# Patient Record
Sex: FEMALE | Race: WHITE | NOT HISPANIC OR LATINO
[De-identification: names, ages, dates, MRNs, and addresses within clinical notes are randomized per-mention and may not be internally consistent; named-entity substitution may affect disease eponyms.]

---

## 2019-06-13 PROBLEM — Z00.00 ENCOUNTER FOR PREVENTIVE HEALTH EXAMINATION: Status: ACTIVE | Noted: 2019-06-13

## 2019-06-14 ENCOUNTER — RX CHANGE (OUTPATIENT)
Age: 59
End: 2019-06-14

## 2019-07-25 ENCOUNTER — OUTPATIENT (OUTPATIENT)
Dept: OUTPATIENT SERVICES | Facility: HOSPITAL | Age: 59
LOS: 1 days | Discharge: HOME | End: 2019-07-25

## 2019-07-25 ENCOUNTER — LABORATORY RESULT (OUTPATIENT)
Age: 59
End: 2019-07-25

## 2019-07-25 DIAGNOSIS — R10.13 EPIGASTRIC PAIN: ICD-10-CM

## 2019-07-25 DIAGNOSIS — K22.70 BARRETT'S ESOPHAGUS WITHOUT DYSPLASIA: ICD-10-CM

## 2019-07-29 ENCOUNTER — TRANSCRIPTION ENCOUNTER (OUTPATIENT)
Age: 59
End: 2019-07-29

## 2019-07-29 ENCOUNTER — RESULT REVIEW (OUTPATIENT)
Age: 59
End: 2019-07-29

## 2019-07-29 ENCOUNTER — OUTPATIENT (OUTPATIENT)
Dept: OUTPATIENT SERVICES | Facility: HOSPITAL | Age: 59
LOS: 1 days | Discharge: HOME | End: 2019-07-29
Payer: COMMERCIAL

## 2019-07-29 ENCOUNTER — OTHER (OUTPATIENT)
Age: 59
End: 2019-07-29

## 2019-07-29 VITALS — SYSTOLIC BLOOD PRESSURE: 162 MMHG | RESPIRATION RATE: 18 BRPM | HEART RATE: 82 BPM | DIASTOLIC BLOOD PRESSURE: 87 MMHG

## 2019-07-29 VITALS
DIASTOLIC BLOOD PRESSURE: 83 MMHG | SYSTOLIC BLOOD PRESSURE: 149 MMHG | HEART RATE: 103 BPM | WEIGHT: 125 LBS | HEIGHT: 64 IN | OXYGEN SATURATION: 91 % | TEMPERATURE: 99 F | RESPIRATION RATE: 17 BRPM

## 2019-07-29 DIAGNOSIS — Z87.19 PERSONAL HISTORY OF OTHER DISEASES OF THE DIGESTIVE SYSTEM: ICD-10-CM

## 2019-07-29 DIAGNOSIS — Z98.890 OTHER SPECIFIED POSTPROCEDURAL STATES: Chronic | ICD-10-CM

## 2019-07-29 DIAGNOSIS — Z87.2 PERSONAL HISTORY OF DISEASES OF THE SKIN AND SUBCUTANEOUS TISSUE: ICD-10-CM

## 2019-07-29 DIAGNOSIS — F32.9 MAJOR DEPRESSIVE DISORDER, SINGLE EPISODE, UNSPECIFIED: Chronic | ICD-10-CM

## 2019-07-29 PROCEDURE — G0121 COLON CA SCRN NOT HI RSK IND: CPT

## 2019-07-29 PROCEDURE — 88312 SPECIAL STAINS GROUP 1: CPT | Mod: 26

## 2019-07-29 PROCEDURE — 88305 TISSUE EXAM BY PATHOLOGIST: CPT | Mod: 26

## 2019-07-29 PROCEDURE — 43239 EGD BIOPSY SINGLE/MULTIPLE: CPT | Mod: XS

## 2019-07-29 RX ORDER — SODIUM CHLORIDE 9 MG/ML
1000 INJECTION, SOLUTION INTRAVENOUS
Refills: 0 | Status: DISCONTINUED | OUTPATIENT
Start: 2019-07-29 | End: 2019-08-15

## 2019-07-29 RX ORDER — ONDANSETRON 8 MG/1
4 TABLET, FILM COATED ORAL ONCE
Refills: 0 | Status: DISCONTINUED | OUTPATIENT
Start: 2019-07-29 | End: 2019-08-15

## 2019-07-29 NOTE — H&P PST ADULT - NSICDXPASTMEDICALHX_GEN_ALL_CORE_FT
PAST MEDICAL HISTORY:  Depression, unspecified depression type     Eczema, unspecified type     Fibromyalgia

## 2019-07-29 NOTE — H&P PST ADULT - REASON FOR ADMISSION
57 yo f for egd and colonoscopy for anemia 57 yo f with history of Perez's esophagus  for egd and colonoscopy

## 2019-07-29 NOTE — H&P PST ADULT - HISTORY OF PRESENT ILLNESS
59 yo f for egd and colonoscopy for anemia 59 yo f with history of Perez's esophagitis  for egd and colonoscop

## 2019-07-29 NOTE — H&P PST ADULT - ASSESSMENT
59 yo f for egd and colonoscopy for anemia 57 yo f with a history of Perez's esophagitis for egd and colonoscopy

## 2019-07-29 NOTE — ASU DISCHARGE PLAN (ADULT/PEDIATRIC) - CALL YOUR DOCTOR IF YOU HAVE ANY OF THE FOLLOWING:
Unable to urinate/Bleeding that does not stop/Fever greater than (need to indicate Fahrenheit or Celsius)

## 2019-07-29 NOTE — ASU DISCHARGE PLAN (ADULT/PEDIATRIC) - CARE PROVIDER_API CALL
Octavio Lewis)  Gastroenterology; Internal Medicine  [address] [place] [address]  Phone: [phone]  Fax: [phone]  Follow Up Time: 1 month

## 2019-07-30 LAB — SURGICAL PATHOLOGY STUDY: SIGNIFICANT CHANGE UP

## 2019-08-01 DIAGNOSIS — F32.9 MAJOR DEPRESSIVE DISORDER, SINGLE EPISODE, UNSPECIFIED: ICD-10-CM

## 2019-08-01 DIAGNOSIS — K64.4 RESIDUAL HEMORRHOIDAL SKIN TAGS: ICD-10-CM

## 2019-08-01 DIAGNOSIS — Z12.11 ENCOUNTER FOR SCREENING FOR MALIGNANT NEOPLASM OF COLON: ICD-10-CM

## 2019-08-01 DIAGNOSIS — K29.50 UNSPECIFIED CHRONIC GASTRITIS WITHOUT BLEEDING: ICD-10-CM

## 2019-08-01 DIAGNOSIS — K64.1 SECOND DEGREE HEMORRHOIDS: ICD-10-CM

## 2019-08-01 DIAGNOSIS — M79.7 FIBROMYALGIA: ICD-10-CM

## 2019-08-09 PROBLEM — F32.9 MAJOR DEPRESSIVE DISORDER, SINGLE EPISODE, UNSPECIFIED: Chronic | Status: ACTIVE | Noted: 2019-07-29

## 2019-08-09 PROBLEM — M79.7 FIBROMYALGIA: Chronic | Status: ACTIVE | Noted: 2019-07-29

## 2019-08-09 PROBLEM — L30.9 DERMATITIS, UNSPECIFIED: Chronic | Status: ACTIVE | Noted: 2019-07-29

## 2019-10-29 ENCOUNTER — APPOINTMENT (OUTPATIENT)
Dept: GASTROENTEROLOGY | Facility: CLINIC | Age: 59
End: 2019-10-29

## 2020-06-09 ENCOUNTER — APPOINTMENT (OUTPATIENT)
Dept: GASTROENTEROLOGY | Facility: CLINIC | Age: 60
End: 2020-06-09
Payer: COMMERCIAL

## 2020-06-09 DIAGNOSIS — Z86.39 PERSONAL HISTORY OF OTHER ENDOCRINE, NUTRITIONAL AND METABOLIC DISEASE: ICD-10-CM

## 2020-06-09 DIAGNOSIS — Z87.39 PERSONAL HISTORY OF OTHER DISEASES OF THE MUSCULOSKELETAL SYSTEM AND CONNECTIVE TISSUE: ICD-10-CM

## 2020-06-09 DIAGNOSIS — Z12.11 ENCOUNTER FOR SCREENING FOR MALIGNANT NEOPLASM OF COLON: ICD-10-CM

## 2020-06-09 DIAGNOSIS — Z12.12 ENCOUNTER FOR SCREENING FOR MALIGNANT NEOPLASM OF COLON: ICD-10-CM

## 2020-06-09 PROCEDURE — 99213 OFFICE O/P EST LOW 20 MIN: CPT | Mod: 95

## 2020-06-09 RX ORDER — TIZANIDINE HYDROCHLORIDE 4 MG/1
TABLET ORAL
Refills: 0 | Status: ACTIVE | COMMUNITY

## 2020-06-09 RX ORDER — LEVOTHYROXINE SODIUM 50 UG/1
50 TABLET ORAL
Refills: 0 | Status: ACTIVE | COMMUNITY

## 2020-06-09 RX ORDER — ESCITALOPRAM OXALATE 10 MG/1
10 TABLET, FILM COATED ORAL
Refills: 0 | Status: ACTIVE | COMMUNITY

## 2020-06-09 RX ORDER — AMITRIPTYLINE HYDROCHLORIDE 25 MG/1
25 TABLET ORAL
Refills: 0 | Status: ACTIVE | COMMUNITY

## 2020-06-09 NOTE — HISTORY OF PRESENT ILLNESS
[Home] : at home, [unfilled] , at the time of the visit. [Medical Office: ([facility])___] : at the medical office located in  [Verbal consent obtained from patient] : the patient, [unfilled] [FreeTextEntry4] : Annette Pérez [de-identified] : This is a 59 year  old female who presents with Perez's esophagitis, followup of egd and colonoscopy.\par She is compliant with protonix 40 mg po bid.\par The patient denies nausea, vomiting , dysphagia, odynophagia, post prandial abdominal pain, or melena. The patient denies abdominal cramping, or black or bloody stool or wt loss.

## 2020-07-01 ENCOUNTER — TRANSCRIPTION ENCOUNTER (OUTPATIENT)
Age: 60
End: 2020-07-01

## 2020-12-08 ENCOUNTER — APPOINTMENT (OUTPATIENT)
Dept: GASTROENTEROLOGY | Facility: CLINIC | Age: 60
End: 2020-12-08

## 2021-06-09 ENCOUNTER — RX RENEWAL (OUTPATIENT)
Age: 61
End: 2021-06-09

## 2021-07-13 NOTE — PRE-ANESTHESIA EVALUATION ADULT - HEIGHT IN FEET
6
DM2 (diabetes mellitus, type 2)    GERD (gastroesophageal reflux disease)    Hyperlipemia    Hypertension    Hypothyroid    Lumbar spinal stenosis    Morbid obesity  BMI 50.6

## 2022-06-03 ENCOUNTER — RX RENEWAL (OUTPATIENT)
Age: 62
End: 2022-06-03

## 2023-05-29 ENCOUNTER — RX RENEWAL (OUTPATIENT)
Age: 63
End: 2023-05-29

## 2023-09-19 RX ADMIN — ETOMIDATE 15 MILLIGRAM(S): 2 INJECTION INTRAVENOUS at 22:40

## 2023-12-19 ENCOUNTER — INPATIENT (INPATIENT)
Facility: HOSPITAL | Age: 63
LOS: 9 days | Discharge: HOME | DRG: 871 | End: 2023-12-29
Attending: INTERNAL MEDICINE | Admitting: INTERNAL MEDICINE
Payer: COMMERCIAL

## 2023-12-19 VITALS
HEART RATE: 102 BPM | RESPIRATION RATE: 18 BRPM | DIASTOLIC BLOOD PRESSURE: 98 MMHG | OXYGEN SATURATION: 99 % | SYSTOLIC BLOOD PRESSURE: 208 MMHG

## 2023-12-19 DIAGNOSIS — A41.9 SEPSIS, UNSPECIFIED ORGANISM: ICD-10-CM

## 2023-12-19 DIAGNOSIS — Z98.890 OTHER SPECIFIED POSTPROCEDURAL STATES: Chronic | ICD-10-CM

## 2023-12-19 LAB
ALBUMIN SERPL ELPH-MCNC: 4 G/DL — SIGNIFICANT CHANGE UP (ref 3.5–5.2)
ALBUMIN SERPL ELPH-MCNC: 4 G/DL — SIGNIFICANT CHANGE UP (ref 3.5–5.2)
ALP SERPL-CCNC: 137 U/L — HIGH (ref 30–115)
ALP SERPL-CCNC: 137 U/L — HIGH (ref 30–115)
ALT FLD-CCNC: 75 U/L — HIGH (ref 0–41)
ALT FLD-CCNC: 75 U/L — HIGH (ref 0–41)
ANION GAP SERPL CALC-SCNC: 12 MMOL/L — SIGNIFICANT CHANGE UP (ref 7–14)
ANION GAP SERPL CALC-SCNC: 12 MMOL/L — SIGNIFICANT CHANGE UP (ref 7–14)
APTT BLD: 25.6 SEC — LOW (ref 27–39.2)
APTT BLD: 25.6 SEC — LOW (ref 27–39.2)
AST SERPL-CCNC: 34 U/L — SIGNIFICANT CHANGE UP (ref 0–41)
AST SERPL-CCNC: 34 U/L — SIGNIFICANT CHANGE UP (ref 0–41)
BASE EXCESS BLDV CALC-SCNC: 4.7 MMOL/L — HIGH (ref -2–3)
BASE EXCESS BLDV CALC-SCNC: 4.7 MMOL/L — HIGH (ref -2–3)
BASOPHILS # BLD AUTO: 0.02 K/UL — SIGNIFICANT CHANGE UP (ref 0–0.2)
BASOPHILS # BLD AUTO: 0.02 K/UL — SIGNIFICANT CHANGE UP (ref 0–0.2)
BASOPHILS NFR BLD AUTO: 0.2 % — SIGNIFICANT CHANGE UP (ref 0–1)
BASOPHILS NFR BLD AUTO: 0.2 % — SIGNIFICANT CHANGE UP (ref 0–1)
BILIRUB SERPL-MCNC: 0.2 MG/DL — SIGNIFICANT CHANGE UP (ref 0.2–1.2)
BILIRUB SERPL-MCNC: 0.2 MG/DL — SIGNIFICANT CHANGE UP (ref 0.2–1.2)
BUN SERPL-MCNC: 21 MG/DL — HIGH (ref 10–20)
BUN SERPL-MCNC: 21 MG/DL — HIGH (ref 10–20)
CALCIUM SERPL-MCNC: 8.9 MG/DL — SIGNIFICANT CHANGE UP (ref 8.4–10.5)
CALCIUM SERPL-MCNC: 8.9 MG/DL — SIGNIFICANT CHANGE UP (ref 8.4–10.5)
CHLORIDE SERPL-SCNC: 94 MMOL/L — LOW (ref 98–110)
CHLORIDE SERPL-SCNC: 94 MMOL/L — LOW (ref 98–110)
CO2 SERPL-SCNC: 27 MMOL/L — SIGNIFICANT CHANGE UP (ref 17–32)
CO2 SERPL-SCNC: 27 MMOL/L — SIGNIFICANT CHANGE UP (ref 17–32)
CREAT SERPL-MCNC: 1 MG/DL — SIGNIFICANT CHANGE UP (ref 0.7–1.5)
CREAT SERPL-MCNC: 1 MG/DL — SIGNIFICANT CHANGE UP (ref 0.7–1.5)
EGFR: 64 ML/MIN/1.73M2 — SIGNIFICANT CHANGE UP
EGFR: 64 ML/MIN/1.73M2 — SIGNIFICANT CHANGE UP
EOSINOPHIL # BLD AUTO: 0.03 K/UL — SIGNIFICANT CHANGE UP (ref 0–0.7)
EOSINOPHIL # BLD AUTO: 0.03 K/UL — SIGNIFICANT CHANGE UP (ref 0–0.7)
EOSINOPHIL NFR BLD AUTO: 0.3 % — SIGNIFICANT CHANGE UP (ref 0–8)
EOSINOPHIL NFR BLD AUTO: 0.3 % — SIGNIFICANT CHANGE UP (ref 0–8)
GAS PNL BLDV: 129 MMOL/L — LOW (ref 136–145)
GAS PNL BLDV: 129 MMOL/L — LOW (ref 136–145)
GAS PNL BLDV: SIGNIFICANT CHANGE UP
GAS PNL BLDV: SIGNIFICANT CHANGE UP
GLUCOSE SERPL-MCNC: 141 MG/DL — HIGH (ref 70–99)
GLUCOSE SERPL-MCNC: 141 MG/DL — HIGH (ref 70–99)
HCO3 BLDV-SCNC: 32 MMOL/L — HIGH (ref 22–29)
HCO3 BLDV-SCNC: 32 MMOL/L — HIGH (ref 22–29)
HCT VFR BLD CALC: 40.1 % — SIGNIFICANT CHANGE UP (ref 37–47)
HCT VFR BLD CALC: 40.1 % — SIGNIFICANT CHANGE UP (ref 37–47)
HGB BLD-MCNC: 12.9 G/DL — SIGNIFICANT CHANGE UP (ref 12–16)
HGB BLD-MCNC: 12.9 G/DL — SIGNIFICANT CHANGE UP (ref 12–16)
IMM GRANULOCYTES NFR BLD AUTO: 0.3 % — SIGNIFICANT CHANGE UP (ref 0.1–0.3)
IMM GRANULOCYTES NFR BLD AUTO: 0.3 % — SIGNIFICANT CHANGE UP (ref 0.1–0.3)
INR BLD: 0.97 RATIO — SIGNIFICANT CHANGE UP (ref 0.65–1.3)
INR BLD: 0.97 RATIO — SIGNIFICANT CHANGE UP (ref 0.65–1.3)
LACTATE BLDV-MCNC: 2.3 MMOL/L — HIGH (ref 0.5–2)
LACTATE BLDV-MCNC: 2.3 MMOL/L — HIGH (ref 0.5–2)
LYMPHOCYTES # BLD AUTO: 0.81 K/UL — LOW (ref 1.2–3.4)
LYMPHOCYTES # BLD AUTO: 0.81 K/UL — LOW (ref 1.2–3.4)
LYMPHOCYTES # BLD AUTO: 8.9 % — LOW (ref 20.5–51.1)
LYMPHOCYTES # BLD AUTO: 8.9 % — LOW (ref 20.5–51.1)
MCHC RBC-ENTMCNC: 26.5 PG — LOW (ref 27–31)
MCHC RBC-ENTMCNC: 26.5 PG — LOW (ref 27–31)
MCHC RBC-ENTMCNC: 32.2 G/DL — SIGNIFICANT CHANGE UP (ref 32–37)
MCHC RBC-ENTMCNC: 32.2 G/DL — SIGNIFICANT CHANGE UP (ref 32–37)
MCV RBC AUTO: 82.3 FL — SIGNIFICANT CHANGE UP (ref 81–99)
MCV RBC AUTO: 82.3 FL — SIGNIFICANT CHANGE UP (ref 81–99)
MONOCYTES # BLD AUTO: 0.59 K/UL — SIGNIFICANT CHANGE UP (ref 0.1–0.6)
MONOCYTES # BLD AUTO: 0.59 K/UL — SIGNIFICANT CHANGE UP (ref 0.1–0.6)
MONOCYTES NFR BLD AUTO: 6.5 % — SIGNIFICANT CHANGE UP (ref 1.7–9.3)
MONOCYTES NFR BLD AUTO: 6.5 % — SIGNIFICANT CHANGE UP (ref 1.7–9.3)
NEUTROPHILS # BLD AUTO: 7.63 K/UL — HIGH (ref 1.4–6.5)
NEUTROPHILS # BLD AUTO: 7.63 K/UL — HIGH (ref 1.4–6.5)
NEUTROPHILS NFR BLD AUTO: 83.8 % — HIGH (ref 42.2–75.2)
NEUTROPHILS NFR BLD AUTO: 83.8 % — HIGH (ref 42.2–75.2)
NRBC # BLD: 0 /100 WBCS — SIGNIFICANT CHANGE UP (ref 0–0)
NRBC # BLD: 0 /100 WBCS — SIGNIFICANT CHANGE UP (ref 0–0)
PCO2 BLDV: 58 MMHG — HIGH (ref 39–42)
PCO2 BLDV: 58 MMHG — HIGH (ref 39–42)
PH BLDV: 7.35 — SIGNIFICANT CHANGE UP (ref 7.32–7.43)
PH BLDV: 7.35 — SIGNIFICANT CHANGE UP (ref 7.32–7.43)
PLATELET # BLD AUTO: 200 K/UL — SIGNIFICANT CHANGE UP (ref 130–400)
PLATELET # BLD AUTO: 200 K/UL — SIGNIFICANT CHANGE UP (ref 130–400)
PMV BLD: 10.8 FL — HIGH (ref 7.4–10.4)
PMV BLD: 10.8 FL — HIGH (ref 7.4–10.4)
PO2 BLDV: 42 MMHG — SIGNIFICANT CHANGE UP (ref 25–45)
PO2 BLDV: 42 MMHG — SIGNIFICANT CHANGE UP (ref 25–45)
POTASSIUM BLDV-SCNC: 4.8 MMOL/L — SIGNIFICANT CHANGE UP (ref 3.5–5.1)
POTASSIUM BLDV-SCNC: 4.8 MMOL/L — SIGNIFICANT CHANGE UP (ref 3.5–5.1)
POTASSIUM SERPL-MCNC: 4.9 MMOL/L — SIGNIFICANT CHANGE UP (ref 3.5–5)
POTASSIUM SERPL-MCNC: 4.9 MMOL/L — SIGNIFICANT CHANGE UP (ref 3.5–5)
POTASSIUM SERPL-SCNC: 4.9 MMOL/L — SIGNIFICANT CHANGE UP (ref 3.5–5)
POTASSIUM SERPL-SCNC: 4.9 MMOL/L — SIGNIFICANT CHANGE UP (ref 3.5–5)
PROT SERPL-MCNC: 7.5 G/DL — SIGNIFICANT CHANGE UP (ref 6–8)
PROT SERPL-MCNC: 7.5 G/DL — SIGNIFICANT CHANGE UP (ref 6–8)
PROTHROM AB SERPL-ACNC: 11.1 SEC — SIGNIFICANT CHANGE UP (ref 9.95–12.87)
PROTHROM AB SERPL-ACNC: 11.1 SEC — SIGNIFICANT CHANGE UP (ref 9.95–12.87)
RBC # BLD: 4.87 M/UL — SIGNIFICANT CHANGE UP (ref 4.2–5.4)
RBC # BLD: 4.87 M/UL — SIGNIFICANT CHANGE UP (ref 4.2–5.4)
RBC # FLD: 14.3 % — SIGNIFICANT CHANGE UP (ref 11.5–14.5)
RBC # FLD: 14.3 % — SIGNIFICANT CHANGE UP (ref 11.5–14.5)
SAO2 % BLDV: 69.1 % — SIGNIFICANT CHANGE UP (ref 67–88)
SAO2 % BLDV: 69.1 % — SIGNIFICANT CHANGE UP (ref 67–88)
SODIUM SERPL-SCNC: 133 MMOL/L — LOW (ref 135–146)
SODIUM SERPL-SCNC: 133 MMOL/L — LOW (ref 135–146)
TROPONIN T, HIGH SENSITIVITY RESULT: 9 NG/L — SIGNIFICANT CHANGE UP (ref 6–13)
TROPONIN T, HIGH SENSITIVITY RESULT: 9 NG/L — SIGNIFICANT CHANGE UP (ref 6–13)
WBC # BLD: 9.11 K/UL — SIGNIFICANT CHANGE UP (ref 4.8–10.8)
WBC # BLD: 9.11 K/UL — SIGNIFICANT CHANGE UP (ref 4.8–10.8)
WBC # FLD AUTO: 9.11 K/UL — SIGNIFICANT CHANGE UP (ref 4.8–10.8)
WBC # FLD AUTO: 9.11 K/UL — SIGNIFICANT CHANGE UP (ref 4.8–10.8)

## 2023-12-19 PROCEDURE — 80354 DRUG SCREENING FENTANYL: CPT

## 2023-12-19 PROCEDURE — 71260 CT THORAX DX C+: CPT | Mod: 26,MA

## 2023-12-19 PROCEDURE — C1887: CPT

## 2023-12-19 PROCEDURE — C1760: CPT

## 2023-12-19 PROCEDURE — 95819 EEG AWAKE AND ASLEEP: CPT

## 2023-12-19 PROCEDURE — 36226 PLACE CATH VERTEBRAL ART: CPT | Mod: LT

## 2023-12-19 PROCEDURE — 93970 EXTREMITY STUDY: CPT

## 2023-12-19 PROCEDURE — 86901 BLOOD TYPING SEROLOGIC RH(D): CPT

## 2023-12-19 PROCEDURE — 94002 VENT MGMT INPAT INIT DAY: CPT

## 2023-12-19 PROCEDURE — 85730 THROMBOPLASTIN TIME PARTIAL: CPT

## 2023-12-19 PROCEDURE — 84443 ASSAY THYROID STIM HORMONE: CPT

## 2023-12-19 PROCEDURE — 80048 BASIC METABOLIC PNL TOTAL CA: CPT

## 2023-12-19 PROCEDURE — 87640 STAPH A DNA AMP PROBE: CPT

## 2023-12-19 PROCEDURE — 97162 PT EVAL MOD COMPLEX 30 MIN: CPT | Mod: GP

## 2023-12-19 PROCEDURE — 71045 X-RAY EXAM CHEST 1 VIEW: CPT

## 2023-12-19 PROCEDURE — 85610 PROTHROMBIN TIME: CPT

## 2023-12-19 PROCEDURE — 84145 PROCALCITONIN (PCT): CPT

## 2023-12-19 PROCEDURE — 85379 FIBRIN DEGRADATION QUANT: CPT

## 2023-12-19 PROCEDURE — 70551 MRI BRAIN STEM W/O DYE: CPT

## 2023-12-19 PROCEDURE — 86850 RBC ANTIBODY SCREEN: CPT

## 2023-12-19 PROCEDURE — 36224 PLACE CATH CAROTD ART: CPT | Mod: 50

## 2023-12-19 PROCEDURE — 85025 COMPLETE CBC W/AUTO DIFF WBC: CPT

## 2023-12-19 PROCEDURE — 97116 GAIT TRAINING THERAPY: CPT | Mod: GP

## 2023-12-19 PROCEDURE — 81001 URINALYSIS AUTO W/SCOPE: CPT

## 2023-12-19 PROCEDURE — 76937 US GUIDE VASCULAR ACCESS: CPT

## 2023-12-19 PROCEDURE — C1894: CPT

## 2023-12-19 PROCEDURE — 87899 AGENT NOS ASSAY W/OPTIC: CPT

## 2023-12-19 PROCEDURE — 97535 SELF CARE MNGMENT TRAINING: CPT | Mod: GO

## 2023-12-19 PROCEDURE — 80361 OPIATES 1 OR MORE: CPT

## 2023-12-19 PROCEDURE — 85014 HEMATOCRIT: CPT

## 2023-12-19 PROCEDURE — 83735 ASSAY OF MAGNESIUM: CPT

## 2023-12-19 PROCEDURE — 36415 COLL VENOUS BLD VENIPUNCTURE: CPT

## 2023-12-19 PROCEDURE — 87070 CULTURE OTHR SPECIMN AEROBIC: CPT

## 2023-12-19 PROCEDURE — 87641 MR-STAPH DNA AMP PROBE: CPT

## 2023-12-19 PROCEDURE — 36227 PLACE CATH XTRNL CAROTID: CPT | Mod: 50

## 2023-12-19 PROCEDURE — 85027 COMPLETE CBC AUTOMATED: CPT

## 2023-12-19 PROCEDURE — 97530 THERAPEUTIC ACTIVITIES: CPT | Mod: GP

## 2023-12-19 PROCEDURE — 31500 INSERT EMERGENCY AIRWAY: CPT

## 2023-12-19 PROCEDURE — 70450 CT HEAD/BRAIN W/O DYE: CPT | Mod: 26,MA

## 2023-12-19 PROCEDURE — 83036 HEMOGLOBIN GLYCOSYLATED A1C: CPT

## 2023-12-19 PROCEDURE — 70496 CT ANGIOGRAPHY HEAD: CPT | Mod: 26,MA

## 2023-12-19 PROCEDURE — 85018 HEMOGLOBIN: CPT

## 2023-12-19 PROCEDURE — 99285 EMERGENCY DEPT VISIT HI MDM: CPT | Mod: 25

## 2023-12-19 PROCEDURE — C1769: CPT

## 2023-12-19 PROCEDURE — 84295 ASSAY OF SERUM SODIUM: CPT

## 2023-12-19 PROCEDURE — 0241U: CPT

## 2023-12-19 PROCEDURE — 94640 AIRWAY INHALATION TREATMENT: CPT

## 2023-12-19 PROCEDURE — 83605 ASSAY OF LACTIC ACID: CPT

## 2023-12-19 PROCEDURE — 80307 DRUG TEST PRSMV CHEM ANLYZR: CPT

## 2023-12-19 PROCEDURE — 71045 X-RAY EXAM CHEST 1 VIEW: CPT | Mod: 26

## 2023-12-19 PROCEDURE — 82803 BLOOD GASES ANY COMBINATION: CPT

## 2023-12-19 PROCEDURE — 80053 COMPREHEN METABOLIC PANEL: CPT

## 2023-12-19 PROCEDURE — 82330 ASSAY OF CALCIUM: CPT

## 2023-12-19 PROCEDURE — 0042T: CPT | Mod: MA

## 2023-12-19 PROCEDURE — 82140 ASSAY OF AMMONIA: CPT

## 2023-12-19 PROCEDURE — 84100 ASSAY OF PHOSPHORUS: CPT

## 2023-12-19 PROCEDURE — 93307 TTE W/O DOPPLER COMPLETE: CPT

## 2023-12-19 PROCEDURE — 93005 ELECTROCARDIOGRAM TRACING: CPT

## 2023-12-19 PROCEDURE — 87449 NOS EACH ORGANISM AG IA: CPT

## 2023-12-19 PROCEDURE — 70498 CT ANGIOGRAPHY NECK: CPT | Mod: 26,MA

## 2023-12-19 PROCEDURE — 84132 ASSAY OF SERUM POTASSIUM: CPT

## 2023-12-19 PROCEDURE — 87086 URINE CULTURE/COLONY COUNT: CPT

## 2023-12-19 PROCEDURE — 97165 OT EVAL LOW COMPLEX 30 MIN: CPT | Mod: GO

## 2023-12-19 PROCEDURE — 86900 BLOOD TYPING SEROLOGIC ABO: CPT

## 2023-12-19 PROCEDURE — 86803 HEPATITIS C AB TEST: CPT

## 2023-12-19 RX ORDER — PROPOFOL 10 MG/ML
20 INJECTION, EMULSION INTRAVENOUS
Qty: 1000 | Refills: 0 | Status: DISCONTINUED | OUTPATIENT
Start: 2023-12-19 | End: 2023-12-20

## 2023-12-19 RX ORDER — SODIUM CHLORIDE 9 MG/ML
2000 INJECTION, SOLUTION INTRAVENOUS ONCE
Refills: 0 | Status: COMPLETED | OUTPATIENT
Start: 2023-12-19 | End: 2023-12-19

## 2023-12-19 RX ORDER — ETOMIDATE 2 MG/ML
15 INJECTION INTRAVENOUS ONCE
Refills: 0 | Status: COMPLETED | OUTPATIENT
Start: 2023-12-19 | End: 2023-09-19

## 2023-12-19 RX ORDER — ROCURONIUM BROMIDE 10 MG/ML
100 VIAL (ML) INTRAVENOUS ONCE
Refills: 0 | Status: COMPLETED | OUTPATIENT
Start: 2023-12-19 | End: 2023-12-19

## 2023-12-19 RX ORDER — CEFTRIAXONE 500 MG/1
1000 INJECTION, POWDER, FOR SOLUTION INTRAMUSCULAR; INTRAVENOUS ONCE
Refills: 0 | Status: COMPLETED | OUTPATIENT
Start: 2023-12-19 | End: 2023-12-19

## 2023-12-19 RX ORDER — METRONIDAZOLE 500 MG
500 TABLET ORAL ONCE
Refills: 0 | Status: COMPLETED | OUTPATIENT
Start: 2023-12-19 | End: 2023-12-19

## 2023-12-19 RX ORDER — AZITHROMYCIN 500 MG/1
500 TABLET, FILM COATED ORAL ONCE
Refills: 0 | Status: COMPLETED | OUTPATIENT
Start: 2023-12-19 | End: 2023-12-19

## 2023-12-19 RX ADMIN — PROPOFOL 7.8 MICROGRAM(S)/KG/MIN: 10 INJECTION, EMULSION INTRAVENOUS at 23:30

## 2023-12-19 RX ADMIN — Medication 100 MILLIGRAM(S): at 22:40

## 2023-12-19 NOTE — ED PROCEDURE NOTE - CPROC ED TRACHE INTUB DETAIL1
anterior airway, small mouth/Patient was pre-oxygenated. An endotracheal tube (ETT) was placed through the vocal cords into the trachea.  ETT position was confirmed by auscultation of bilateral breath sounds to all lung fields. ETCO2 level was appropriate./Difficult/crash intubation (see additional details section)./During intubation, applied gentle pressure to the cricoid cartilage.

## 2023-12-19 NOTE — H&P ADULT - NSHPPHYSICALEXAM_GEN_ALL_CORE
T(C): --  HR: 102 (12-19-23 @ 21:52) (102 - 102)  BP: 208/98 (12-19-23 @ 21:52) (208/98 - 208/98)  RR: 18 (12-19-23 @ 21:52) (18 - 18)  SpO2: 99% (12-19-23 @ 21:52) (99% - 99%)    CONSTITUTIONAL: Intubated and sedated   RESP: Mechanical resp sounds, bilateral ronchi. No wheezing  CV: RRR, +S1S2, no MRG; no JVD; no peripheral edema  GI: Soft, NT, ND, no rebound, no guarding

## 2023-12-19 NOTE — ED PROVIDER NOTE - NSICDXPASTSURGICALHX_GEN_ALL_CORE_FT
PAST SURGICAL HISTORY:  History of surgery RIGHT HIP REPLACEMENT    History of surgery BACK SURGERY

## 2023-12-19 NOTE — ED ADULT TRIAGE NOTE - CHIEF COMPLAINT QUOTE
Patient bibems from home as per EMS pts son called pt overdosed on one of her medications unsure which one. Pt transferred to [facility] Patient bibems from home as per EMS pts son called pt overdosed on one of her medications unsure which one. Pt transferred to [facility]

## 2023-12-19 NOTE — STROKE CODE NOTE - PROVIDER ASSESSMENT_DATE AND TIME
______________________________________________________________________________   

  

[id number] RAD/RAD Chest PA And Lateral  

EXAM:  RAD Chest PA And Lateral  

   

 INDICATION:  CONGESTIVE HEART FAILURE  

   

 COMPARISON:  None.  

   

 DISCUSSION:    

   

 Cardiomediastinal silhouette is enlarged.  

   

 No infiltrate, effusion, pneumothorax, or edema.  

   

 IMPRESSION:  

 Megaly without evidence of congestive heart failure exacerbation.  

   

 Electronically signed by Atul Orourke MD on 7/28/2019 1:11 PM  

   

  

Atul Orourke DO                 

 07/28/19 1314    

  

Thank you for allowing us to participate in the care of your patient. 19-Dec-2023 22:02

## 2023-12-19 NOTE — ED PROVIDER NOTE - CLINICAL SUMMARY MEDICAL DECISION MAKING FREE TEXT BOX
63-year-old female presents to the ED for altered mentation.  Initial concern for stroke versus overdose.  Patient was noted to be altered.  Tachypneic.  Concern for stroke versus sepsis.  Stroke code was called.  Patient was subsequently taken to CT and brought back.  Noted to be febrile.  CT imaging unremarkable for LVO or acute infarct.  Will treat for sepsis.  During the course of the patient's stay patient became persistently tachypneic and hypoxic to the low 90s.  Concern for airway compromise so decision was made to intubate.  Placed on a ventilator.  Independent interpretation by me of chest x-ray: Bilateral opacities noted on chest x-ray with ET tube in place.  Labs reviewed by me noted to have mild hyponatremia.  VBG with mildly elevated lactate.  Normal pH.  UA unremarkable.  CT angio of the brain with no evidence of LVO.  Left cerebellar developmental venous anomaly noted.  Not a tPA or thrombectomy candidate.  Started on broad-spectrum antibiotics and fluids.  Admitted to ICU.Patient was maintained on propofol drip.

## 2023-12-19 NOTE — H&P ADULT - NSHPLABSRESULTS_GEN_ALL_CORE
12.9   9.11  )-----------( 200      ( 19 Dec 2023 22:17 )             40.1     12-19    133<L>  |  94<L>  |  21<H>  ----------------------------<  141<H>  4.9   |  27  |  1.0    Ca    8.9      19 Dec 2023 22:17    TPro  7.5  /  Alb  4.0  /  TBili  0.2  /  DBili  x   /  AST  34  /  ALT  75<H>  /  AlkPhos  137<H>  12-19      < from: CT Chest w/ IV Cont (12.19.23 @ 22:45) >        IMPRESSION:    1.  Right upper lobe and left lower lobe patchy opacities. Correlate for   infectious etiologies.  2.  Multiple thyroid hypodensities which can be further evaluated with   nonemergent outpatient thyroid sonogram.    < end of copied text >

## 2023-12-19 NOTE — H&P ADULT - HISTORY OF PRESENT ILLNESS
62 yo F with PMHx of MDD and fibromyalgia BIBEMS to the hospital for AMS. Family called EMS because they found patient obtunded and altered at home today, they were concerned that patient may have overdosed on her sleeping pills. Family do describe that for the past 3 days patient has been having a cough, but unsure of rest of details. Further history unable to be assessed at this moment.     ED patient was hypertensive and hypoxemic. The decision to intubate her was done for protection of airways  Patient admitted to ICU for AHRF 2/2 bilateral pneumonia       Vital Signs Last 24 Hrs  T(C): --  T(F): --  HR: 102 (19 Dec 2023 21:52) (102 - 102)  BP: 208/98 (19 Dec 2023 21:52) (208/98 - 208/98)  BP(mean): --  RR: 18 (19 Dec 2023 21:52) (18 - 18)  SpO2: 99% (19 Dec 2023 21:52) (99% - 99%)    Parameters below as of 19 Dec 2023 21:52  Patient On (Oxygen Delivery Method): nasal cannula  O2 Flow (L/min): 3   64 yo F with PMHx of MDD and fibromyalgia BIBEMS to the hospital for AMS. Family called EMS because they found patient obtunded and altered at home today, they were concerned that patient may have overdosed on her sleeping pills. Family do describe that for the past 3 days patient has been having a cough, but unsure of rest of details. Further history unable to be assessed at this moment.     ED patient was hypertensive and hypoxemic. The decision to intubate her was done for protection of airways  Patient admitted to ICU for AHRF 2/2 bilateral pneumonia       Vital Signs Last 24 Hrs  T(C): --  T(F): --  HR: 102 (19 Dec 2023 21:52) (102 - 102)  BP: 208/98 (19 Dec 2023 21:52) (208/98 - 208/98)  BP(mean): --  RR: 18 (19 Dec 2023 21:52) (18 - 18)  SpO2: 99% (19 Dec 2023 21:52) (99% - 99%)    Parameters below as of 19 Dec 2023 21:52  Patient On (Oxygen Delivery Method): nasal cannula  O2 Flow (L/min): 3   64 yo F with PMHx of anxiety/MDD, Hypothyroidism and fibromyalgia BIBEMS to the hospital for AMS. Family called EMS because they found patient obtunded and altered at home today, they were concerned that patient may have overdosed on her sleeping pills. Family do describe that for the past 2 days patient has been having a cough, yesterday evening she started becoming progressively lethargic and thus he called EMS. Son denies any fever chills, seizure like activity fall or trauma while at home. Further history unable to be assessed at this moment.     Upon arrival a code stroke was called for AMS with unremarkable CTA head. In the ED patient was hypertensive and hypoxemic. The decision to intubate her was done for protection of airways. Patient admitted to ICU for AHRF 2/2 bilateral pneumonia       Vital Signs Last 24 Hrs  T(C): --  T(F): --  HR: 102 (19 Dec 2023 21:52) (102 - 102)  BP: 208/98 (19 Dec 2023 21:52) (208/98 - 208/98)  BP(mean): --  RR: 18 (19 Dec 2023 21:52) (18 - 18)  SpO2: 99% (19 Dec 2023 21:52) (99% - 99%)    Parameters below as of 19 Dec 2023 21:52  Patient On (Oxygen Delivery Method): nasal cannula  O2 Flow (L/min): 3   62 yo F with PMHx of anxiety/MDD, Hypothyroidism and fibromyalgia BIBEMS to the hospital for AMS. Family called EMS because they found patient obtunded and altered at home today, they were concerned that patient may have overdosed on her sleeping pills. Family do describe that for the past 2 days patient has been having a cough, yesterday evening she started becoming progressively lethargic and thus he called EMS. Son denies any fever chills, seizure like activity fall or trauma while at home. Further history unable to be assessed at this moment.     Upon arrival a code stroke was called for AMS with unremarkable CTA head. In the ED patient was hypertensive and hypoxemic. The decision to intubate her was done for protection of airways. Patient admitted to ICU for AHRF 2/2 bilateral pneumonia       Vital Signs Last 24 Hrs  T(C): --  T(F): --  HR: 102 (19 Dec 2023 21:52) (102 - 102)  BP: 208/98 (19 Dec 2023 21:52) (208/98 - 208/98)  BP(mean): --  RR: 18 (19 Dec 2023 21:52) (18 - 18)  SpO2: 99% (19 Dec 2023 21:52) (99% - 99%)    Parameters below as of 19 Dec 2023 21:52  Patient On (Oxygen Delivery Method): nasal cannula  O2 Flow (L/min): 3   64 yo F with PMHx of anxiety/MDD, Hypothyroidism and fibromyalgia BIBEMS to the hospital for AMS. Family called EMS because they found patient obtunded and altered at home today, they were concerned that patient may have overdosed on her sleeping pills. Family do describe that for the past 2 days patient has been having a cough, yesterday evening she started becoming progressively lethargic and thus he called EMS. Son denies any fever chills, seizure like activity fall or trauma while at home. Further history unable to be assessed at this moment.     Upon arrival a code stroke was called for AMS with CTA head neg for large vessel occlusion. In the ED patient was hypertensive and hypoxemic. The decision to intubate her was done for protection of airways. Patient admitted to ICU for AHRF 2/2 bilateral pneumonia       Vital Signs Last 24 Hrs  T(C): --  T(F): --  HR: 102 (19 Dec 2023 21:52) (102 - 102)  BP: 208/98 (19 Dec 2023 21:52) (208/98 - 208/98)  BP(mean): --  RR: 18 (19 Dec 2023 21:52) (18 - 18)  SpO2: 99% (19 Dec 2023 21:52) (99% - 99%)    Parameters below as of 19 Dec 2023 21:52  Patient On (Oxygen Delivery Method): nasal cannula  O2 Flow (L/min): 3

## 2023-12-19 NOTE — CONSULT NOTE ADULT - SUBJECTIVE AND OBJECTIVE BOX
CC: AMS        HPI: History as obtained by son via phone  64 yo female with pmhx of fibromyalgia, hypothyroidism, anxiety p.w AMS and difficulty breathing. As per son patient was having productive cough since 2 days and as of yesterday evening she started becoming progressively lethargic and thus he called EMS. On arrival to ed a stroke code for activate for AMS. Son denies any fever chills, seizure like activity fall or trauma while at home. /98      Social History  Family denies H/o smoking alcohol or illicit drug abuse.    Home Medications:  Morphine 60MG er 1 TAB Q 8 prn  Hydrocodone/acetaminophen 10/325 1 tab  prn  Lorazepam 1mg 1 tab daily prn for anxiety  Citalopram 10mg 1 tab daily  Amitriptyline 25mg 1 tab q hs  Pantoprazole 40 mg q 24  Levothyroxine 50mcg q daily        Neuro Exam:  Mental Status: Patient is very lethargic, does opens eyes spontaneously and to pain, not following commands,   Pupils 3mm reactive to light, eye midline at primary position, there is no obvious facial asymmetry, patient is currently not verbal  Minimal movement of UE/LE noted  No extremity rigidity noted on this exam  Responding to pain in all 4 extrs by withdrawing  Plantar responses mute currently      NIHSS: 21  1A: LOC 2  1B: LOC Questions 2  1C: Commands 2  2: Visual field 0  3: Gaze 0  4: Facial Palsy 0  5A: LUE Drift 3  5B: RUE Drift 3  6A: LLE Drift 3  6B: RLE Drift 3  7: Limb Ataxia (heel-shin, FNF) 0  8: Sensation 0  9: Language/Aphasia 3  10: Dysarthria 0  11: Extinction/Inattention 0    mRs  0 No symptoms at all  1 No significant disability despite symptoms; able to carry out all usual duties and activities without assistance  2 Slight disability; unable to carry out all previous activities, but able to look after own affairs  3 Moderate disability; requiring some help, but able to walk without assistance  4 Moderately severe disability; unable to walk without assistance and unable to attend to own bodily needs without assistance  5 Severe disability; bedridden, incontinent and requiring constant nursing care and attention  6 Dead    Allergies    No Known Allergies    Intolerances      MEDICATIONS  (STANDING):  Azithromycin  IVPB 500 milliGRAM(s) IV Intermittent once  CefTRIAXone   IVPB 1000 milliGRAM(s) IV Intermittent once  Etomidate Injectable 15 milliGRAM(s) IV Push Once  Lactated ringers Bolus 2000 milliLiter(s) IV Bolus once  MetroNIDAZOLE  IVPB 500 milliGRAM(s) IV Intermittent once  Propofol Infusion 20 MICROgram(s)/kG/Min (7.8 mL/Hr) IV Continuous <Continuous>  Rocuronium Injectable 100 milliGRAM(s) IV Push Once    MEDICATIONS  (PRN):      LABS:                        12.9   9.11  )-----------( 200      ( 19 Dec 2023 22:17 )             40.1     12-19    133<L>  |  94<L>  |  21<H>  ----------------------------<  141<H>  4.9   |  27  |  1.0    Ca    8.9      19 Dec 2023 22:17    TPro  7.5  /  Alb  4.0  /  TBili  0.2  /  DBili  x   /  AST  34  /  ALT  75<H>  /  AlkPhos  137<H>  12-19    PT/INR - ( 19 Dec 2023 22:17 )   PT: 11.10 sec;   INR: 0.97 ratio         PTT - ( 19 Dec 2023 22:17 )  PTT:25.6 sec        Neuro Imaging:  < from: CT Brain Stroke Protocol (12.19.23 @ 22:28) >  FINDINGS:    The ventricles and sulci are appropriatefor patient age.    There are no extra-axial fluid collections or hydrocephalus. There is no   evidence of acute territorial infarct, intracranial hemorrhage, mass   effect, or midline shift.    The calvarium is intact. The paranasal sinuses and mastoid complexes are   clear. The visualized orbits are unremarkable.      IMPRESSION:    No evidence of acute intracranial pathology.    Preliminary findings discussed with Justus Rodriguez on 12/19/2023 10:28 PM.    --- End of Report ---      SHIRLEY FRANKEL MD; Resident Radiologist  This document has been electronically signed.  ЮЛИЯ BILLS MD; Attending Radiologist  This document has been electronically signed. Dec 19 2023 10:58PM    < end of copied text >        < from: CT Angio Brain Stroke Protocol  w/ IV Cont (12.19.23 @ 22:44) >  IMPRESSION:    1.  Utilizing a CBF < 30% and Tmax > 6 seconds, no evidence of core   infarct or hypoperfusion.  2.  No large vessel occlusion or aneurysm.  3.  Left cerebellar developmental venous anomaly.  4.  Thyroid hypodensities can be further evaluated with nonemergent   outpatient thyroid sonogram.        ******PRELIMINARY REPORT******      ******PRELIMINARY REPORT******       SHIRLEY FRANKEL MD; Resident Radiologist  This document is a PRELIMINARY interpretation and is pending final   attending approval. Dec 19 2023 11:09PM    < end of copied text >    EEG:     Echo:   Carotid Doppler: N/A  Telemetry:

## 2023-12-19 NOTE — CONSULT NOTE ADULT - NS ATTEND AMEND GEN_ALL_CORE FT
Seen and examined the patient on 12/20. Alert and follows commands. Oriented to place and person. Some delays on answer. Agree w Brain MRI w/o. endovascular consult
English

## 2023-12-19 NOTE — ED PROVIDER NOTE - OBJECTIVE STATEMENT
63 years old female history of anxiety/depression, fibromyalgia B IBA from home secondary to altered mental status.  As per EMS, family was concerned about possible overdose on her anxiety medications.  Family also report patient has been coughing over the past few days so that is why she took extra medications so she can just "sleep it off".  As per  over the phone, patient has been coughing and sick over the past few days.  Patient was still doing okay until 4 PM.  Patient seems more lethargic and not breathing well so her son called EMS.   had taken extra 1 to 2 pills to sleep in the past but never got this bad like tonight.  Otherwise denies vomiting or diarrhea at home.

## 2023-12-19 NOTE — CONSULT NOTE ADULT - ASSESSMENT
64 yo female with pmhx of fibromyalgia, hypothyroidism, anxiety p.w AMS and difficulty breathing. As per son patient was having productive cough since 2 days and as of yesterday evening she started becoming progressively lethargic and thus he called EMS. On arrival to ed a stroke code for activate for AMS. Son denies any fever chills, seizure like activity fall or trauma while at home. /98  . NIHSS 21. CTH was negative for acute findings. Case was discussed with telestroke attending on call Dr Lakshmi Rausch. Patient is currently not a candidate for IV TNK since she was out of the therapeutic window and she currently has any focal neuro deficits to suggest stroke. CTA H/N negative for LVO or flow limiting stenosis, revealed developmental venous anomaly in the left cerebellum. There were no perfusion deficits. Lactate 2.3      #Altered mental status likely due to    DDX  -Seizure  -Posterior reversible encephalopathy syndrome (PRES)  -Toxic metabolic cause    Plan  ·	N/C MRI BRAIN  ·	Obtain ammonia level, TSH, HBAIC, UA, Urine cx, ESR, COVID  PCR  ·	REEG  ·	Neuroendovascular evaluation of the left cerebellar venous anomaly  ·	Medical management as per primary team  ·	Neuro attending note will follow     62 yo female with pmhx of fibromyalgia, hypothyroidism, anxiety p.w AMS and difficulty breathing. As per son patient was having productive cough since 2 days and as of yesterday evening she started becoming progressively lethargic and thus he called EMS. On arrival to ed a stroke code for activate for AMS. Son denies any fever chills, seizure like activity fall or trauma while at home. /98  . NIHSS 21. CTH was negative for acute findings. Case was discussed with telestroke attending on call Dr Lakshmi Rausch. Patient is currently not a candidate for IV TNK since she was out of the therapeutic window and she currently has any focal neuro deficits to suggest stroke. CTA H/N negative for LVO or flow limiting stenosis, revealed developmental venous anomaly in the left cerebellum. There were no perfusion deficits. Lactate 2.3, WBC 9, TEMP 102.1      #Altered mental status likely due to    DDX  -Seizure in the setting of  Sepsis  -Toxic metabolic cause    Plan  ·	Obtain ammonia level, TSH, HBAIC, UA, Urine cx, ESR, COVID  PCR,  ·	REEG  ·	Neuroendovascular evaluation of the left cerebellar venous anomaly  ·	Medical management as per primary team  ·	Neuro attending note will follow     64 yo female with pmhx of fibromyalgia, hypothyroidism, anxiety p.w AMS and difficulty breathing. As per son patient was having productive cough since 2 days and as of yesterday evening she started becoming progressively lethargic and thus he called EMS. On arrival to ed a stroke code for activate for AMS. Son denies any fever chills, seizure like activity fall or trauma while at home. /98  . NIHSS 21. CTH was negative for acute findings. Case was discussed with telestroke attending on call Dr Lakshmi Rausch. Patient is currently not a candidate for IV TNK since she was out of the therapeutic window and she currently has any focal neuro deficits to suggest stroke. CTA H/N negative for LVO or flow limiting stenosis, revealed developmental venous anomaly in the left cerebellum. There were no perfusion deficits. Lactate 2.3, WBC 9, TEMP 102.1      #Altered mental status likely due to    DDX  -Seizure in the setting of  Sepsis  -Toxic metabolic cause    Plan  ·	Obtain ammonia level, TSH, HBAIC, UA, Urine cx, ESR, COVID  PCR,  ·	REEG  ·	Neuroendovascular evaluation of the left cerebellar venous anomaly  ·	Medical management as per primary team  ·	Neuro attending note will follow     62 yo female with pmhx of fibromyalgia, hypothyroidism, anxiety p.w AMS and difficulty breathing. As per son patient was having productive cough since 2 days and as of yesterday evening she started becoming progressively lethargic and thus he called EMS. On arrival to ed a stroke code for activate for AMS. Son denies any fever chills, seizure like activity fall or trauma while at home. /98  . NIHSS 21. CTH was negative for acute findings. Case was discussed with telestroke attending on call Dr Lakshmi Rausch. Patient is currently not a candidate for IV TNK since she was out of the therapeutic window and she currently has any focal neuro deficits to suggest stroke. CTA H/N negative for LVO or flow limiting stenosis, revealed developmental venous anomaly in the left cerebellum. There were no perfusion deficits. Lactate 2.3, WBC 9, TEMP 102.1      #Altered mental status likely due to    DDX  -Seizure in the setting of  Sepsis  -Toxic metabolic cause    Plan  ·	Obtain ammonia level, TSH, HBAIC, UA, Urine cx, ESR, COVID  PCR,  ·	REEG  ·	Neuroendovascular evaluation of the left cerebellar venous anomaly  ·	Brain MRI w/o   ·	Medical management as per primary team  ·	Neuro attending note will follow

## 2023-12-19 NOTE — ED PROVIDER NOTE - PHYSICAL EXAMINATION
CONSTITUTIONAL: Chronically ill poorly responsive female  EYES: PERRL; EOM intact.  Pupils 2 mm bilaterally  CARDIOVASCULAR: Normal S1, S2; no murmurs, rubs, or gallops.   RESPIRATORY: Respiratory rate 24-30.  Shallow breathing.  Bilateral rhonchi.  No accessory muscle use.    GI/: Normal bowel sounds; non-distended; non-tender; no palpable organomegaly.   MS: No calf swelling and tenderness. No pitting edema to lower extremities  SKIN: No skin rash  NEURO/PSYCH: Alert, responds to painful and verbal stimuli but poorly responsive.  Move all extremities.  Rigid muscle tone.

## 2023-12-19 NOTE — ED PROVIDER NOTE - DATE/TIME 2
19-Dec-2023 22:50 Composite Graft Text: The defect edges were debeveled with a #15 scalpel blade.  Given the location of the defect, shape of the defect, the proximity to free margins and the fact the defect was full thickness a composite graft was deemed most appropriate.  The defect was outline and then transferred to the donor site.  A full thickness graft was then excised from the donor site. The graft was then placed in the primary defect, oriented appropriately and then sutured into place.  The secondary defect was then repaired using a primary closure.

## 2023-12-19 NOTE — H&P ADULT - ASSESSMENT
IMPRESSION:    Acute Hypoxemic Respiratory Failure SP Intubation 12/19  Bilateral Pneumonia   HO Fribromyalgia   HO Hypothyroidism   HO Anxiety     PLAN    CNS: Sedate with Propofol. Target RASS -1 to -2 Daily SAT. CT head neg     HEENT: Oral care. Aspiration precautions.     PULMONARY: HOB @ 45. ABG. Will update vent settings. Repeat ABG in one hour. Albuterol MDI q 4.    CARDIOVASCULAR: s/p IVF bolus. MAP and HR is adequate. Keep equal balance. TTE. ECG     GI: GI prophylaxis.     RENAL: Replete electrolytes as needed.     INFECTIOUS DISEASE: Cover empirically for CAP and get procalcitonin, follow blood cultures, Nasal MRSA. DTA. Full set of rapid viral panel.    HEMATOLOGICAL:  DVT ppx. CT bilateral opacities ine MACK and RLL    ENDOCRINE: Keep -180. Thyroid U/S     MUSCULOSKELETAL: Off loading.       ICU IMPRESSION:  Acute Hypoxemic Respiratory Failure SP Intubation 12/19  Bilateral Pneumonia   Left Cerebellar venous Anomaly   HO Fribromyalgia   HO Hypothyroidism   HO Anxiety     PLAN    CNS: Sedate with Propofol. Target RASS -1 to -2 Daily SAT. CT head: left cerebellar venous anomaly, no large vessel occlusion. Neuroendovascular consult. Ammonia, Hba1c, cultures. rEEG for now. Neuro on board     HEENT: Oral care. Aspiration precautions.     PULMONARY: HOB @ 45. ABG. Will update vent settings. Repeat ABG in one hour. Albuterol MDI q 4.    CARDIOVASCULAR: s/p IVF bolus. MAP and HR is adequate. Keep equal balance. TTE. ECG     GI: GI prophylaxis.     RENAL: Replete electrolytes as needed.     INFECTIOUS DISEASE: Cover empirically for CAP and get procalcitonin, follow blood cultures, Nasal MRSA. DTA. Full set of rapid viral panel.    HEMATOLOGICAL:  DVT ppx. CT bilateral opacities ine MACK and RLL    ENDOCRINE: Keep -180. Thyroid U/S     MUSCULOSKELETAL: Off loading.       ICU

## 2023-12-19 NOTE — ED PROVIDER NOTE - PROGRESS NOTE DETAILS
After discussion with patient's , patient has acute mental status changes and last known normal at 4 to 5 PM.  Concern for CVA.  Stroke code activated.  Patient is found to be febrile afterward.  More likely metabolic encephalopathy from sepsis.  Stroke team evaluate patient and discussed with telestroke neurologist, patient is not a TNK candidate.  Workup for metabolic encephalopathy. Patient is poorly responsive and started gurgling.  Patient is aspiration risk and intubate patient to protect airway.

## 2023-12-19 NOTE — STROKE CODE NOTE - IV ALTEPLASE EXCLUSION ABS OTHER
Patient out of the window for Thrombolysis and currently Febrile and does not have any focal deficits to suggest CVA

## 2023-12-20 DIAGNOSIS — J96.01 ACUTE RESPIRATORY FAILURE WITH HYPOXIA: ICD-10-CM

## 2023-12-20 DIAGNOSIS — R41.82 ALTERED MENTAL STATUS, UNSPECIFIED: ICD-10-CM

## 2023-12-20 DIAGNOSIS — Z51.5 ENCOUNTER FOR PALLIATIVE CARE: ICD-10-CM

## 2023-12-20 LAB
ALBUMIN SERPL ELPH-MCNC: 3.1 G/DL — LOW (ref 3.5–5.2)
ALBUMIN SERPL ELPH-MCNC: 3.1 G/DL — LOW (ref 3.5–5.2)
ALBUMIN SERPL ELPH-MCNC: 3.5 G/DL — SIGNIFICANT CHANGE UP (ref 3.5–5.2)
ALBUMIN SERPL ELPH-MCNC: 3.5 G/DL — SIGNIFICANT CHANGE UP (ref 3.5–5.2)
ALP SERPL-CCNC: 105 U/L — SIGNIFICANT CHANGE UP (ref 30–115)
ALP SERPL-CCNC: 105 U/L — SIGNIFICANT CHANGE UP (ref 30–115)
ALP SERPL-CCNC: 109 U/L — SIGNIFICANT CHANGE UP (ref 30–115)
ALP SERPL-CCNC: 109 U/L — SIGNIFICANT CHANGE UP (ref 30–115)
ALT FLD-CCNC: 54 U/L — HIGH (ref 0–41)
ALT FLD-CCNC: 54 U/L — HIGH (ref 0–41)
ALT FLD-CCNC: 55 U/L — HIGH (ref 0–41)
ALT FLD-CCNC: 55 U/L — HIGH (ref 0–41)
AMPHET UR-MCNC: NEGATIVE — SIGNIFICANT CHANGE UP
AMPHET UR-MCNC: NEGATIVE — SIGNIFICANT CHANGE UP
ANION GAP SERPL CALC-SCNC: 12 MMOL/L — SIGNIFICANT CHANGE UP (ref 7–14)
ANION GAP SERPL CALC-SCNC: 12 MMOL/L — SIGNIFICANT CHANGE UP (ref 7–14)
ANION GAP SERPL CALC-SCNC: 14 MMOL/L — SIGNIFICANT CHANGE UP (ref 7–14)
ANION GAP SERPL CALC-SCNC: 14 MMOL/L — SIGNIFICANT CHANGE UP (ref 7–14)
APAP SERPL-MCNC: <5 UG/ML — LOW (ref 10–30)
APAP SERPL-MCNC: <5 UG/ML — LOW (ref 10–30)
APPEARANCE UR: CLEAR — SIGNIFICANT CHANGE UP
APPEARANCE UR: CLEAR — SIGNIFICANT CHANGE UP
AST SERPL-CCNC: 29 U/L — SIGNIFICANT CHANGE UP (ref 0–41)
AST SERPL-CCNC: 29 U/L — SIGNIFICANT CHANGE UP (ref 0–41)
AST SERPL-CCNC: 36 U/L — SIGNIFICANT CHANGE UP (ref 0–41)
AST SERPL-CCNC: 36 U/L — SIGNIFICANT CHANGE UP (ref 0–41)
BACTERIA # UR AUTO: NEGATIVE /HPF — SIGNIFICANT CHANGE UP
BACTERIA # UR AUTO: NEGATIVE /HPF — SIGNIFICANT CHANGE UP
BARBITURATES UR SCN-MCNC: NEGATIVE — SIGNIFICANT CHANGE UP
BARBITURATES UR SCN-MCNC: NEGATIVE — SIGNIFICANT CHANGE UP
BASOPHILS # BLD AUTO: 0.01 K/UL — SIGNIFICANT CHANGE UP (ref 0–0.2)
BASOPHILS # BLD AUTO: 0.01 K/UL — SIGNIFICANT CHANGE UP (ref 0–0.2)
BASOPHILS # BLD AUTO: 0.03 K/UL — SIGNIFICANT CHANGE UP (ref 0–0.2)
BASOPHILS # BLD AUTO: 0.03 K/UL — SIGNIFICANT CHANGE UP (ref 0–0.2)
BASOPHILS NFR BLD AUTO: 0.1 % — SIGNIFICANT CHANGE UP (ref 0–1)
BASOPHILS NFR BLD AUTO: 0.1 % — SIGNIFICANT CHANGE UP (ref 0–1)
BASOPHILS NFR BLD AUTO: 0.2 % — SIGNIFICANT CHANGE UP (ref 0–1)
BASOPHILS NFR BLD AUTO: 0.2 % — SIGNIFICANT CHANGE UP (ref 0–1)
BENZODIAZ UR-MCNC: NEGATIVE — SIGNIFICANT CHANGE UP
BENZODIAZ UR-MCNC: NEGATIVE — SIGNIFICANT CHANGE UP
BILIRUB SERPL-MCNC: 0.4 MG/DL — SIGNIFICANT CHANGE UP (ref 0.2–1.2)
BILIRUB SERPL-MCNC: 0.4 MG/DL — SIGNIFICANT CHANGE UP (ref 0.2–1.2)
BILIRUB SERPL-MCNC: <0.2 MG/DL — SIGNIFICANT CHANGE UP (ref 0.2–1.2)
BILIRUB SERPL-MCNC: <0.2 MG/DL — SIGNIFICANT CHANGE UP (ref 0.2–1.2)
BILIRUB UR-MCNC: NEGATIVE — SIGNIFICANT CHANGE UP
BILIRUB UR-MCNC: NEGATIVE — SIGNIFICANT CHANGE UP
BUN SERPL-MCNC: 13 MG/DL — SIGNIFICANT CHANGE UP (ref 10–20)
BUN SERPL-MCNC: 13 MG/DL — SIGNIFICANT CHANGE UP (ref 10–20)
BUN SERPL-MCNC: 15 MG/DL — SIGNIFICANT CHANGE UP (ref 10–20)
BUN SERPL-MCNC: 15 MG/DL — SIGNIFICANT CHANGE UP (ref 10–20)
CALCIUM SERPL-MCNC: 7.7 MG/DL — LOW (ref 8.4–10.5)
CALCIUM SERPL-MCNC: 7.7 MG/DL — LOW (ref 8.4–10.5)
CALCIUM SERPL-MCNC: 8.3 MG/DL — LOW (ref 8.4–10.4)
CALCIUM SERPL-MCNC: 8.3 MG/DL — LOW (ref 8.4–10.4)
CAST: 2 /LPF — SIGNIFICANT CHANGE UP (ref 0–4)
CAST: 2 /LPF — SIGNIFICANT CHANGE UP (ref 0–4)
CHLORIDE SERPL-SCNC: 95 MMOL/L — LOW (ref 98–110)
CHLORIDE SERPL-SCNC: 95 MMOL/L — LOW (ref 98–110)
CHLORIDE SERPL-SCNC: 97 MMOL/L — LOW (ref 98–110)
CHLORIDE SERPL-SCNC: 97 MMOL/L — LOW (ref 98–110)
CO2 SERPL-SCNC: 24 MMOL/L — SIGNIFICANT CHANGE UP (ref 17–32)
COCAINE METAB.OTHER UR-MCNC: NEGATIVE — SIGNIFICANT CHANGE UP
COCAINE METAB.OTHER UR-MCNC: NEGATIVE — SIGNIFICANT CHANGE UP
COLOR SPEC: YELLOW — SIGNIFICANT CHANGE UP
COLOR SPEC: YELLOW — SIGNIFICANT CHANGE UP
CREAT SERPL-MCNC: 1 MG/DL — SIGNIFICANT CHANGE UP (ref 0.7–1.5)
CREAT SERPL-MCNC: 1 MG/DL — SIGNIFICANT CHANGE UP (ref 0.7–1.5)
CREAT SERPL-MCNC: 1.1 MG/DL — SIGNIFICANT CHANGE UP (ref 0.7–1.5)
CREAT SERPL-MCNC: 1.1 MG/DL — SIGNIFICANT CHANGE UP (ref 0.7–1.5)
D DIMER BLD IA.RAPID-MCNC: 2052 NG/ML DDU — HIGH
D DIMER BLD IA.RAPID-MCNC: 2052 NG/ML DDU — HIGH
DIFF PNL FLD: NEGATIVE — SIGNIFICANT CHANGE UP
DIFF PNL FLD: NEGATIVE — SIGNIFICANT CHANGE UP
DRUG SCREEN 1, URINE RESULT: SIGNIFICANT CHANGE UP
DRUG SCREEN 1, URINE RESULT: SIGNIFICANT CHANGE UP
EGFR: 57 ML/MIN/1.73M2 — LOW
EGFR: 57 ML/MIN/1.73M2 — LOW
EGFR: 64 ML/MIN/1.73M2 — SIGNIFICANT CHANGE UP
EGFR: 64 ML/MIN/1.73M2 — SIGNIFICANT CHANGE UP
EOSINOPHIL # BLD AUTO: 0.01 K/UL — SIGNIFICANT CHANGE UP (ref 0–0.7)
EOSINOPHIL # BLD AUTO: 0.01 K/UL — SIGNIFICANT CHANGE UP (ref 0–0.7)
EOSINOPHIL # BLD AUTO: 0.17 K/UL — SIGNIFICANT CHANGE UP (ref 0–0.7)
EOSINOPHIL # BLD AUTO: 0.17 K/UL — SIGNIFICANT CHANGE UP (ref 0–0.7)
EOSINOPHIL NFR BLD AUTO: 0.1 % — SIGNIFICANT CHANGE UP (ref 0–8)
EOSINOPHIL NFR BLD AUTO: 0.1 % — SIGNIFICANT CHANGE UP (ref 0–8)
EOSINOPHIL NFR BLD AUTO: 0.9 % — SIGNIFICANT CHANGE UP (ref 0–8)
EOSINOPHIL NFR BLD AUTO: 0.9 % — SIGNIFICANT CHANGE UP (ref 0–8)
ETHANOL SERPL-MCNC: <10 MG/DL — SIGNIFICANT CHANGE UP
ETHANOL SERPL-MCNC: <10 MG/DL — SIGNIFICANT CHANGE UP
FENTANYL UR QL: NEGATIVE — SIGNIFICANT CHANGE UP
FENTANYL UR QL: NEGATIVE — SIGNIFICANT CHANGE UP
FLUAV AG NPH QL: DETECTED
FLUAV AG NPH QL: DETECTED
FLUBV AG NPH QL: SIGNIFICANT CHANGE UP
FLUBV AG NPH QL: SIGNIFICANT CHANGE UP
GAS PNL BLDA: SIGNIFICANT CHANGE UP
GLUCOSE SERPL-MCNC: 121 MG/DL — HIGH (ref 70–99)
GLUCOSE SERPL-MCNC: 121 MG/DL — HIGH (ref 70–99)
GLUCOSE SERPL-MCNC: 132 MG/DL — HIGH (ref 70–99)
GLUCOSE SERPL-MCNC: 132 MG/DL — HIGH (ref 70–99)
GLUCOSE UR QL: NEGATIVE MG/DL — SIGNIFICANT CHANGE UP
GLUCOSE UR QL: NEGATIVE MG/DL — SIGNIFICANT CHANGE UP
HCT VFR BLD CALC: 33.5 % — LOW (ref 37–47)
HCT VFR BLD CALC: 33.5 % — LOW (ref 37–47)
HCT VFR BLD CALC: 35.7 % — LOW (ref 37–47)
HCT VFR BLD CALC: 35.7 % — LOW (ref 37–47)
HCV AB S/CO SERPL IA: 0.04 COI — SIGNIFICANT CHANGE UP
HCV AB S/CO SERPL IA: 0.04 COI — SIGNIFICANT CHANGE UP
HCV AB SERPL-IMP: SIGNIFICANT CHANGE UP
HCV AB SERPL-IMP: SIGNIFICANT CHANGE UP
HGB BLD-MCNC: 10.9 G/DL — LOW (ref 12–16)
HGB BLD-MCNC: 10.9 G/DL — LOW (ref 12–16)
HGB BLD-MCNC: 11.5 G/DL — LOW (ref 12–16)
HGB BLD-MCNC: 11.5 G/DL — LOW (ref 12–16)
IMM GRANULOCYTES NFR BLD AUTO: 0.5 % — HIGH (ref 0.1–0.3)
IMM GRANULOCYTES NFR BLD AUTO: 0.5 % — HIGH (ref 0.1–0.3)
IMM GRANULOCYTES NFR BLD AUTO: 1.4 % — HIGH (ref 0.1–0.3)
IMM GRANULOCYTES NFR BLD AUTO: 1.4 % — HIGH (ref 0.1–0.3)
KETONES UR-MCNC: NEGATIVE MG/DL — SIGNIFICANT CHANGE UP
KETONES UR-MCNC: NEGATIVE MG/DL — SIGNIFICANT CHANGE UP
LACTATE SERPL-SCNC: 2.5 MMOL/L — HIGH (ref 0.7–2)
LEUKOCYTE ESTERASE UR-ACNC: NEGATIVE — SIGNIFICANT CHANGE UP
LEUKOCYTE ESTERASE UR-ACNC: NEGATIVE — SIGNIFICANT CHANGE UP
LYMPHOCYTES # BLD AUTO: 0.83 K/UL — LOW (ref 1.2–3.4)
LYMPHOCYTES # BLD AUTO: 0.83 K/UL — LOW (ref 1.2–3.4)
LYMPHOCYTES # BLD AUTO: 1.02 K/UL — LOW (ref 1.2–3.4)
LYMPHOCYTES # BLD AUTO: 1.02 K/UL — LOW (ref 1.2–3.4)
LYMPHOCYTES # BLD AUTO: 5.6 % — LOW (ref 20.5–51.1)
LYMPHOCYTES # BLD AUTO: 5.6 % — LOW (ref 20.5–51.1)
LYMPHOCYTES # BLD AUTO: 7.9 % — LOW (ref 20.5–51.1)
LYMPHOCYTES # BLD AUTO: 7.9 % — LOW (ref 20.5–51.1)
MAGNESIUM SERPL-MCNC: 1.4 MG/DL — LOW (ref 1.8–2.4)
MAGNESIUM SERPL-MCNC: 1.4 MG/DL — LOW (ref 1.8–2.4)
MAGNESIUM SERPL-MCNC: 3.8 MG/DL — CRITICAL HIGH (ref 1.8–2.4)
MAGNESIUM SERPL-MCNC: 3.8 MG/DL — CRITICAL HIGH (ref 1.8–2.4)
MCHC RBC-ENTMCNC: 26.5 PG — LOW (ref 27–31)
MCHC RBC-ENTMCNC: 26.5 PG — LOW (ref 27–31)
MCHC RBC-ENTMCNC: 26.7 PG — LOW (ref 27–31)
MCHC RBC-ENTMCNC: 26.7 PG — LOW (ref 27–31)
MCHC RBC-ENTMCNC: 32.2 G/DL — SIGNIFICANT CHANGE UP (ref 32–37)
MCHC RBC-ENTMCNC: 32.2 G/DL — SIGNIFICANT CHANGE UP (ref 32–37)
MCHC RBC-ENTMCNC: 32.5 G/DL — SIGNIFICANT CHANGE UP (ref 32–37)
MCHC RBC-ENTMCNC: 32.5 G/DL — SIGNIFICANT CHANGE UP (ref 32–37)
MCV RBC AUTO: 81.5 FL — SIGNIFICANT CHANGE UP (ref 81–99)
MCV RBC AUTO: 81.5 FL — SIGNIFICANT CHANGE UP (ref 81–99)
MCV RBC AUTO: 82.8 FL — SIGNIFICANT CHANGE UP (ref 81–99)
MCV RBC AUTO: 82.8 FL — SIGNIFICANT CHANGE UP (ref 81–99)
METHADONE UR-MCNC: NEGATIVE — SIGNIFICANT CHANGE UP
METHADONE UR-MCNC: NEGATIVE — SIGNIFICANT CHANGE UP
MONOCYTES # BLD AUTO: 0.39 K/UL — SIGNIFICANT CHANGE UP (ref 0.1–0.6)
MONOCYTES # BLD AUTO: 0.39 K/UL — SIGNIFICANT CHANGE UP (ref 0.1–0.6)
MONOCYTES # BLD AUTO: 0.77 K/UL — HIGH (ref 0.1–0.6)
MONOCYTES # BLD AUTO: 0.77 K/UL — HIGH (ref 0.1–0.6)
MONOCYTES NFR BLD AUTO: 3.7 % — SIGNIFICANT CHANGE UP (ref 1.7–9.3)
MONOCYTES NFR BLD AUTO: 3.7 % — SIGNIFICANT CHANGE UP (ref 1.7–9.3)
MONOCYTES NFR BLD AUTO: 4.2 % — SIGNIFICANT CHANGE UP (ref 1.7–9.3)
MONOCYTES NFR BLD AUTO: 4.2 % — SIGNIFICANT CHANGE UP (ref 1.7–9.3)
MRSA PCR RESULT.: NEGATIVE — SIGNIFICANT CHANGE UP
MRSA PCR RESULT.: NEGATIVE — SIGNIFICANT CHANGE UP
NEUTROPHILS # BLD AUTO: 16.02 K/UL — HIGH (ref 1.4–6.5)
NEUTROPHILS # BLD AUTO: 16.02 K/UL — HIGH (ref 1.4–6.5)
NEUTROPHILS # BLD AUTO: 9.21 K/UL — HIGH (ref 1.4–6.5)
NEUTROPHILS # BLD AUTO: 9.21 K/UL — HIGH (ref 1.4–6.5)
NEUTROPHILS NFR BLD AUTO: 87.7 % — HIGH (ref 42.2–75.2)
NITRITE UR-MCNC: NEGATIVE — SIGNIFICANT CHANGE UP
NITRITE UR-MCNC: NEGATIVE — SIGNIFICANT CHANGE UP
NRBC # BLD: 0 /100 WBCS — SIGNIFICANT CHANGE UP (ref 0–0)
OPIATES UR-MCNC: POSITIVE
OPIATES UR-MCNC: POSITIVE
OXYCODONE UR-MCNC: NEGATIVE — SIGNIFICANT CHANGE UP
OXYCODONE UR-MCNC: NEGATIVE — SIGNIFICANT CHANGE UP
PCP UR-MCNC: NEGATIVE — SIGNIFICANT CHANGE UP
PCP UR-MCNC: NEGATIVE — SIGNIFICANT CHANGE UP
PH UR: 6.5 — SIGNIFICANT CHANGE UP (ref 5–8)
PH UR: 6.5 — SIGNIFICANT CHANGE UP (ref 5–8)
PHOSPHATE SERPL-MCNC: 1.7 MG/DL — LOW (ref 2.1–4.9)
PHOSPHATE SERPL-MCNC: 1.7 MG/DL — LOW (ref 2.1–4.9)
PHOSPHATE SERPL-MCNC: 4.7 MG/DL — SIGNIFICANT CHANGE UP (ref 2.1–4.9)
PHOSPHATE SERPL-MCNC: 4.7 MG/DL — SIGNIFICANT CHANGE UP (ref 2.1–4.9)
PLATELET # BLD AUTO: 171 K/UL — SIGNIFICANT CHANGE UP (ref 130–400)
PLATELET # BLD AUTO: 171 K/UL — SIGNIFICANT CHANGE UP (ref 130–400)
PLATELET # BLD AUTO: 172 K/UL — SIGNIFICANT CHANGE UP (ref 130–400)
PLATELET # BLD AUTO: 172 K/UL — SIGNIFICANT CHANGE UP (ref 130–400)
PMV BLD: 11.3 FL — HIGH (ref 7.4–10.4)
PMV BLD: 11.3 FL — HIGH (ref 7.4–10.4)
PMV BLD: 11.5 FL — HIGH (ref 7.4–10.4)
PMV BLD: 11.5 FL — HIGH (ref 7.4–10.4)
POTASSIUM SERPL-MCNC: 3.8 MMOL/L — SIGNIFICANT CHANGE UP (ref 3.5–5)
POTASSIUM SERPL-MCNC: 3.8 MMOL/L — SIGNIFICANT CHANGE UP (ref 3.5–5)
POTASSIUM SERPL-MCNC: 4.5 MMOL/L — SIGNIFICANT CHANGE UP (ref 3.5–5)
POTASSIUM SERPL-MCNC: 4.5 MMOL/L — SIGNIFICANT CHANGE UP (ref 3.5–5)
POTASSIUM SERPL-SCNC: 3.8 MMOL/L — SIGNIFICANT CHANGE UP (ref 3.5–5)
POTASSIUM SERPL-SCNC: 3.8 MMOL/L — SIGNIFICANT CHANGE UP (ref 3.5–5)
POTASSIUM SERPL-SCNC: 4.5 MMOL/L — SIGNIFICANT CHANGE UP (ref 3.5–5)
POTASSIUM SERPL-SCNC: 4.5 MMOL/L — SIGNIFICANT CHANGE UP (ref 3.5–5)
PROCALCITONIN SERPL-MCNC: 0.54 NG/ML — HIGH (ref 0.02–0.1)
PROCALCITONIN SERPL-MCNC: 0.54 NG/ML — HIGH (ref 0.02–0.1)
PROPOXYPHENE QUALITATIVE URINE RESULT: NEGATIVE — SIGNIFICANT CHANGE UP
PROPOXYPHENE QUALITATIVE URINE RESULT: NEGATIVE — SIGNIFICANT CHANGE UP
PROT SERPL-MCNC: 5.7 G/DL — LOW (ref 6–8)
PROT SERPL-MCNC: 5.7 G/DL — LOW (ref 6–8)
PROT SERPL-MCNC: 6.2 G/DL — SIGNIFICANT CHANGE UP (ref 6–8)
PROT SERPL-MCNC: 6.2 G/DL — SIGNIFICANT CHANGE UP (ref 6–8)
PROT UR-MCNC: 30 MG/DL
PROT UR-MCNC: 30 MG/DL
RBC # BLD: 4.11 M/UL — LOW (ref 4.2–5.4)
RBC # BLD: 4.11 M/UL — LOW (ref 4.2–5.4)
RBC # BLD: 4.31 M/UL — SIGNIFICANT CHANGE UP (ref 4.2–5.4)
RBC # BLD: 4.31 M/UL — SIGNIFICANT CHANGE UP (ref 4.2–5.4)
RBC # FLD: 14.6 % — HIGH (ref 11.5–14.5)
RBC # FLD: 14.6 % — HIGH (ref 11.5–14.5)
RBC # FLD: 14.7 % — HIGH (ref 11.5–14.5)
RBC # FLD: 14.7 % — HIGH (ref 11.5–14.5)
RBC CASTS # UR COMP ASSIST: 2 /HPF — SIGNIFICANT CHANGE UP (ref 0–4)
RBC CASTS # UR COMP ASSIST: 2 /HPF — SIGNIFICANT CHANGE UP (ref 0–4)
RSV RNA NPH QL NAA+NON-PROBE: SIGNIFICANT CHANGE UP
RSV RNA NPH QL NAA+NON-PROBE: SIGNIFICANT CHANGE UP
SALICYLATES SERPL-MCNC: <0.3 MG/DL — LOW (ref 4–30)
SALICYLATES SERPL-MCNC: <0.3 MG/DL — LOW (ref 4–30)
SARS-COV-2 RNA SPEC QL NAA+PROBE: SIGNIFICANT CHANGE UP
SARS-COV-2 RNA SPEC QL NAA+PROBE: SIGNIFICANT CHANGE UP
SODIUM SERPL-SCNC: 133 MMOL/L — LOW (ref 135–146)
SP GR SPEC: >1.03 — HIGH (ref 1–1.03)
SP GR SPEC: >1.03 — HIGH (ref 1–1.03)
SQUAMOUS # UR AUTO: 2 /HPF — SIGNIFICANT CHANGE UP (ref 0–5)
SQUAMOUS # UR AUTO: 2 /HPF — SIGNIFICANT CHANGE UP (ref 0–5)
T4 FREE+ TSH PNL SERPL: 0.32 UIU/ML — SIGNIFICANT CHANGE UP (ref 0.27–4.2)
T4 FREE+ TSH PNL SERPL: 0.32 UIU/ML — SIGNIFICANT CHANGE UP (ref 0.27–4.2)
THC UR QL: NEGATIVE — SIGNIFICANT CHANGE UP
THC UR QL: NEGATIVE — SIGNIFICANT CHANGE UP
UROBILINOGEN FLD QL: 0.2 MG/DL — SIGNIFICANT CHANGE UP (ref 0.2–1)
UROBILINOGEN FLD QL: 0.2 MG/DL — SIGNIFICANT CHANGE UP (ref 0.2–1)
WBC # BLD: 10.5 K/UL — SIGNIFICANT CHANGE UP (ref 4.8–10.8)
WBC # BLD: 10.5 K/UL — SIGNIFICANT CHANGE UP (ref 4.8–10.8)
WBC # BLD: 18.27 K/UL — HIGH (ref 4.8–10.8)
WBC # BLD: 18.27 K/UL — HIGH (ref 4.8–10.8)
WBC # FLD AUTO: 10.5 K/UL — SIGNIFICANT CHANGE UP (ref 4.8–10.8)
WBC # FLD AUTO: 10.5 K/UL — SIGNIFICANT CHANGE UP (ref 4.8–10.8)
WBC # FLD AUTO: 18.27 K/UL — HIGH (ref 4.8–10.8)
WBC # FLD AUTO: 18.27 K/UL — HIGH (ref 4.8–10.8)
WBC UR QL: 1 /HPF — SIGNIFICANT CHANGE UP (ref 0–5)
WBC UR QL: 1 /HPF — SIGNIFICANT CHANGE UP (ref 0–5)

## 2023-12-20 PROCEDURE — 99291 CRITICAL CARE FIRST HOUR: CPT

## 2023-12-20 PROCEDURE — 93312 ECHO TRANSESOPHAGEAL: CPT | Mod: 26

## 2023-12-20 PROCEDURE — 99223 1ST HOSP IP/OBS HIGH 75: CPT

## 2023-12-20 PROCEDURE — 99221 1ST HOSP IP/OBS SF/LOW 40: CPT

## 2023-12-20 PROCEDURE — 93970 EXTREMITY STUDY: CPT | Mod: 26

## 2023-12-20 PROCEDURE — 71045 X-RAY EXAM CHEST 1 VIEW: CPT | Mod: 26

## 2023-12-20 PROCEDURE — 93320 DOPPLER ECHO COMPLETE: CPT | Mod: 26

## 2023-12-20 PROCEDURE — 93325 DOPPLER ECHO COLOR FLOW MAPG: CPT | Mod: 26

## 2023-12-20 PROCEDURE — 93010 ELECTROCARDIOGRAM REPORT: CPT

## 2023-12-20 RX ORDER — NIFEDIPINE 30 MG
30 TABLET, EXTENDED RELEASE 24 HR ORAL ONCE
Refills: 0 | Status: COMPLETED | OUTPATIENT
Start: 2023-12-20 | End: 2023-12-20

## 2023-12-20 RX ORDER — VANCOMYCIN HCL 1 G
750 VIAL (EA) INTRAVENOUS EVERY 12 HOURS
Refills: 0 | Status: DISCONTINUED | OUTPATIENT
Start: 2023-12-20 | End: 2023-12-20

## 2023-12-20 RX ORDER — KETOROLAC TROMETHAMINE 30 MG/ML
15 SYRINGE (ML) INJECTION ONCE
Refills: 0 | Status: DISCONTINUED | OUTPATIENT
Start: 2023-12-20 | End: 2023-12-20

## 2023-12-20 RX ORDER — MAGNESIUM SULFATE 500 MG/ML
2 VIAL (ML) INJECTION ONCE
Refills: 0 | Status: DISCONTINUED | OUTPATIENT
Start: 2023-12-20 | End: 2023-12-20

## 2023-12-20 RX ORDER — AMITRIPTYLINE HCL 25 MG
25 TABLET ORAL AT BEDTIME
Refills: 0 | Status: DISCONTINUED | OUTPATIENT
Start: 2023-12-20 | End: 2023-12-21

## 2023-12-20 RX ORDER — ACETAMINOPHEN 500 MG
975 TABLET ORAL EVERY 8 HOURS
Refills: 0 | Status: DISCONTINUED | OUTPATIENT
Start: 2023-12-20 | End: 2023-12-21

## 2023-12-20 RX ORDER — MAGNESIUM SULFATE 500 MG/ML
2 VIAL (ML) INJECTION
Refills: 0 | Status: COMPLETED | OUTPATIENT
Start: 2023-12-20 | End: 2023-12-20

## 2023-12-20 RX ORDER — AMITRIPTYLINE HCL 25 MG
25 TABLET ORAL DAILY
Refills: 0 | Status: DISCONTINUED | OUTPATIENT
Start: 2023-12-20 | End: 2023-12-20

## 2023-12-20 RX ORDER — PANTOPRAZOLE SODIUM 20 MG/1
40 TABLET, DELAYED RELEASE ORAL DAILY
Refills: 0 | Status: DISCONTINUED | OUTPATIENT
Start: 2023-12-20 | End: 2023-12-21

## 2023-12-20 RX ORDER — CHLORHEXIDINE GLUCONATE 213 G/1000ML
15 SOLUTION TOPICAL EVERY 12 HOURS
Refills: 0 | Status: DISCONTINUED | OUTPATIENT
Start: 2023-12-20 | End: 2023-12-20

## 2023-12-20 RX ORDER — ESCITALOPRAM OXALATE 10 MG/1
10 TABLET, FILM COATED ORAL DAILY
Refills: 0 | Status: DISCONTINUED | OUTPATIENT
Start: 2023-12-20 | End: 2023-12-29

## 2023-12-20 RX ORDER — POTASSIUM PHOSPHATE, MONOBASIC POTASSIUM PHOSPHATE, DIBASIC 236; 224 MG/ML; MG/ML
15 INJECTION, SOLUTION INTRAVENOUS ONCE
Refills: 0 | Status: COMPLETED | OUTPATIENT
Start: 2023-12-20 | End: 2023-12-20

## 2023-12-20 RX ORDER — AZITHROMYCIN 500 MG/1
500 TABLET, FILM COATED ORAL EVERY 24 HOURS
Refills: 0 | Status: DISCONTINUED | OUTPATIENT
Start: 2023-12-20 | End: 2023-12-26

## 2023-12-20 RX ORDER — PANTOPRAZOLE SODIUM 20 MG/1
40 TABLET, DELAYED RELEASE ORAL
Refills: 0 | Status: DISCONTINUED | OUTPATIENT
Start: 2023-12-20 | End: 2023-12-20

## 2023-12-20 RX ORDER — ESCITALOPRAM OXALATE 10 MG/1
1 TABLET, FILM COATED ORAL
Qty: 0 | Refills: 0 | DISCHARGE

## 2023-12-20 RX ORDER — HYDRALAZINE HCL 50 MG
10 TABLET ORAL ONCE
Refills: 0 | Status: COMPLETED | OUTPATIENT
Start: 2023-12-20 | End: 2023-12-20

## 2023-12-20 RX ORDER — ENOXAPARIN SODIUM 100 MG/ML
40 INJECTION SUBCUTANEOUS EVERY 24 HOURS
Refills: 0 | Status: DISCONTINUED | OUTPATIENT
Start: 2023-12-20 | End: 2023-12-29

## 2023-12-20 RX ORDER — VANCOMYCIN HCL 1 G
500 VIAL (EA) INTRAVENOUS EVERY 12 HOURS
Refills: 0 | Status: DISCONTINUED | OUTPATIENT
Start: 2023-12-20 | End: 2023-12-21

## 2023-12-20 RX ORDER — CEFTRIAXONE 500 MG/1
1000 INJECTION, POWDER, FOR SOLUTION INTRAMUSCULAR; INTRAVENOUS EVERY 24 HOURS
Refills: 0 | Status: COMPLETED | OUTPATIENT
Start: 2023-12-20 | End: 2023-12-24

## 2023-12-20 RX ORDER — ACETAMINOPHEN 500 MG
650 TABLET ORAL ONCE
Refills: 0 | Status: COMPLETED | OUTPATIENT
Start: 2023-12-20 | End: 2023-12-20

## 2023-12-20 RX ORDER — VANCOMYCIN HCL 1 G
1000 VIAL (EA) INTRAVENOUS ONCE
Refills: 0 | Status: COMPLETED | OUTPATIENT
Start: 2023-12-20 | End: 2023-12-20

## 2023-12-20 RX ADMIN — Medication 25 GRAM(S): at 12:45

## 2023-12-20 RX ADMIN — Medication 100 MILLIGRAM(S): at 22:04

## 2023-12-20 RX ADMIN — Medication 15 MILLIGRAM(S): at 04:00

## 2023-12-20 RX ADMIN — Medication 975 MILLIGRAM(S): at 12:44

## 2023-12-20 RX ADMIN — Medication 10 MILLIGRAM(S): at 03:52

## 2023-12-20 RX ADMIN — Medication 75 MILLIGRAM(S): at 09:27

## 2023-12-20 RX ADMIN — ESCITALOPRAM OXALATE 10 MILLIGRAM(S): 10 TABLET, FILM COATED ORAL at 12:45

## 2023-12-20 RX ADMIN — Medication 650 MILLIGRAM(S): at 02:41

## 2023-12-20 RX ADMIN — Medication 25 GRAM(S): at 11:36

## 2023-12-20 RX ADMIN — Medication 100 MILLIGRAM(S): at 00:00

## 2023-12-20 RX ADMIN — Medication 25 MILLIGRAM(S): at 21:43

## 2023-12-20 RX ADMIN — Medication 30 MILLIGRAM(S): at 20:51

## 2023-12-20 RX ADMIN — SODIUM CHLORIDE 2000 MILLILITER(S): 9 INJECTION, SOLUTION INTRAVENOUS at 00:17

## 2023-12-20 RX ADMIN — Medication 75 MILLIGRAM(S): at 17:11

## 2023-12-20 RX ADMIN — CHLORHEXIDINE GLUCONATE 15 MILLILITER(S): 213 SOLUTION TOPICAL at 05:44

## 2023-12-20 RX ADMIN — Medication 975 MILLIGRAM(S): at 13:14

## 2023-12-20 RX ADMIN — ENOXAPARIN SODIUM 40 MILLIGRAM(S): 100 INJECTION SUBCUTANEOUS at 10:45

## 2023-12-20 RX ADMIN — POTASSIUM PHOSPHATE, MONOBASIC POTASSIUM PHOSPHATE, DIBASIC 63.75 MILLIMOLE(S): 236; 224 INJECTION, SOLUTION INTRAVENOUS at 13:23

## 2023-12-20 RX ADMIN — Medication 100 MILLIGRAM(S): at 05:44

## 2023-12-20 RX ADMIN — Medication 250 MILLIGRAM(S): at 09:27

## 2023-12-20 RX ADMIN — AZITHROMYCIN 255 MILLIGRAM(S): 500 TABLET, FILM COATED ORAL at 00:00

## 2023-12-20 RX ADMIN — Medication 25 GRAM(S): at 15:03

## 2023-12-20 RX ADMIN — CEFTRIAXONE 100 MILLIGRAM(S): 500 INJECTION, POWDER, FOR SOLUTION INTRAMUSCULAR; INTRAVENOUS at 00:00

## 2023-12-20 RX ADMIN — CEFTRIAXONE 100 MILLIGRAM(S): 500 INJECTION, POWDER, FOR SOLUTION INTRAMUSCULAR; INTRAVENOUS at 10:45

## 2023-12-20 RX ADMIN — AZITHROMYCIN 255 MILLIGRAM(S): 500 TABLET, FILM COATED ORAL at 22:04

## 2023-12-20 RX ADMIN — PANTOPRAZOLE SODIUM 40 MILLIGRAM(S): 20 TABLET, DELAYED RELEASE ORAL at 12:44

## 2023-12-20 NOTE — ED ADULT NURSE NOTE - CHIEF COMPLAINT QUOTE
Patient bibems from home as per EMS pts son called pt overdosed on one of her medications unsure which one. Pt transferred to [facility] Patient bibems from home as per EMS pts son called pt overdosed on one of her medications unsure which one. Pt transferred to [facility]

## 2023-12-20 NOTE — PATIENT PROFILE ADULT - FALL HARM RISK - HARM RISK INTERVENTIONS
Assistance with ambulation/Assistance OOB with selected safe patient handling equipment/Communicate Risk of Fall with Harm to all staff/Discuss with provider need for PT consult/Monitor gait and stability/Provide patient with walking aids - walker, cane, crutches/Reinforce activity limits and safety measures with patient and family/Tailored Fall Risk Interventions/Visual Cue: Yellow wristband and red socks/Bed in lowest position, wheels locked, appropriate side rails in place/Call bell, personal items and telephone in reach/Instruct patient to call for assistance before getting out of bed or chair/Non-slip footwear when patient is out of bed/[place] to call system/Physically safe environment - no spills, clutter or unnecessary equipment/Purposeful Proactive Rounding/Room/bathroom lighting operational, light cord in reach Assistance with ambulation/Assistance OOB with selected safe patient handling equipment/Communicate Risk of Fall with Harm to all staff/Discuss with provider need for PT consult/Monitor gait and stability/Provide patient with walking aids - walker, cane, crutches/Reinforce activity limits and safety measures with patient and family/Tailored Fall Risk Interventions/Visual Cue: Yellow wristband and red socks/Bed in lowest position, wheels locked, appropriate side rails in place/Call bell, personal items and telephone in reach/Instruct patient to call for assistance before getting out of bed or chair/Non-slip footwear when patient is out of bed/[place] to call system/Physically safe environment - no spills, clutter or unnecessary equipment/Purposeful Proactive Rounding/Room/bathroom lighting operational, light cord in reach

## 2023-12-20 NOTE — CONSULT NOTE ADULT - ATTENDING COMMENTS
IMPRESSION:    Acute Hypoxemic Respiratory Failure SP Intubation 12/19  Influenza A Pneumonia   Possible superimposed bacterial infection   Toxic Metabolic Encephalopathy   HO Fibromyalgia   HO Hypothyroidism   HO Anxiety     Plan as outlined above

## 2023-12-20 NOTE — PHARMACOTHERAPY INTERVENTION NOTE - COMMENTS
azithromycin 500mg IV x1 administered @ 0000, azithromycin 500mg IV daily, recom adjust start time to 2200

## 2023-12-20 NOTE — PROGRESS NOTE ADULT - SUBJECTIVE AND OBJECTIVE BOX
Patient is a 63y old  Female who presents with a chief complaint of AMS (19 Dec 2023 23:56)      HPI: 62 yo F with PMHx of anxiety/MDD, Hypothyroidism and fibromyalgia BIBEMS to the hospital for AMS. Family called EMS because they found patient obtunded and altered at home today, they were concerned that patient may have overdosed on her sleeping pills. Family do describe that for the past 2 days patient has been having a cough, yesterday evening she started becoming progressively lethargic and thus he called EMS. Son denies any fever chills, seizure like activity fall or trauma while at home. Further history unable to be assessed at this moment.  Upon arrival a code stroke was called for AMS with CTA head neg for large vessel occlusion. In the ED patient was hypertensive and hypoxemic. The decision to intubate her was done for protection of airways. Patient admitted to ICU for AHRF 2/2 bilateral pneumonia       PAST MEDICAL & SURGICAL HISTORY:  Fibromyalgia      Eczema, unspecified type      Depression, unspecified depression type      History of surgery  RIGHT HIP REPLACEMENT      History of surgery  BACK SURGERY            FAMILY HISTORY:  :  No known cardiovacular family hisotry     Review Of Systems:     All ROS are negative except per HPI       Allergies    No Known Allergies    Intolerances        Vital Signs Last 24 Hrs  T(C): 38.4 (20 Dec 2023 07:00), Max: 39.3 (20 Dec 2023 04:00)  T(F): 101.1 (20 Dec 2023 07:00), Max: 102.7 (20 Dec 2023 04:00)  HR: 92 (20 Dec 2023 07:00) (92 - 120)  BP: 113/58 (20 Dec 2023 07:00) (109/55 - 240/144)  BP(mean): 81 (20 Dec 2023 07:00) (77 - 133)  RR: 14 (20 Dec 2023 07:00) (14 - 18)  SpO2: 100% (20 Dec 2023 07:00) (99% - 100%)    O2 Parameters below as of 20 Dec 2023 07:00  Patient On (Oxygen Delivery Method): ventilator  O2 Flow (L/min): 40  O2 Concentration (%): 100        CONSTITUTIONAL:  NAD    ENT:   Airway patent,   Mouth with normal mucosa.   No thrush      CARDIAC:   Normal rate,   Regular rhythm.          RESPIRATORY:   No wheezing  Bilateral BS   Not tachypneic,  No use of accessory muscles    GASTROINTESTINAL:  Abdomen soft,   Non-tender,   No guarding,   + BS      NEUROLOGICAL:   sedated    SKIN:   Skin normal color for race,                   LABS:                          12.9   9.11  )-----------( 200      ( 19 Dec 2023 22:17 )             40.1                                               12-19    133<L>  |  94<L>  |  21<H>  ----------------------------<  141<H>  4.9   |  27  |  1.0    Ca    8.9      19 Dec 2023 22:17    TPro  7.5  /  Alb  4.0  /  TBili  0.2  /  DBili  x   /  AST  34  /  ALT  75<H>  /  AlkPhos  137<H>  12-19      PT/INR - ( 19 Dec 2023 22:17 )   PT: 11.10 sec;   INR: 0.97 ratio         PTT - ( 19 Dec 2023 22:17 )  PTT:25.6 sec                                       Urinalysis Basic - ( 20 Dec 2023 00:05 )    Color: Yellow / Appearance: Clear / SG: >1.030 / pH: x  Gluc: x / Ketone: Negative mg/dL  / Bili: Negative / Urobili: 0.2 mg/dL   Blood: x / Protein: 30 mg/dL / Nitrite: Negative   Leuk Esterase: Negative / RBC: 2 /HPF / WBC 1 /HPF   Sq Epi: x / Non Sq Epi: 2 /HPF / Bacteria: Negative /HPF                                                  LIVER FUNCTIONS - ( 19 Dec 2023 22:17 )  Alb: 4.0 g/dL / Pro: 7.5 g/dL / ALK PHOS: 137 U/L / ALT: 75 U/L / AST: 34 U/L / GGT: x                                                                                               Mode: AC/ CMV (Assist Control/ Continuous Mandatory Ventilation)  RR (machine): 14  TV (machine): 400  FiO2: 40  PEEP: 8  ITime: 1  MAP: 10  PIP: 24                                            MEDICATIONS  (STANDING):  amitriptyline 25 milliGRAM(s) Oral daily  cefTRIAXone   IVPB 1000 milliGRAM(s) IV Intermittent every 24 hours  chlorhexidine 0.12% Liquid 15 milliLiter(s) Oral Mucosa every 12 hours  doxycycline IVPB 100 milliGRAM(s) IV Intermittent every 12 hours  enoxaparin Injectable 40 milliGRAM(s) SubCutaneous every 24 hours  escitalopram 10 milliGRAM(s) Oral daily  pantoprazole    Tablet 40 milliGRAM(s) Oral before breakfast  propofol Infusion 20 MICROgram(s)/kG/Min (7.8 mL/Hr) IV Continuous <Continuous>    MEDICATIONS  (PRN):      CXR reviewed      CT CHEST W/ IV:    IMPRESSION:    1.  Bilateral consolidative and groundglass opacities as above. Correlate   for infectious etiologies.  2.  Multiple thyroid hypodensities. Recommended further evaluation with   nonemergent outpatient thyroid sonogram.

## 2023-12-20 NOTE — ED ADULT NURSE NOTE - OBJECTIVE STATEMENT
Pt BIBA Patient BIBA from home as per EMS pts son called and said patient overdosed on one of her medications unsure which one. Upon assessment patient is lethargic, with labored breathing with retractions.

## 2023-12-20 NOTE — PHARMACOTHERAPY INTERVENTION NOTE - COMMENTS
Recommended to order a Legionella urinary antigen since patient has been empirically started on doxycycline for the treatment of pneumonia.    Mikaela Ramirez, PharmD   Clinical Pharmacy Specialist, Infectious Diseases  Tele-Antimicrobial Stewardship Program (Tele-ASP)  Tele-ASP Phone: [phone]     Recommended to order a Legionella urinary antigen since patient has been empirically started on doxycycline for the treatment of pneumonia.    Mikaela Ramirez, PharmD   Clinical Pharmacy Specialist, Infectious Diseases  Tele-Antimicrobial Stewardship Program (Tele-ASP)  Tele-ASP Phone: [phone]

## 2023-12-20 NOTE — CONSULT NOTE ADULT - ASSESSMENT
IMPRESSION:    Acute Hypoxemic Respiratory Failure SP Intubation 12/19  Influenza A Pneumonia   Toxic Metabolic Encephalopathy   HO Fibromyalgia   HO Hypothyroidism   HO Anxiety       PLAN:    CNS: SAT, CTH unremarkable, f/u drug/urine toxicology, Neuroendovascular eval for left cerebellar venous anomaly     HEENT: Oral care    PULMONARY:  HOB @ 45 degrees.  Aspiration precautions. SBT, monitor Pplat/Ppeak/DP    CARDIOVASCULAR: 2D-Echo, not on pressors, troponin - x 1    GI: GI prophylaxis.  Hold feeds for SBT.  Bowel regimen     RENAL:  Follow up lytes.  Correct as needed    INFECTIOUS DISEASE: Follow up cultures, Tamiflu, f/u procalcitonin, nasal MRSA, urine legionella and strep Ag    HEMATOLOGICAL:  DVT prophylaxis.    ENDOCRINE:  Follow up FS.  Insulin protocol if needed.    MUSCULOSKELETAL: bedrest    Goals of care    MICU monitoring IMPRESSION:    Acute Hypoxemic Respiratory Failure SP Intubation 12/19  Influenza A Pneumonia   Toxic Metabolic Encephalopathy   HO Fibromyalgia   HO Hypothyroidism   HO Anxiety       PLAN:    CNS: SAT, CTH unremarkable, f/u drug/urine toxicology, Neuroendovascular eval for left cerebellar venous anomaly     HEENT: Oral care    PULMONARY:  HOB @ 45 degrees.  Aspiration precautions. SBT, monitor Pplat/Ppeak/DP    CARDIOVASCULAR: 2D-Echo, not on pressors, troponin - x 1    GI: GI prophylaxis.  Hold feeds for SBT.  Bowel regimen     RENAL:  Follow up lytes.  Correct as needed    INFECTIOUS DISEASE: Follow up cultures, Tamiflu, Ceftriaxone, Vancomycin, f/u procalcitonin, nasal MRSA, urine legionella and strep Ag    HEMATOLOGICAL:  DVT prophylaxis.    ENDOCRINE:  Follow up FS.  Insulin protocol if needed.    MUSCULOSKELETAL: bedrest    Goals of care    MICU monitoring IMPRESSION:    Acute Hypoxemic Respiratory Failure SP Intubation 12/19  Influenza A Pneumonia   Possible superimposed bacterial infection   Toxic Metabolic Encephalopathy   HO Fibromyalgia   HO Hypothyroidism   HO Anxiety       PLAN:    CNS: SAT, CTH unremarkable, f/u drug/urine toxicology, Neuroendovascular eval for left cerebellar venous anomaly     HEENT: Oral care.  ET care     PULMONARY:  HOB @ 45 degrees.  Aspiration precautions. SBT, monitor Pplat/Ppeak/DP  DTA     CARDIOVASCULAR: 2D-Echo, not on pressors, troponin - x 1    GI: GI prophylaxis.  Hold feeds for SBT.  Bowel regimen     RENAL:  Follow up lytes.  Correct as needed    INFECTIOUS DISEASE: Follow up cultures, Tamiflu, Ceftriaxone, Vancomycin, and azithromycin.  f/u procalcitonin, nasal MRSA, urine legionella and strep Ag    HEMATOLOGICAL:  DVT prophylaxis.  Dimer     ENDOCRINE:  Follow up FS.  Insulin protocol if needed.    MUSCULOSKELETAL: bedrest.  off loading     Goals of care    MICU monitoring

## 2023-12-20 NOTE — CONSULT NOTE ADULT - LOCATION OF DISCUSSION
Tetracycline Pregnancy And Lactation Text: This medication is Pregnancy Category D and not consider safe during pregnancy. It is also excreted in breast milk. High Dose Vitamin A Counseling: Side effects reviewed, pt to contact office should one occur. Include Pregnancy/Lactation Warning?: No High Dose Vitamin A Pregnancy And Lactation Text: High dose vitamin A therapy is contraindicated during pregnancy and breast feeding. Benzoyl Peroxide Counseling: Patient counseled that medicine may cause skin irritation and bleach clothing.  In the event of skin irritation, the patient was advised to reduce the amount of the drug applied or use it less frequently.   The patient verbalized understanding of the proper use and possible adverse effects of benzoyl peroxide.  All of the patient's questions and concerns were addressed. Erythromycin Counseling:  I discussed with the patient the risks of erythromycin including but not limited to GI upset, allergic reaction, drug rash, diarrhea, increase in liver enzymes, and yeast infections. Dapsone Pregnancy And Lactation Text: This medication is Pregnancy Category C and is not considered safe during pregnancy or breast feeding. Bactrim Counseling:  I discussed with the patient the risks of sulfa antibiotics including but not limited to GI upset, allergic reaction, drug rash, diarrhea, dizziness, photosensitivity, and yeast infections.  Rarely, more serious reactions can occur including but not limited to aplastic anemia, agranulocytosis, methemoglobinemia, blood dyscrasias, liver or kidney failure, lung infiltrates or desquamative/blistering drug rashes. Topical Retinoid counseling:  Patient advised to apply a pea-sized amount only at bedtime and wait 30 minutes after washing their face before applying.  If too drying, patient may add a non-comedogenic moisturizer. The patient verbalized understanding of the proper use and possible adverse effects of retinoids.  All of the patient's questions and concerns were addressed. Azithromycin Pregnancy And Lactation Text: This medication is considered safe during pregnancy and is also secreted in breast milk. Doxycycline Pregnancy And Lactation Text: This medication is Pregnancy Category D and not consider safe during pregnancy. It is also excreted in breast milk but is considered safe for shorter treatment courses. Benzoyl Peroxide Pregnancy And Lactation Text: This medication is Pregnancy Category C. It is unknown if benzoyl peroxide is excreted in breast milk. Spironolactone Counseling: Patient advised regarding risks of diarrhea, abdominal pain, hyperkalemia, birth defects (for female patients), liver toxicity and renal toxicity. The patient may need blood work to monitor liver and kidney function and potassium levels while on therapy. The patient verbalized understanding of the proper use and possible adverse effects of spironolactone.  All of the patient's questions and concerns were addressed. Bactrim Pregnancy And Lactation Text: This medication is Pregnancy Category D and is known to cause fetal risk.  It is also excreted in breast milk. Azithromycin Counseling:  I discussed with the patient the risks of azithromycin including but not limited to GI upset, allergic reaction, drug rash, diarrhea, and yeast infections. Erythromycin Pregnancy And Lactation Text: This medication is Pregnancy Category B and is considered safe during pregnancy. It is also excreted in breast milk. Topical Sulfur Applications Counseling: Topical Sulfur Counseling: Patient counseled that this medication may cause skin irritation or allergic reactions.  In the event of skin irritation, the patient was advised to reduce the amount of the drug applied or use it less frequently.   The patient verbalized understanding of the proper use and possible adverse effects of topical sulfur application.  All of the patient's questions and concerns were addressed. Tazorac Pregnancy And Lactation Text: This medication is not safe during pregnancy. It is unknown if this medication is excreted in breast milk. Topical Sulfur Applications Pregnancy And Lactation Text: This medication is Pregnancy Category C and has an unknown safety profile during pregnancy. It is unknown if this topical medication is excreted in breast milk. Topical Clindamycin Counseling: Patient counseled that this medication may cause skin irritation or allergic reactions.  In the event of skin irritation, the patient was advised to reduce the amount of the drug applied or use it less frequently.   The patient verbalized understanding of the proper use and possible adverse effects of clindamycin.  All of the patient's questions and concerns were addressed. Isotretinoin Counseling: Patient should get monthly blood tests, not donate blood, not drive at night if vision affected, not share medication, and not undergo elective surgery for 6 months after tx completed. Side effects reviewed, pt to contact office should one occur. Spironolactone Pregnancy And Lactation Text: This medication can cause feminization of the male fetus and should be avoided during pregnancy. The active metabolite is also found in breast milk. Minocycline Counseling: Patient advised regarding possible photosensitivity and discoloration of the teeth, skin, lips, tongue and gums.  Patient instructed to avoid sunlight, if possible.  When exposed to sunlight, patients should wear protective clothing, sunglasses, and sunscreen.  The patient was instructed to call the office immediately if the following severe adverse effects occur:  hearing changes, easy bruising/bleeding, severe headache, or vision changes.  The patient verbalized understanding of the proper use and possible adverse effects of minocycline.  All of the patient's questions and concerns were addressed. Doxycycline Counseling:  Patient counseled regarding possible photosensitivity and increased risk for sunburn.  Patient instructed to avoid sunlight, if possible.  When exposed to sunlight, patients should wear protective clothing, sunglasses, and sunscreen.  The patient was instructed to call the office immediately if the following severe adverse effects occur:  hearing changes, easy bruising/bleeding, severe headache, or vision changes.  The patient verbalized understanding of the proper use and possible adverse effects of doxycycline.  All of the patient's questions and concerns were addressed. Birth Control Pills Pregnancy And Lactation Text: This medication should be avoided if pregnant and for the first 30 days post-partum. Detail Level: Zone Topical Retinoid Pregnancy And Lactation Text: This medication is Pregnancy Category C. It is unknown if this medication is excreted in breast milk. Dapsone Counseling: I discussed with the patient the risks of dapsone including but not limited to hemolytic anemia, agranulocytosis, rashes, methemoglobinemia, kidney failure, peripheral neuropathy, headaches, GI upset, and liver toxicity.  Patients who start dapsone require monitoring including baseline LFTs and weekly CBCs for the first month, then every month thereafter.  The patient verbalized understanding of the proper use and possible adverse effects of dapsone.  All of the patient's questions and concerns were addressed. Tazorac Counseling:  Patient advised that medication is irritating and drying.  Patient may need to apply sparingly and wash off after an hour before eventually leaving it on overnight.  The patient verbalized understanding of the proper use and possible adverse effects of tazorac.  All of the patient's questions and concerns were addressed. Birth Control Pills Counseling: Birth Control Pill Counseling: I discussed with the patient the potential side effects of OCPs including but not limited to increased risk of stroke, heart attack, thrombophlebitis, deep venous thrombosis, hepatic adenomas, breast changes, GI upset, headaches, and depression.  The patient verbalized understanding of the proper use and possible adverse effects of OCPs. All of the patient's questions and concerns were addressed. Topical Clindamycin Pregnancy And Lactation Text: This medication is Pregnancy Category B and is considered safe during pregnancy. It is unknown if it is excreted in breast milk. Isotretinoin Pregnancy And Lactation Text: This medication is Pregnancy Category X and is considered extremely dangerous during pregnancy. It is unknown if it is excreted in breast milk. Tetracycline Counseling: Patient counseled regarding possible photosensitivity and increased risk for sunburn.  Patient instructed to avoid sunlight, if possible.  When exposed to sunlight, patients should wear protective clothing, sunglasses, and sunscreen.  The patient was instructed to call the office immediately if the following severe adverse effects occur:  hearing changes, easy bruising/bleeding, severe headache, or vision changes.  The patient verbalized understanding of the proper use and possible adverse effects of tetracycline.  All of the patient's questions and concerns were addressed. Patient understands to avoid pregnancy while on therapy due to potential birth defects. Telephone

## 2023-12-20 NOTE — CONSULT NOTE ADULT - ASSESSMENT
63y woman with history of anxiety/MDD, hypothyroidism, fibromyalgia presented with AMS and lethargy.  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  Patient now medically extubated. Palliative care consulted for GOC.    Called and spoke with Rylan over the phone. Palliative care introduce.  He was able to provide a medical history and hospital course. He noted the patient has some medical issues, but she was functioning well prior to hospitalization.  I provided a medical update and noted the patient was now off the ventilator.  We discussed overall GOC and code status. He wishes for the patient to be Full code and be reintubated as needed.  All questions answered.      MEDD (morphine equivalent daily dose):    Education about palliative care provided to patient/family.  See Recs below.    Please call [phone] with questions or concerns 24/7.   We will continue to follow.    63y woman with history of anxiety/MDD, hypothyroidism, fibromyalgia presented with AMS and lethargy.  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  Patient now medically extubated. Palliative care consulted for GOC.    Called and spoke with Rylan over the phone. Palliative care introduce.  He was able to provide a medical history and hospital course. He noted the patient has some medical issues, but she was functioning well prior to hospitalization.  I provided a medical update and noted the patient was now off the ventilator.  We discussed overall GOC and code status. He wishes for the patient to be Full code and be reintubated as needed.  All questions answered.      MEDD (morphine equivalent daily dose):    Education about palliative care provided to patient/family.  See Recs below.    Please call [phone] with questions or concerns 24/7.   We will continue to follow.

## 2023-12-20 NOTE — PROGRESS NOTE ADULT - SUBJECTIVE AND OBJECTIVE BOX
MICU RESIDENT PROGRESS NOTE    Patient is a 63y old  Female who presents with a chief complaint of AHRF (20 Dec 2023 11:02)        INTERVAL HPI/OVERNIGHT EVENTS:   Overnight, no acute events.      ICU Vital Signs Last 24 Hrs  T(C): 37.7 (20 Dec 2023 12:00), Max: 39.3 (20 Dec 2023 04:00)  T(F): 99.8 (20 Dec 2023 12:00), Max: 102.7 (20 Dec 2023 04:00)  HR: 101 (20 Dec 2023 13:00) (92 - 120)  BP: 178/75 (20 Dec 2023 13:00) (109/55 - 240/144)  BP(mean): 108 (20 Dec 2023 13:00) (77 - 133)  ABP: --  ABP(mean): --  RR: 25 (20 Dec 2023 13:00) (14 - 25)  SpO2: 98% (20 Dec 2023 13:00) (98% - 100%)    O2 Parameters below as of 20 Dec 2023 08:30  Patient On (Oxygen Delivery Method): ventilator  O2 Flow (L/min): 40  O2 Concentration (%): 100      I&O's Summary    20 Dec 2023 07:01  -  20 Dec 2023 13:34  --------------------------------------------------------  IN: 150 mL / OUT: 475 mL / NET: -325 mL      Mode: AC/ CMV (Assist Control/ Continuous Mandatory Ventilation)  RR (machine): 14  TV (machine): 400  FiO2: 40  PEEP: 8  ITime: 1  MAP: 13  PIP: 24      LABS:                        10.9   10.50 )-----------( 172      ( 20 Dec 2023 07:19 )             33.5     12-20    133<L>  |  97<L>  |  15  ----------------------------<  121<H>  3.8   |  24  |  1.0    Ca    7.7<L>      20 Dec 2023 07:19  Phos  1.7     12-20  Mg     1.4     12-20    TPro  5.7<L>  /  Alb  3.1<L>  /  TBili  <0.2  /  DBili  x   /  AST  29  /  ALT  55<H>  /  AlkPhos  105  12-20    PT/INR - ( 19 Dec 2023 22:17 )   PT: 11.10 sec;   INR: 0.97 ratio         PTT - ( 19 Dec 2023 22:17 )  PTT:25.6 sec  Urinalysis Basic - ( 20 Dec 2023 07:19 )    Color: x / Appearance: x / SG: x / pH: x  Gluc: 121 mg/dL / Ketone: x  / Bili: x / Urobili: x   Blood: x / Protein: x / Nitrite: x   Leuk Esterase: x / RBC: x / WBC x   Sq Epi: x / Non Sq Epi: x / Bacteria: x      CAPILLARY BLOOD GLUCOSE  118 (19 Dec 2023 22:21)      POCT Blood Glucose.: 118 mg/dL (19 Dec 2023 21:59)        RADIOLOGY & ADDITIONAL TESTS:    Consultant(s) Notes Reviewed:  [x ] YES  [ ] NO    MEDICATIONS  (STANDING):  amitriptyline 25 milliGRAM(s) Oral at bedtime  azithromycin  IVPB 500 milliGRAM(s) IV Intermittent every 24 hours  cefTRIAXone   IVPB 1000 milliGRAM(s) IV Intermittent every 24 hours  chlorhexidine 0.12% Liquid 15 milliLiter(s) Oral Mucosa every 12 hours  enoxaparin Injectable 40 milliGRAM(s) SubCutaneous every 24 hours  escitalopram 10 milliGRAM(s) Oral daily  magnesium sulfate  IVPB 2 Gram(s) IV Intermittent every 2 hours  oseltamivir 75 milliGRAM(s) Oral two times a day  pantoprazole   Suspension 40 milliGRAM(s) Oral daily  propofol Infusion 20 MICROgram(s)/kG/Min (7.8 mL/Hr) IV Continuous <Continuous>  vancomycin  IVPB 500 milliGRAM(s) IV Intermittent every 12 hours    MEDICATIONS  (PRN):  acetaminophen     Tablet .. 975 milliGRAM(s) Oral every 8 hours PRN Temp greater or equal to 38.5C (101.3F)      PHYSICAL EXAM:  CONSTITUTIONAL: NAD  ENT: Airway patent, Mouth with normal mucosa. No thrush  CARDIAC: Normal rate, Regular rhythm.    RESPIRATORY: No wheezing, Bilateral BS, Not tachypneic, No use of accessory muscles  GASTROINTESTINAL: Abdomen soft, Non-tender, No guarding, + BS  NEUROLOGICAL: Sedated  SKIN: Skin normal color for race

## 2023-12-20 NOTE — CONSULT NOTE ADULT - SUBJECTIVE AND OBJECTIVE BOX
CC: obtunded    HPI:  64 yo F with PMHx of anxiety/MDD, Hypothyroidism and fibromyalgia BIBEMS to the hospital for AMS. Family called EMS because they found patient obtunded and altered at home today, they were concerned that patient may have overdosed on her sleeping pills. Family do describe that for the past 2 days patient has been having a cough, yesterday evening she started becoming progressively lethargic and thus he called EMS. Son denies any fever chills, seizure like activity fall or trauma while at home. Further history unable to be assessed at this moment.     Upon arrival a code stroke was called for AMS with CTA head neg for large vessel occlusion. In the ED patient was hypertensive and hypoxemic. The decision to intubate her was done for protection of airways. Patient admitted to ICU for AHRF 2/2 bilateral pneumonia       Vital Signs Last 24 Hrs  T(C): --  T(F): --  HR: 102 (19 Dec 2023 21:52) (102 - 102)  BP: 208/98 (19 Dec 2023 21:52) (208/98 - 208/98)  BP(mean): --  RR: 18 (19 Dec 2023 21:52) (18 - 18)  SpO2: 99% (19 Dec 2023 21:52) (99% - 99%)    Parameters below as of 19 Dec 2023 21:52  Patient On (Oxygen Delivery Method): nasal cannula  O2 Flow (L/min): 3   (19 Dec 2023 23:17)    PERTINENT PM/SXH:   Fibromyalgia    Eczema, unspecified type    Depression, unspecified depression type      Depression, acute    History of surgery    History of surgery      FAMILY HISTORY:  no pertinent history    ITEMS NOT CHECKED ARE NOT PRESENT    SOCIAL HISTORY:   Significant other/partner[ ]  Children[ ]  Mormon/Spirituality:  Substance hx:  [ ]   Tobacco hx:  [ ]   Alcohol hx: [ ]   Living Situation: [ x]Home  [ ]Long term care  [ ]Rehab [ ]Other  Home Services: [ ] HHA [ ] Gracy RN [ ] Hospice  Occupation:  Home Opioid hx:  [ ] Y [ ] N [x ] I-Stop Reference No:    Reference #: [id number]      Practitioner Count: 2  Pharmacy Count: 1  Current Opioid Prescriptions: 1  Current Benzodiazepine Prescriptions: 1  Current Stimulant Prescriptions: 0      Patient Demographic Information (PDI)       PDI	First Name	Last Name	Birth Date	Gender	Street Address	City	State	Zip Code  SHILOH Acevedo	1960	Female	[address] [facility]	[address]    Prescription Information      PDI Filter:    PDI	Current Rx	Drug Type	Rx Written	Rx Dispensed	Drug	Quantity	Days Supply	Prescriber Name	Prescriber TENA #	Payment Method	Dispenser  A	Y	B	12/12/2023	12/13/2023	lorazepam 1 mg tablet	60	30	Kelsea Salas	[id number]	Insurance	[id number]  A	Y	O	11/14/2023	11/27/2023	morphine sulf er 60 mg tablet	90	30	Kelsea Salas	[id number]	Insurance	[id number]  A	N	O	10/17/2023	10/25/2023	morphine sulf er 60 mg tablet	90	30	Kelsea Salas	[id number]	Insurance	[id number]  A	N	B	10/17/2023	10/20/2023	lorazepam 1 mg tablet	60	30	Kelsea Salas	[id number]	Insurance	[id number]  A	N	O	09/07/2023	09/25/2023	morphine sulf er 60 mg tablet	90	30	Murtaza Forbes MD	[id number]	Insurance	[id number]  A	N	O	08/10/2023	08/23/2023	morphine sulf er 60 mg tablet	90	30	Murtaza Forbes MD	[id number]	Insurance	[id number]  A	N	O	07/11/2023	07/24/2023	morphine sulf er 60 mg tablet	90	30	Murtaza Forbes MD	[id number]	Insurance	[id number]  A	N	B	07/11/2023	07/13/2023	lorazepam 1 mg tablet	60	30	Murtaza Forbes MD	[id number]	Insurance	[id number]  A	N	O	06/13/2023	06/22/2023	morphine sulf er 60 mg tablet	90	30	Murtaza Forbes MD	[id number]	Insurance	[id number]  A	N	O	05/04/2023	05/24/2023	morphine sulf er 60 mg tablet	30	30	Sharon Dickson	[id number]	Insurance	[id number]  A	N	O	05/04/2023	05/21/2023	morphine sulf er 60 mg tablet	60	20	Sharon Dickson	[id number]	Insurance	[id number]  A	N	B	05/04/2023	05/05/2023	lorazepam 1 mg tablet	60	30	Sharon Dickson	[id number]	Insurance	[id number]  A	N	O	04/06/2023	04/21/2023	morphine sulf er 60 mg tablet	90	30	Sharon Dickson	[id number]	Insurance	[id number]  A	N	O	03/09/2023	03/25/2023	morphine sulf er 60 mg tablet	75	30	Murtaza Forbes MD	[id number]	Insurance	[id number]  A	N	O	03/09/2023	03/22/2023	morphine sulf er 60 mg tablet	15	5	Murtaza Forbes MD	[id number]	Insurance	[id number]  A	N	O	02/07/2023	02/22/2023	fentanyl 75 mcg/hr patch	15	30	Sharon Dickson	[id number]	[facility]  A	N	O	02/14/2023	02/14/2023	morphine sulf er 60 mg tablet	28	9	Murtaza Forbes MD	[id number]	[facility]  A	N	O	02/07/2023	02/08/2023	hydrocodone-acetaminophen  mg tablet	60	30	Sharon Dickson	[id number]	[facility]  A	N	B	01/10/2023	01/15/2023	lorazepam 1 mg tablet	60	30	Sharon Dickson	[id number]	[facility]  A	N	O	01/05/2023	01/10/2023	fentanyl 75 mcg/hr patch	5	30	Murtaza Forbes MD	[id number]	[facility]  A	N	O	01/05/2023	01/09/2023	fentanyl 75 mcg/hr patch	10	20	Murtaza Forbes MD	[id number]	[facility]     ADVANCE DIRECTIVES:     [x ] Full Code [ ] DNR  MOLST  [ ]  Living Will  [ ]   DECISION MAKER(s):  [ ] Health Care Proxy(s)  [x ] Surrogate(s)  [ ] Guardian           Name(s): Phone Number(s): Rylan      BASELINE (I)ADL(s) (prior to admission):    [place]: [ ]Total  [ ] Moderate [ ]Dependent  Palliative Performance Status Version 2:         %    http://[facility].org/files/news/palliative_performance_scale_[email or web address].pdf    Allergies    No Known Allergies    Intolerances    MEDICATIONS  (STANDING):  amitriptyline 25 milliGRAM(s) Oral at bedtime  azithromycin  IVPB 500 milliGRAM(s) IV Intermittent every 24 hours  cefTRIAXone   IVPB 1000 milliGRAM(s) IV Intermittent every 24 hours  enoxaparin Injectable 40 milliGRAM(s) SubCutaneous every 24 hours  escitalopram 10 milliGRAM(s) Oral daily  oseltamivir 75 milliGRAM(s) Oral two times a day  pantoprazole   Suspension 40 milliGRAM(s) Oral daily  vancomycin  IVPB 500 milliGRAM(s) IV Intermittent every 12 hours    MEDICATIONS  (PRN):  acetaminophen     Tablet .. 975 milliGRAM(s) Oral every 8 hours PRN Temp greater or equal to 38.5C (101.3F)    PRESENT SYMPTOMS: [x ]Unable to obtain due to poor mentation  - patient able to shaked head yes/no and point  Source if other than patient:  [ ]Family   [ ]Team     Pain: [x ]yes [ ]no  QOL impact - unable to answer  Location -   throat                  Aggravating factors - unable to answer  Quality - unable to answer  Radiation - unable to answer  Timing- unable to answer  Severity (0-10 scale): unable to answer  Minimal acceptable level (0-10 scale):  unable to answer    CPOT:    https://www.[facility].org/getattachment/[email or web address]/Critical-Care-Pain-Observation-Tool-(CPOT)    PAIN AD Score:   http://geriatrictoolkit.[facility]/cog/painad.pdf (press ctrl +  left click to view)    Dyspnea:                           [ ]None[x ]Mild [ ]Moderate [ ]Severe     Respiratory Distress Observation Scale (RDOS):   A score of 0 to 2 signifies little or no respiratory distress, 3 signifies mild distress, scores 4 to 6 indicate moderate distress, and scores greater than 7 signify severe distress  https://www.[facility].ca/sites/default/files/PDFS/[email or web address]-sbwnlxfccpz-dklwkuzu-mupzwvuqrgg-snjae.pdf    Anxiety:                             [ ]None[ ]Mild [ ]Moderate [ ]Severe   Fatigue:                             [ ]None[ ]Mild [ ]Moderate [ ]Severe   Nausea:                             [ ]None[ ]Mild [ ]Moderate [ ]Severe   Loss of appetite:              [ ]None[ ]Mild [ ]Moderate [ ]Severe   Constipation:                    [ ]None[ ]Mild [ ]Moderate [ ]Severe    Other Symptoms:  [x ]All other review of systems negative     Palliative Performance Status Version 2:         20%    http://[facility].org/files/news/palliative_performance_scale_ppsv2.pdf    PHYSICAL EXAM:  Vital Signs Last 24 Hrs  T(C): 37.7 (20 Dec 2023 12:00), Max: 39.3 (20 Dec 2023 04:00)  T(F): 99.8 (20 Dec 2023 12:00), Max: 102.7 (20 Dec 2023 04:00)  HR: 99 (20 Dec 2023 15:00) (92 - 120)  BP: 170/88 (20 Dec 2023 15:00) (109/55 - 240/144)  BP(mean): 121 (20 Dec 2023 15:00) (77 - 133)  RR: 23 (20 Dec 2023 15:00) (14 - 31)  SpO2: 95% (20 Dec 2023 15:00) (95% - 100%)    Parameters below as of 20 Dec 2023 15:00  Patient On (Oxygen Delivery Method): nasal cannula  O2 Flow (L/min): 2   I&O's Summary    20 Dec 2023 07:01  -  20 Dec 2023 16:05  --------------------------------------------------------  IN: 500 mL / OUT: 1150 mL / NET: -650 mL    GENERAL:  [ ] No acute distress [x ]Lethargic  [ ]Unarousable  [ ]Verbal  [ x]Non-Verbal [ ]Cachexia    BEHAVIORAL/PSYCH:  [ ]Alert and Oriented x  [ ] Anxiety [ ] Delirium [ ] Agitation [ x] Calm   EYES: [x ] No scleral icterus [ ] Scleral icterus [ ] Closed  ENMT:  [ ]Dry mouth  [x ]No external oral lesions [ ] No external ear or nose lesions  CARDIOVASCULAR:  [ ]Regular [ ]Irregular [x ]Tachy [ ]Not Tachy  [ ]Lobo [ ] Edema [ ] No edema  PULMONARY:  [ ]Tachypnea  [ ]Audible excessive secretions [x ] No labored breathing [ ] labored breathing  GASTROINTESTINAL: [ ]Soft  [ ]Distended  [ x]Not distended [ ]Non tender [ ]Tender  MUSCULOSKELETAL: [ ]No clubbing [ ] clubbing  [x ] No cyanosis [ ] cyanosis  NEUROLOGIC: [ ]No focal deficits  [ x]Follows commands  [ ]Does not follow commands  [ ]Cognitive impairment  [ ]Dysphagia  [ ]Dysarthria  [ ]Paresis   SKIN: [ ] Jaundiced [x ] Non-jaundiced [ ]Rash [ ]No Rash [ ] Warm [ ] Dry  MISC/LINES: [x ] ET tube [ ] Trach [ ]NGT/OGT [ ]PEG [ ]Baker    LABS: reviewed by me                        10.9   10.50 )-----------( 172      ( 20 Dec 2023 07:19 )             33.5   12-20    133<L>  |  97<L>  |  15  ----------------------------<  121<H>  3.8   |  24  |  1.0    Ca    7.7<L>      20 Dec 2023 07:19  Phos  1.7     12-20  Mg     1.4     12-20    TPro  5.7<L>  /  Alb  3.1<L>  /  TBili  <0.2  /  DBili  x   /  AST  29  /  ALT  55<H>  /  AlkPhos  105  12-20  PT/INR - ( 19 Dec 2023 22:17 )   PT: 11.10 sec;   INR: 0.97 ratio         PTT - ( 19 Dec 2023 22:17 )  PTT:25.6 sec    Urinalysis Basic - ( 20 Dec 2023 07:19 )    Color: x / Appearance: x / SG: x / pH: x  Gluc: 121 mg/dL / Ketone: x  / Bili: x / Urobili: x   Blood: x / Protein: x / Nitrite: x   Leuk Esterase: x / RBC: x / WBC x   Sq Epi: x / Non Sq Epi: x / Bacteria: x      RADIOLOGY & ADDITIONAL STUDIES: reviewed by me    < from: Xray Chest 1 View-PORTABLE IMMEDIATE (Xray Chest 1 View-PORTABLE IMMEDIATE .) (12.20.23 @ 11:57) >  Findings/  impression:    Support devices: Endotracheal tube in satisfactory position. Feeding tube   coursing into abdomen.    Cardiac/mediastinum/hilum: Unchanged    Lung parenchyma/Pleura: Persistent bibasilaropacities. No pneumothorax.    Skeleton/soft tissues: Unchanged    < end of copied text >      EKG:     No recent EKG    PROTEIN CALORIE MALNUTRITION PRESENT: [ ]mild [ ]moderate [ ]severe [ ]underweight [ ]morbid obesity  https://www.andeal.org/vault/2440/web/files/ONC/Table_Clinical%20Characteristics%20to%20Document%20Malnutrition-White%20JV%20et%20al%202012.pdf    Height (cm): 160 (12-20-23 @ 10:00)  Weight (kg): 55.6 (12-20-23 @ 10:00)  BMI (kg/m2): 21.7 (12-20-23 @ 10:00)  [ ]PPSV2 < or = to 30% [ ]significant weight loss  [ ]poor nutritional intake  [ ]anasarca      [ ]Artificial Nutrition      Palliative Care Spiritual/Emotional Screening Tool Question  Severity (0-4):                    OR                    [ x] Unable to determine. Will assess at later time if appropriate.  Score of 2 or greater indicates recommendation of Chaplaincy and/or SW referral  Chaplaincy Referral: [ ] Yes [ ] Refused [ ] Following     Caregiver [place]:  [ ] Yes [ ] No    OR    [x ] Unable to determine. Will assess at later time if appropriate.  Social Work Referral [ ]  Patient and Family Centered Care Referral [ ]    Anticipatory Grief Present: [ ] Yes [ ] No    OR     [ x] Unable to determine. Will assess at later time if appropriate.  Social Work Referral [ ]  Patient and Family Centered Care Referral [ ]    Patient discussed with primary medical team MD  Palliative care education provided to patient and/or family   CC: obtunded    HPI:  62 yo F with PMHx of anxiety/MDD, Hypothyroidism and fibromyalgia BIBEMS to the hospital for AMS. Family called EMS because they found patient obtunded and altered at home today, they were concerned that patient may have overdosed on her sleeping pills. Family do describe that for the past 2 days patient has been having a cough, yesterday evening she started becoming progressively lethargic and thus he called EMS. Son denies any fever chills, seizure like activity fall or trauma while at home. Further history unable to be assessed at this moment.     Upon arrival a code stroke was called for AMS with CTA head neg for large vessel occlusion. In the ED patient was hypertensive and hypoxemic. The decision to intubate her was done for protection of airways. Patient admitted to ICU for AHRF 2/2 bilateral pneumonia       Vital Signs Last 24 Hrs  T(C): --  T(F): --  HR: 102 (19 Dec 2023 21:52) (102 - 102)  BP: 208/98 (19 Dec 2023 21:52) (208/98 - 208/98)  BP(mean): --  RR: 18 (19 Dec 2023 21:52) (18 - 18)  SpO2: 99% (19 Dec 2023 21:52) (99% - 99%)    Parameters below as of 19 Dec 2023 21:52  Patient On (Oxygen Delivery Method): nasal cannula  O2 Flow (L/min): 3   (19 Dec 2023 23:17)    PERTINENT PM/SXH:   Fibromyalgia    Eczema, unspecified type    Depression, unspecified depression type      Depression, acute    History of surgery    History of surgery      FAMILY HISTORY:  no pertinent history    ITEMS NOT CHECKED ARE NOT PRESENT    SOCIAL HISTORY:   Significant other/partner[ ]  Children[ ]  Zoroastrian/Spirituality:  Substance hx:  [ ]   Tobacco hx:  [ ]   Alcohol hx: [ ]   Living Situation: [ x]Home  [ ]Long term care  [ ]Rehab [ ]Other  Home Services: [ ] HHA [ ] Gracy RN [ ] Hospice  Occupation:  Home Opioid hx:  [ ] Y [ ] N [x ] I-Stop Reference No:    Reference #: [id number]      Practitioner Count: 2  Pharmacy Count: 1  Current Opioid Prescriptions: 1  Current Benzodiazepine Prescriptions: 1  Current Stimulant Prescriptions: 0      Patient Demographic Information (PDI)       PDI	First Name	Last Name	Birth Date	Gender	Street Address	City	State	Zip Code  SHILOH Acevedo	1960	Female	[address] [facility]	[address]    Prescription Information      PDI Filter:    PDI	Current Rx	Drug Type	Rx Written	Rx Dispensed	Drug	Quantity	Days Supply	Prescriber Name	Prescriber TENA #	Payment Method	Dispenser  A	Y	B	12/12/2023	12/13/2023	lorazepam 1 mg tablet	60	30	Kelsea Salas	[id number]	Insurance	[id number]  A	Y	O	11/14/2023	11/27/2023	morphine sulf er 60 mg tablet	90	30	Kelsea Salas	[id number]	Insurance	[id number]  A	N	O	10/17/2023	10/25/2023	morphine sulf er 60 mg tablet	90	30	Kelsea Salas	[id number]	Insurance	[id number]  A	N	B	10/17/2023	10/20/2023	lorazepam 1 mg tablet	60	30	Kelsea Salas	[id number]	Insurance	[facility]  A	N	O	09/07/2023	09/25/2023	morphine sulf er 60 mg tablet	90	30	Murtaza Forbes MD	[id number]	Insurance	[id number]  A	N	O	08/10/2023	08/23/2023	morphine sulf er 60 mg tablet	90	30	Murtaza Forbes MD	[id number]	Insurance	[facility]  A	N	O	07/11/2023	07/24/2023	morphine sulf er 60 mg tablet	90	30	Murtaza Forbes MD	[id number]	Insurance	[id number]  A	N	B	07/11/2023	07/13/2023	lorazepam 1 mg tablet	60	30	Murtaza Forbes MD	[id number]	Insurance	[facility]  A	N	O	06/13/2023	06/22/2023	morphine sulf er 60 mg tablet	90	30	Murtaza Forbes MD	[id number]	Insurance	[facility]  A	N	O	05/04/2023	05/24/2023	morphine sulf er 60 mg tablet	30	30	Sharon Dickson	[id number]	Insurance	[facility]  A	N	O	05/04/2023	05/21/2023	morphine sulf er 60 mg tablet	60	20	Sharon Dickson	[id number]	Insurance	[facility]  A	N	B	05/04/2023	05/05/2023	lorazepam 1 mg tablet	60	30	Sharon Dickson	[id number]	Insurance	[id number]  A	N	O	04/06/2023	04/21/2023	morphine sulf er 60 mg tablet	90	30	Sharon Dickson	[id number]	Insurance	[id number]  A	N	O	03/09/2023	03/25/2023	morphine sulf er 60 mg tablet	75	30	Murtaza Forbes MD	[id number]	Insurance	[id number]  A	N	O	03/09/2023	03/22/2023	morphine sulf er 60 mg tablet	15	5	Murtaza Forbes MD	[id number]	Insurance	[id number]  A	N	O	02/07/2023	02/22/2023	fentanyl 75 mcg/hr patch	15	30	Sharon Dickson	[id number]	[id number]  A	N	O	02/14/2023	02/14/2023	morphine sulf er 60 mg tablet	28	9	Murtaza Forbes MD	[id number]	[facility]  A	N	O	02/07/2023	02/08/2023	hydrocodone-acetaminophen  mg tablet	60	30	Sharon Dickson	[id number]	[facility]  A	N	B	01/10/2023	01/15/2023	lorazepam 1 mg tablet	60	30	Sharon Dickson	[id number]	[facility]  A	N	O	01/05/2023	01/10/2023	fentanyl 75 mcg/hr patch	5	30	Murtaza Forbes MD	[id number]	[facility]  A	N	O	01/05/2023	01/09/2023	fentanyl 75 mcg/hr patch	10	20	Murtaza Forbes MD	[id number]	[id number]     ADVANCE DIRECTIVES:     [x ] Full Code [ ] DNR  MOLST  [ ]  Living Will  [ ]   DECISION MAKER(s):  [ ] Health Care Proxy(s)  [x ] Surrogate(s)  [ ] Guardian           Name(s): Phone Number(s): Rylan      BASELINE (I)ADL(s) (prior to admission):    [place]: [ ]Total  [ ] Moderate [ ]Dependent  Palliative Performance Status Version 2:         %    http://[facility].org/files/news/palliative_performance_scale_[email or web address].pdf    Allergies    No Known Allergies    Intolerances    MEDICATIONS  (STANDING):  amitriptyline 25 milliGRAM(s) Oral at bedtime  azithromycin  IVPB 500 milliGRAM(s) IV Intermittent every 24 hours  cefTRIAXone   IVPB 1000 milliGRAM(s) IV Intermittent every 24 hours  enoxaparin Injectable 40 milliGRAM(s) SubCutaneous every 24 hours  escitalopram 10 milliGRAM(s) Oral daily  oseltamivir 75 milliGRAM(s) Oral two times a day  pantoprazole   Suspension 40 milliGRAM(s) Oral daily  vancomycin  IVPB 500 milliGRAM(s) IV Intermittent every 12 hours    MEDICATIONS  (PRN):  acetaminophen     Tablet .. 975 milliGRAM(s) Oral every 8 hours PRN Temp greater or equal to 38.5C (101.3F)    PRESENT SYMPTOMS: [x ]Unable to obtain due to poor mentation  - patient able to shaked head yes/no and point  Source if other than patient:  [ ]Family   [ ]Team     Pain: [x ]yes [ ]no  QOL impact - unable to answer  Location -   throat                  Aggravating factors - unable to answer  Quality - unable to answer  Radiation - unable to answer  Timing- unable to answer  Severity (0-10 scale): unable to answer  Minimal acceptable level (0-10 scale):  unable to answer    CPOT:    https://www.[facility].org/getattachment/[email or web address]/Critical-Care-Pain-Observation-Tool-(CPOT)    PAIN AD Score:   http://geriatrictoolkit.[facility]/cog/painad.pdf (press ctrl +  left click to view)    Dyspnea:                           [ ]None[x ]Mild [ ]Moderate [ ]Severe     Respiratory Distress Observation Scale (RDOS):   A score of 0 to 2 signifies little or no respiratory distress, 3 signifies mild distress, scores 4 to 6 indicate moderate distress, and scores greater than 7 signify severe distress  https://www.[facility].ca/sites/default/files/PDFS/392612-rggficwybks-wqmxfruj-cscskplfcoc-jeqdp.pdf    Anxiety:                             [ ]None[ ]Mild [ ]Moderate [ ]Severe   Fatigue:                             [ ]None[ ]Mild [ ]Moderate [ ]Severe   Nausea:                             [ ]None[ ]Mild [ ]Moderate [ ]Severe   Loss of appetite:              [ ]None[ ]Mild [ ]Moderate [ ]Severe   Constipation:                    [ ]None[ ]Mild [ ]Moderate [ ]Severe    Other Symptoms:  [x ]All other review of systems negative     Palliative Performance Status Version 2:         20%    http://[facility].org/files/news/palliative_performance_scale_ppsv2.pdf    PHYSICAL EXAM:  Vital Signs Last 24 Hrs  T(C): 37.7 (20 Dec 2023 12:00), Max: 39.3 (20 Dec 2023 04:00)  T(F): 99.8 (20 Dec 2023 12:00), Max: 102.7 (20 Dec 2023 04:00)  HR: 99 (20 Dec 2023 15:00) (92 - 120)  BP: 170/88 (20 Dec 2023 15:00) (109/55 - 240/144)  BP(mean): 121 (20 Dec 2023 15:00) (77 - 133)  RR: 23 (20 Dec 2023 15:00) (14 - 31)  SpO2: 95% (20 Dec 2023 15:00) (95% - 100%)    Parameters below as of 20 Dec 2023 15:00  Patient On (Oxygen Delivery Method): nasal cannula  O2 Flow (L/min): 2   I&O's Summary    20 Dec 2023 07:01  -  20 Dec 2023 16:05  --------------------------------------------------------  IN: 500 mL / OUT: 1150 mL / NET: -650 mL    GENERAL:  [ ] No acute distress [x ]Lethargic  [ ]Unarousable  [ ]Verbal  [ x]Non-Verbal [ ]Cachexia    BEHAVIORAL/PSYCH:  [ ]Alert and Oriented x  [ ] Anxiety [ ] Delirium [ ] Agitation [ x] Calm   EYES: [x ] No scleral icterus [ ] Scleral icterus [ ] Closed  ENMT:  [ ]Dry mouth  [x ]No external oral lesions [ ] No external ear or nose lesions  CARDIOVASCULAR:  [ ]Regular [ ]Irregular [x ]Tachy [ ]Not Tachy  [ ]Lobo [ ] Edema [ ] No edema  PULMONARY:  [ ]Tachypnea  [ ]Audible excessive secretions [x ] No labored breathing [ ] labored breathing  GASTROINTESTINAL: [ ]Soft  [ ]Distended  [ x]Not distended [ ]Non tender [ ]Tender  MUSCULOSKELETAL: [ ]No clubbing [ ] clubbing  [x ] No cyanosis [ ] cyanosis  NEUROLOGIC: [ ]No focal deficits  [ x]Follows commands  [ ]Does not follow commands  [ ]Cognitive impairment  [ ]Dysphagia  [ ]Dysarthria  [ ]Paresis   SKIN: [ ] Jaundiced [x ] Non-jaundiced [ ]Rash [ ]No Rash [ ] Warm [ ] Dry  MISC/LINES: [x ] ET tube [ ] Trach [ ]NGT/OGT [ ]PEG [ ]Baker    LABS: reviewed by me                        10.9   10.50 )-----------( 172      ( 20 Dec 2023 07:19 )             33.5   12-20    133<L>  |  97<L>  |  15  ----------------------------<  121<H>  3.8   |  24  |  1.0    Ca    7.7<L>      20 Dec 2023 07:19  Phos  1.7     12-20  Mg     1.4     12-20    TPro  5.7<L>  /  Alb  3.1<L>  /  TBili  <0.2  /  DBili  x   /  AST  29  /  ALT  55<H>  /  AlkPhos  105  12-20  PT/INR - ( 19 Dec 2023 22:17 )   PT: 11.10 sec;   INR: 0.97 ratio         PTT - ( 19 Dec 2023 22:17 )  PTT:25.6 sec    Urinalysis Basic - ( 20 Dec 2023 07:19 )    Color: x / Appearance: x / SG: x / pH: x  Gluc: 121 mg/dL / Ketone: x  / Bili: x / Urobili: x   Blood: x / Protein: x / Nitrite: x   Leuk Esterase: x / RBC: x / WBC x   Sq Epi: x / Non Sq Epi: x / Bacteria: x      RADIOLOGY & ADDITIONAL STUDIES: reviewed by me    < from: Xray Chest 1 View-PORTABLE IMMEDIATE (Xray Chest 1 View-PORTABLE IMMEDIATE .) (12.20.23 @ 11:57) >  Findings/  impression:    Support devices: Endotracheal tube in satisfactory position. Feeding tube   coursing into abdomen.    Cardiac/mediastinum/hilum: Unchanged    Lung parenchyma/Pleura: Persistent bibasilaropacities. No pneumothorax.    Skeleton/soft tissues: Unchanged    < end of copied text >      EKG:     No recent EKG    PROTEIN CALORIE MALNUTRITION PRESENT: [ ]mild [ ]moderate [ ]severe [ ]underweight [ ]morbid obesity  [email or web address]    Height (cm): 160 (12-20-23 @ 10:00)  Weight (kg): 55.6 (12-20-23 @ 10:00)  BMI (kg/m2): 21.7 (12-20-23 @ 10:00)  [ ]PPSV2 < or = to 30% [ ]significant weight loss  [ ]poor nutritional intake  [ ]anasarca      [ ]Artificial Nutrition      Palliative Care Spiritual/Emotional Screening Tool Question  Severity (0-4):                    OR                    [ x] Unable to determine. Will assess at later time if appropriate.  Score of 2 or greater indicates recommendation of Chaplaincy and/or SW referral  Chaplaincy Referral: [ ] Yes [ ] Refused [ ] Following     Caregiver [place]:  [ ] Yes [ ] No    OR    [x ] Unable to determine. Will assess at later time if appropriate.  Social Work Referral [ ]  Patient and Family Centered Care Referral [ ]    Anticipatory Grief Present: [ ] Yes [ ] No    OR     [ x] Unable to determine. Will assess at later time if appropriate.  Social Work Referral [ ]  Patient and Family Centered Care Referral [ ]    Patient discussed with primary medical team MD  Palliative care education provided to patient and/or family

## 2023-12-20 NOTE — PATIENT PROFILE ADULT - FUNCTIONAL SCREEN CURRENT LEVEL: SWALLOWING (IF SCORE 2 OR MORE FOR ANY ITEM, CONSULT REHAB SERVICES), MLM)
Left message on machine that the Rx refill request was sent to the pharmacy.    2 = difficulty swallowing liquids/foods

## 2023-12-20 NOTE — PROGRESS NOTE ADULT - ASSESSMENT
IMPRESSION:    Acute Hypoxemic Respiratory Failure SP Intubation 12/19  Influenza A Pneumonia   Possible superimposed bacterial infection   Toxic Metabolic Encephalopathy   HO Fibromyalgia   HO Hypothyroidism   HO Anxiety       PLAN:    CNS: SAT, CTH unremarkable, f/u drug/urine toxicology, Neuroendovascular eval for left cerebellar venous anomaly     HEENT: Oral care.  ET care     PULMONARY:  HOB @ 45 degrees.  Aspiration precautions. SBT, monitor Pplat/Ppeak/DP  DTA     CARDIOVASCULAR: 2D-Echo, not on pressors, troponin - x 1    GI: GI prophylaxis.  Hold feeds for SBT.  Bowel regimen     RENAL:  Follow up lytes.  Correct as needed    INFECTIOUS DISEASE: Follow up cultures, Tamiflu, Ceftriaxone, Vancomycin, and azithromycin.  f/u procalcitonin, nasal MRSA, urine legionella and strep Ag    HEMATOLOGICAL:  DVT prophylaxis.  Dimer     ENDOCRINE:  Follow up FS.  Insulin protocol if needed.    MUSCULOSKELETAL: bedrest.  off loading     Goals of care    MICU monitoring

## 2023-12-20 NOTE — CONSULT NOTE ADULT - SUBJECTIVE AND OBJECTIVE BOX
Neuroendovascular Consult note:   Consulted for: left cerebellar venous anomaly     HPI:  Patient is a 63 year old female history anxiety/MDD, hypothyroidism, fibromyalgia, presented for AMS and lethargy. Stroke code was activated, NIHSS 21. CTH negative, CTA reporting developmental venous anomaly in the left cerebellum. Patient was placed on vEEG, intubated for airway protection, now on sedation in ICU. Admitted for toxic metabolic encephalopathy v seizure w/u to MICU. Flu+  Neuroendovascular team consulted for evaluation of CTA + finding of developmental venous anomaly of left cerebellum.       Past medical history:   Fibromyalgia    Eczema, unspecified type    Depression, unspecified depression type        Medications:  amitriptyline 25 milliGRAM(s) Oral daily  azithromycin  IVPB 500 milliGRAM(s) IV Intermittent every 24 hours  cefTRIAXone   IVPB 1000 milliGRAM(s) IV Intermittent every 24 hours  chlorhexidine 0.12% Liquid 15 milliLiter(s) Oral Mucosa every 12 hours  enoxaparin Injectable 40 milliGRAM(s) SubCutaneous every 24 hours  escitalopram 10 milliGRAM(s) Oral daily  magnesium sulfate  IVPB 2 Gram(s) IV Intermittent every 2 hours  oseltamivir 75 milliGRAM(s) Oral two times a day  pantoprazole    Tablet 40 milliGRAM(s) Oral before breakfast  potassium phosphate IVPB 15 milliMole(s) IV Intermittent once  propofol Infusion 20 MICROgram(s)/kG/Min IV Continuous <Continuous>  vancomycin  IVPB 750 milliGRAM(s) IV Intermittent every 12 hours      Vitals:   T(F): 100.9 (12-20-23 @ 07:30), Max: 102.7 (12-20-23 @ 04:00)  HR: 106 (12-20-23 @ 10:00) (92 - 120)  BP: 150/67 (12-20-23 @ 10:00) (109/55 - 240/144)  RR: 21 (12-20-23 @ 10:00) (14 - 21)  SpO2: 98% (12-20-23 @ 10:00) (98% - 100%)    Labs:                         10.9   10.50 )-----------( 172      ( 20 Dec 2023 07:19 )             33.5     12-20    133<L>  |  97<L>  |  15  ----------------------------<  121<H>  3.8   |  24  |  1.0    Ca    7.7<L>      20 Dec 2023 07:19  Phos  1.7     12-20  Mg     1.4     12-20    TPro  5.7<L>  /  Alb  3.1<L>  /  TBili  <0.2  /  DBili  x   /  AST  29  /  ALT  55<H>  /  AlkPhos  105  12-20    PT/INR - ( 19 Dec 2023 22:17 )   PT: 11.10 sec;   INR: 0.97 ratio         PTT - ( 19 Dec 2023 22:17 )  PTT:25.6 sec  LIVER FUNCTIONS - ( 20 Dec 2023 07:19 )  Alb: 3.1 g/dL / Pro: 5.7 g/dL / ALK PHOS: 105 U/L / ALT: 55 U/L / AST: 29 U/L / GGT: x             Exam:   General - NAD, intubated   Neuro - on propofol, no EO to voice or noxious. Gaze midline, PERRL, withdraws all 4 extremities to noxious.     Radiology:   CT< from: CT Angio Neck Stroke Protocol w/ IV Cont (12.19.23 @ 22:44) >  IMPRESSION:    1.  Utilizing a CBF < 30% and Tmax > 6 seconds, no evidence of core   infarct or hypoperfusion.  2.  No intracranial large vessel occlusion or aneurysm.  3.  Left cerebellar developmental venous anomaly.  4.  Thyroid hypodensities can be further evaluated with nonemergent   outpatient thyroid sonogram.      < end of copied text >  < from: CT Brain Stroke Protocol (12.19.23 @ 22:28) >    IMPRESSION:    No evidence of acute intracranial pathology.    Preliminary findings discussed with Justus Rodriguez on 12/19/2023 10:28 PM.    --- End of Report ---    < end of copied text >      Assessment:   Patient is a 63 year old female history anxiety/MDD, hypothyroidism, fibromyalgia, presented for AMS and lethargy. Stroke code was activated, NIHSS 21. CTH negative, CTA reporting developmental venous anomaly in the left cerebellum. Patient was placed on vEEG, intubated for airway protection, now on sedation in ICU. Admitted for toxic metabolic encephalopathy v seizure w/u to MICU. Flu+  Neuroendovascular team consulted for evaluation of CTA + finding of developmental venous anomaly of left cerebellum.     Suggestions:  MRI brain   Will consider for diagnostic cerebral angiogram once clinically improved   Continue management per MICU   Discussed with Dr Garcia.  [id number] Neuroendovascular Consult note:   Consulted for: left cerebellar venous anomaly     HPI:  Patient is a 63 year old female history anxiety/MDD, hypothyroidism, fibromyalgia, presented for AMS and lethargy. Stroke code was activated, NIHSS 21. CTH negative, CTA reporting developmental venous anomaly in the left cerebellum. Patient was placed on vEEG, intubated for airway protection, now on sedation in ICU. Admitted for toxic metabolic encephalopathy v seizure w/u to MICU. Flu+  Neuroendovascular team consulted for evaluation of CTA + finding of developmental venous anomaly of left cerebellum.       Past medical history:   Fibromyalgia    Eczema, unspecified type    Depression, unspecified depression type        Medications:  amitriptyline 25 milliGRAM(s) Oral daily  azithromycin  IVPB 500 milliGRAM(s) IV Intermittent every 24 hours  cefTRIAXone   IVPB 1000 milliGRAM(s) IV Intermittent every 24 hours  chlorhexidine 0.12% Liquid 15 milliLiter(s) Oral Mucosa every 12 hours  enoxaparin Injectable 40 milliGRAM(s) SubCutaneous every 24 hours  escitalopram 10 milliGRAM(s) Oral daily  magnesium sulfate  IVPB 2 Gram(s) IV Intermittent every 2 hours  oseltamivir 75 milliGRAM(s) Oral two times a day  pantoprazole    Tablet 40 milliGRAM(s) Oral before breakfast  potassium phosphate IVPB 15 milliMole(s) IV Intermittent once  propofol Infusion 20 MICROgram(s)/kG/Min IV Continuous <Continuous>  vancomycin  IVPB 750 milliGRAM(s) IV Intermittent every 12 hours      Vitals:   T(F): 100.9 (12-20-23 @ 07:30), Max: 102.7 (12-20-23 @ 04:00)  HR: 106 (12-20-23 @ 10:00) (92 - 120)  BP: 150/67 (12-20-23 @ 10:00) (109/55 - 240/144)  RR: 21 (12-20-23 @ 10:00) (14 - 21)  SpO2: 98% (12-20-23 @ 10:00) (98% - 100%)    Labs:                         10.9   10.50 )-----------( 172      ( 20 Dec 2023 07:19 )             33.5     12-20    133<L>  |  97<L>  |  15  ----------------------------<  121<H>  3.8   |  24  |  1.0    Ca    7.7<L>      20 Dec 2023 07:19  Phos  1.7     12-20  Mg     1.4     12-20    TPro  5.7<L>  /  Alb  3.1<L>  /  TBili  <0.2  /  DBili  x   /  AST  29  /  ALT  55<H>  /  AlkPhos  105  12-20    PT/INR - ( 19 Dec 2023 22:17 )   PT: 11.10 sec;   INR: 0.97 ratio         PTT - ( 19 Dec 2023 22:17 )  PTT:25.6 sec  LIVER FUNCTIONS - ( 20 Dec 2023 07:19 )  Alb: 3.1 g/dL / Pro: 5.7 g/dL / ALK PHOS: 105 U/L / ALT: 55 U/L / AST: 29 U/L / GGT: x             Exam:   General - NAD, intubated   Neuro - on propofol, no EO to voice or noxious. Gaze midline, PERRL, withdraws all 4 extremities to noxious.     Radiology:   CT< from: CT Angio Neck Stroke Protocol w/ IV Cont (12.19.23 @ 22:44) >  IMPRESSION:    1.  Utilizing a CBF < 30% and Tmax > 6 seconds, no evidence of core   infarct or hypoperfusion.  2.  No intracranial large vessel occlusion or aneurysm.  3.  Left cerebellar developmental venous anomaly.  4.  Thyroid hypodensities can be further evaluated with nonemergent   outpatient thyroid sonogram.      < end of copied text >  < from: CT Brain Stroke Protocol (12.19.23 @ 22:28) >    IMPRESSION:    No evidence of acute intracranial pathology.    Preliminary findings discussed with Justus Rodriguez on 12/19/2023 10:28 PM.    --- End of Report ---    < end of copied text >      Assessment:   Patient is a 63 year old female history anxiety/MDD, hypothyroidism, fibromyalgia, presented for AMS and lethargy. Stroke code was activated, NIHSS 21. CTH negative, CTA reporting developmental venous anomaly in the left cerebellum. Patient was placed on vEEG, intubated for airway protection, now on sedation in ICU. Admitted for toxic metabolic encephalopathy v seizure w/u to MICU. Flu+  Neuroendovascular team consulted for evaluation of CTA + finding of developmental venous anomaly of left cerebellum.     Suggestions:  MRI brain   Will consider for diagnostic cerebral angiogram once clinically improved   Continue management per MICU   Discussed with Dr Garcia.  [id number]

## 2023-12-20 NOTE — CONSULT NOTE ADULT - CONVERSATION DETAILS
Called and spoke with Rylan over the phone. Palliative care introduce.  He was able to provide a medical history and hospital course. He noted the patient has some medical issues, but she was functioning well prior to hospitalization.  I provided a medical update and noted the patient was now off the ventilator.  We discussed overall GOC and code status. He wishes for the patient to be Full code and be reintubated as needed.  All questions answered.

## 2023-12-20 NOTE — CONSULT NOTE ADULT - TIME BILLING
Time above includes time spent preparing to see the patient, obtaining and/or reviewing separately obtained history, performing a medically appropriate examination and/or evaluation, counseling and educating the patient/family/caregiver, referring and communicating with other health care professionals (when not separately reported), documenting clinical information in the electronic or other health record, independently interpreting results (not separately reported) and communicating results to the patient/family/caregiver, and/or care coordination (not separately reported).
Detail Level: Simple

## 2023-12-20 NOTE — CONSULT NOTE ADULT - PROBLEM SELECTOR RECOMMENDATION 2
-Full code  -ongoing medical management  -will follow for now Likely in setting of respiratory failure/flu above. Patient also with venous anamoly on imaging.  Patient's mental status improving per primary team.  -f/u MRI if done  -f/u neuro

## 2023-12-20 NOTE — PATIENT PROFILE ADULT - OVER THE PAST TWO WEEKS, HAVE YOU FELT LITTLE INTEREST OR PLEASURE IN DOING THINGS?
ANITA- pt intubated family not present information received from  (Judson)/yes information received from  (Judosn)/yes

## 2023-12-20 NOTE — PHARMACOTHERAPY INTERVENTION NOTE - COMMENTS
vancomycin 750mg IV q12h stat, pt received 1g @ 0920, d/w team, Scr 1, CrCl ~ 52.8, precise PK calc recommended changing vancomycin to 500mg IV q12h @ 2200

## 2023-12-20 NOTE — PROGRESS NOTE ADULT - ASSESSMENT
IMPRESSION:    Acute Hypoxemic Respiratory Failure SP Intubation 12/19  Influenza A Pneumonia   Toxic Metabolic Encephalopathy   HO Fibromyalgia   HO Hypothyroidism   HO Anxiety       PLAN:    CNS: SAT, CTH unremarkable, f/u drug/urine toxicology, Neuroendovascular eval for left cerebellar venous anomaly     HEENT: Oral care    PULMONARY:  HOB @ 45 degrees.  Aspiration precautions. SBT, monitor Pplat/Ppeak/DP    CARDIOVASCULAR: 2D-Echo, not on pressors, troponin - x 1    GI: GI prophylaxis.  Hold feeds for SBT.  Bowel regimen     RENAL:  Follow up lytes.  Correct as needed    INFECTIOUS DISEASE: Follow up cultures, Tamiflu, f/u procalcitonin, nasal MRSA, urine legionella and strep Ag    HEMATOLOGICAL:  DVT prophylaxis.    ENDOCRINE:  Follow up FS.  Insulin protocol if needed.    MUSCULOSKELETAL: bedrest    Goals of care    MICU monitoring

## 2023-12-20 NOTE — CONSULT NOTE ADULT - PROBLEM SELECTOR RECOMMENDATION 9
In the setting of AMS, influenza A and possible superimposed bacterial infection  -patient now medically extubated  -azithromycin  IVPB 500 milliGRAM(s) IV Intermittent every 24 hours  -cefTRIAXone   IVPB 1000 milliGRAM(s) IV Intermittent every 24 hours  -oseltamivir 75 milliGRAM(s) Oral two times a day  -vancomycin  IVPB 500 milliGRAM(s) IV Intermittent every 12 hours  -patient full code per discussion with   -will discuss further with patient as tolerated tomorrow

## 2023-12-21 LAB
6-ACETYLMORPHINE, UR RESULT: NEGATIVE NG/ML — SIGNIFICANT CHANGE UP
6-ACETYLMORPHINE, UR RESULT: NEGATIVE NG/ML — SIGNIFICANT CHANGE UP
6MAM UR CFM-MCNC: NEGATIVE NG/ML — SIGNIFICANT CHANGE UP
6MAM UR CFM-MCNC: NEGATIVE NG/ML — SIGNIFICANT CHANGE UP
A1C WITH ESTIMATED AVERAGE GLUCOSE RESULT: 6.9 % — HIGH (ref 4–5.6)
A1C WITH ESTIMATED AVERAGE GLUCOSE RESULT: 6.9 % — HIGH (ref 4–5.6)
AMMONIA BLD-MCNC: 38 UMOL/L — SIGNIFICANT CHANGE UP (ref 11–55)
AMMONIA BLD-MCNC: 38 UMOL/L — SIGNIFICANT CHANGE UP (ref 11–55)
ANION GAP SERPL CALC-SCNC: 12 MMOL/L — SIGNIFICANT CHANGE UP (ref 7–14)
ANION GAP SERPL CALC-SCNC: 12 MMOL/L — SIGNIFICANT CHANGE UP (ref 7–14)
BASOPHILS # BLD AUTO: 0.05 K/UL — SIGNIFICANT CHANGE UP (ref 0–0.2)
BASOPHILS # BLD AUTO: 0.05 K/UL — SIGNIFICANT CHANGE UP (ref 0–0.2)
BASOPHILS NFR BLD AUTO: 0.3 % — SIGNIFICANT CHANGE UP (ref 0–1)
BASOPHILS NFR BLD AUTO: 0.3 % — SIGNIFICANT CHANGE UP (ref 0–1)
BUN SERPL-MCNC: 11 MG/DL — SIGNIFICANT CHANGE UP (ref 10–20)
BUN SERPL-MCNC: 11 MG/DL — SIGNIFICANT CHANGE UP (ref 10–20)
CALCIUM SERPL-MCNC: 8.3 MG/DL — LOW (ref 8.4–10.4)
CALCIUM SERPL-MCNC: 8.3 MG/DL — LOW (ref 8.4–10.4)
CHLORIDE SERPL-SCNC: 101 MMOL/L — SIGNIFICANT CHANGE UP (ref 98–110)
CHLORIDE SERPL-SCNC: 101 MMOL/L — SIGNIFICANT CHANGE UP (ref 98–110)
CO2 SERPL-SCNC: 27 MMOL/L — SIGNIFICANT CHANGE UP (ref 17–32)
CO2 SERPL-SCNC: 27 MMOL/L — SIGNIFICANT CHANGE UP (ref 17–32)
CODEINE UR CFM-MCNC: NEGATIVE NG/ML — SIGNIFICANT CHANGE UP
CODEINE UR CFM-MCNC: NEGATIVE NG/ML — SIGNIFICANT CHANGE UP
CODEINE, UR RESULT: NEGATIVE NG/ML — SIGNIFICANT CHANGE UP
CODEINE, UR RESULT: NEGATIVE NG/ML — SIGNIFICANT CHANGE UP
CREAT SERPL-MCNC: 0.9 MG/DL — SIGNIFICANT CHANGE UP (ref 0.7–1.5)
CREAT SERPL-MCNC: 0.9 MG/DL — SIGNIFICANT CHANGE UP (ref 0.7–1.5)
CULTURE RESULTS: NO GROWTH — SIGNIFICANT CHANGE UP
CULTURE RESULTS: NO GROWTH — SIGNIFICANT CHANGE UP
DIHYDROCODONE RESULT: NEGATIVE NG/ML — SIGNIFICANT CHANGE UP
DIHYDROCODONE RESULT: NEGATIVE NG/ML — SIGNIFICANT CHANGE UP
EGFR: 72 ML/MIN/1.73M2 — SIGNIFICANT CHANGE UP
EGFR: 72 ML/MIN/1.73M2 — SIGNIFICANT CHANGE UP
EOSINOPHIL # BLD AUTO: 0.02 K/UL — SIGNIFICANT CHANGE UP (ref 0–0.7)
EOSINOPHIL # BLD AUTO: 0.02 K/UL — SIGNIFICANT CHANGE UP (ref 0–0.7)
EOSINOPHIL NFR BLD AUTO: 0.1 % — SIGNIFICANT CHANGE UP (ref 0–8)
EOSINOPHIL NFR BLD AUTO: 0.1 % — SIGNIFICANT CHANGE UP (ref 0–8)
ESTIMATED AVERAGE GLUCOSE: 151 MG/DL — HIGH (ref 68–114)
ESTIMATED AVERAGE GLUCOSE: 151 MG/DL — HIGH (ref 68–114)
GLUCOSE SERPL-MCNC: 104 MG/DL — HIGH (ref 70–99)
GLUCOSE SERPL-MCNC: 104 MG/DL — HIGH (ref 70–99)
GRAM STN FLD: ABNORMAL
GRAM STN FLD: ABNORMAL
HCT VFR BLD CALC: 39.4 % — SIGNIFICANT CHANGE UP (ref 37–47)
HCT VFR BLD CALC: 39.4 % — SIGNIFICANT CHANGE UP (ref 37–47)
HGB BLD-MCNC: 12.8 G/DL — SIGNIFICANT CHANGE UP (ref 12–16)
HGB BLD-MCNC: 12.8 G/DL — SIGNIFICANT CHANGE UP (ref 12–16)
HYDROCODONE UR QL CFM: NEGATIVE NG/ML — SIGNIFICANT CHANGE UP
HYDROCODONE UR QL CFM: NEGATIVE NG/ML — SIGNIFICANT CHANGE UP
HYDROCODONE, UR RESULT: NEGATIVE NG/ML — SIGNIFICANT CHANGE UP
HYDROCODONE, UR RESULT: NEGATIVE NG/ML — SIGNIFICANT CHANGE UP
HYDROMORPHONE UR QL CFM: NEGATIVE NG/ML — SIGNIFICANT CHANGE UP
HYDROMORPHONE UR QL CFM: NEGATIVE NG/ML — SIGNIFICANT CHANGE UP
HYDROMORPHONE, UR RESULT: NEGATIVE NG/ML — SIGNIFICANT CHANGE UP
HYDROMORPHONE, UR RESULT: NEGATIVE NG/ML — SIGNIFICANT CHANGE UP
IMM GRANULOCYTES NFR BLD AUTO: 1.1 % — HIGH (ref 0.1–0.3)
IMM GRANULOCYTES NFR BLD AUTO: 1.1 % — HIGH (ref 0.1–0.3)
LACTATE SERPL-SCNC: 1.5 MMOL/L — SIGNIFICANT CHANGE UP (ref 0.7–2)
LACTATE SERPL-SCNC: 1.5 MMOL/L — SIGNIFICANT CHANGE UP (ref 0.7–2)
LEGIONELLA AG UR QL: NEGATIVE — SIGNIFICANT CHANGE UP
LEGIONELLA AG UR QL: NEGATIVE — SIGNIFICANT CHANGE UP
LYMPHOCYTES # BLD AUTO: 1.36 K/UL — SIGNIFICANT CHANGE UP (ref 1.2–3.4)
LYMPHOCYTES # BLD AUTO: 1.36 K/UL — SIGNIFICANT CHANGE UP (ref 1.2–3.4)
LYMPHOCYTES # BLD AUTO: 8.3 % — LOW (ref 20.5–51.1)
LYMPHOCYTES # BLD AUTO: 8.3 % — LOW (ref 20.5–51.1)
MAGNESIUM SERPL-MCNC: 2.9 MG/DL — HIGH (ref 1.8–2.4)
MAGNESIUM SERPL-MCNC: 2.9 MG/DL — HIGH (ref 1.8–2.4)
MCHC RBC-ENTMCNC: 26.6 PG — LOW (ref 27–31)
MCHC RBC-ENTMCNC: 26.6 PG — LOW (ref 27–31)
MCHC RBC-ENTMCNC: 32.5 G/DL — SIGNIFICANT CHANGE UP (ref 32–37)
MCHC RBC-ENTMCNC: 32.5 G/DL — SIGNIFICANT CHANGE UP (ref 32–37)
MCV RBC AUTO: 81.7 FL — SIGNIFICANT CHANGE UP (ref 81–99)
MCV RBC AUTO: 81.7 FL — SIGNIFICANT CHANGE UP (ref 81–99)
MEPERIDINE RESULT: NEGATIVE NG/ML — SIGNIFICANT CHANGE UP
MEPERIDINE RESULT: NEGATIVE NG/ML — SIGNIFICANT CHANGE UP
MONOCYTES # BLD AUTO: 0.55 K/UL — SIGNIFICANT CHANGE UP (ref 0.1–0.6)
MONOCYTES # BLD AUTO: 0.55 K/UL — SIGNIFICANT CHANGE UP (ref 0.1–0.6)
MONOCYTES NFR BLD AUTO: 3.4 % — SIGNIFICANT CHANGE UP (ref 1.7–9.3)
MONOCYTES NFR BLD AUTO: 3.4 % — SIGNIFICANT CHANGE UP (ref 1.7–9.3)
MORPHINE UR QL CFM: SIGNIFICANT CHANGE UP NG/ML
MORPHINE UR QL CFM: SIGNIFICANT CHANGE UP NG/ML
MORPHINE, UR RESULT: SIGNIFICANT CHANGE UP NG/ML
MORPHINE, UR RESULT: SIGNIFICANT CHANGE UP NG/ML
NALOXONE RESULT: NEGATIVE NG/ML — SIGNIFICANT CHANGE UP
NALOXONE RESULT: NEGATIVE NG/ML — SIGNIFICANT CHANGE UP
NALOXONE UR CFM-MCNC: NEGATIVE NG/ML — SIGNIFICANT CHANGE UP
NALOXONE UR CFM-MCNC: NEGATIVE NG/ML — SIGNIFICANT CHANGE UP
NALTREXONE RESULT: NEGATIVE NG/ML — SIGNIFICANT CHANGE UP
NALTREXONE RESULT: NEGATIVE NG/ML — SIGNIFICANT CHANGE UP
NEUTROPHILS # BLD AUTO: 14.21 K/UL — HIGH (ref 1.4–6.5)
NEUTROPHILS # BLD AUTO: 14.21 K/UL — HIGH (ref 1.4–6.5)
NEUTROPHILS NFR BLD AUTO: 86.8 % — HIGH (ref 42.2–75.2)
NEUTROPHILS NFR BLD AUTO: 86.8 % — HIGH (ref 42.2–75.2)
NRBC # BLD: 0 /100 WBCS — SIGNIFICANT CHANGE UP (ref 0–0)
NRBC # BLD: 0 /100 WBCS — SIGNIFICANT CHANGE UP (ref 0–0)
OPIATES IN-HOUSE INTERPRETATION: POSITIVE
OPIATES IN-HOUSE INTERPRETATION: POSITIVE
OPIATES UR QL CFM: POSITIVE
OPIATES UR QL CFM: POSITIVE
PLATELET # BLD AUTO: 183 K/UL — SIGNIFICANT CHANGE UP (ref 130–400)
PLATELET # BLD AUTO: 183 K/UL — SIGNIFICANT CHANGE UP (ref 130–400)
PMV BLD: 11.2 FL — HIGH (ref 7.4–10.4)
PMV BLD: 11.2 FL — HIGH (ref 7.4–10.4)
POTASSIUM SERPL-MCNC: 4 MMOL/L — SIGNIFICANT CHANGE UP (ref 3.5–5)
POTASSIUM SERPL-MCNC: 4 MMOL/L — SIGNIFICANT CHANGE UP (ref 3.5–5)
POTASSIUM SERPL-SCNC: 4 MMOL/L — SIGNIFICANT CHANGE UP (ref 3.5–5)
POTASSIUM SERPL-SCNC: 4 MMOL/L — SIGNIFICANT CHANGE UP (ref 3.5–5)
RBC # BLD: 4.82 M/UL — SIGNIFICANT CHANGE UP (ref 4.2–5.4)
RBC # BLD: 4.82 M/UL — SIGNIFICANT CHANGE UP (ref 4.2–5.4)
RBC # FLD: 14.8 % — HIGH (ref 11.5–14.5)
RBC # FLD: 14.8 % — HIGH (ref 11.5–14.5)
S PNEUM AG UR QL: NEGATIVE — SIGNIFICANT CHANGE UP
S PNEUM AG UR QL: NEGATIVE — SIGNIFICANT CHANGE UP
SODIUM SERPL-SCNC: 140 MMOL/L — SIGNIFICANT CHANGE UP (ref 135–146)
SODIUM SERPL-SCNC: 140 MMOL/L — SIGNIFICANT CHANGE UP (ref 135–146)
SPECIMEN SOURCE: SIGNIFICANT CHANGE UP
TAPENTADOL RESULT: NEGATIVE NG/ML — SIGNIFICANT CHANGE UP
TAPENTADOL RESULT: NEGATIVE NG/ML — SIGNIFICANT CHANGE UP
WBC # BLD: 16.37 K/UL — HIGH (ref 4.8–10.8)
WBC # BLD: 16.37 K/UL — HIGH (ref 4.8–10.8)
WBC # FLD AUTO: 16.37 K/UL — HIGH (ref 4.8–10.8)
WBC # FLD AUTO: 16.37 K/UL — HIGH (ref 4.8–10.8)

## 2023-12-21 PROCEDURE — 95822 EEG COMA OR SLEEP ONLY: CPT | Mod: 26

## 2023-12-21 PROCEDURE — 99291 CRITICAL CARE FIRST HOUR: CPT

## 2023-12-21 PROCEDURE — 99497 ADVNCD CARE PLAN 30 MIN: CPT | Mod: 25

## 2023-12-21 PROCEDURE — 71045 X-RAY EXAM CHEST 1 VIEW: CPT | Mod: 26

## 2023-12-21 PROCEDURE — 99233 SBSQ HOSP IP/OBS HIGH 50: CPT

## 2023-12-21 RX ORDER — IPRATROPIUM/ALBUTEROL SULFATE 18-103MCG
3 AEROSOL WITH ADAPTER (GRAM) INHALATION EVERY 6 HOURS
Refills: 0 | Status: DISCONTINUED | OUTPATIENT
Start: 2023-12-21 | End: 2023-12-29

## 2023-12-21 RX ORDER — AMITRIPTYLINE HCL 25 MG
25 TABLET ORAL AT BEDTIME
Refills: 0 | Status: DISCONTINUED | OUTPATIENT
Start: 2023-12-21 | End: 2023-12-29

## 2023-12-21 RX ORDER — ACETAMINOPHEN 500 MG
975 TABLET ORAL EVERY 8 HOURS
Refills: 0 | Status: DISCONTINUED | OUTPATIENT
Start: 2023-12-21 | End: 2023-12-24

## 2023-12-21 RX ORDER — PANTOPRAZOLE SODIUM 20 MG/1
40 TABLET, DELAYED RELEASE ORAL
Refills: 0 | Status: DISCONTINUED | OUTPATIENT
Start: 2023-12-21 | End: 2023-12-29

## 2023-12-21 RX ORDER — CHLORHEXIDINE GLUCONATE 213 G/1000ML
1 SOLUTION TOPICAL
Refills: 0 | Status: DISCONTINUED | OUTPATIENT
Start: 2023-12-21 | End: 2023-12-29

## 2023-12-21 RX ORDER — METOPROLOL TARTRATE 50 MG
12.5 TABLET ORAL EVERY 12 HOURS
Refills: 0 | Status: DISCONTINUED | OUTPATIENT
Start: 2023-12-21 | End: 2023-12-21

## 2023-12-21 RX ORDER — PANTOPRAZOLE SODIUM 20 MG/1
40 TABLET, DELAYED RELEASE ORAL DAILY
Refills: 0 | Status: DISCONTINUED | OUTPATIENT
Start: 2023-12-21 | End: 2023-12-21

## 2023-12-21 RX ORDER — METOPROLOL TARTRATE 50 MG
25 TABLET ORAL
Refills: 0 | Status: DISCONTINUED | OUTPATIENT
Start: 2023-12-21 | End: 2023-12-22

## 2023-12-21 RX ADMIN — AZITHROMYCIN 255 MILLIGRAM(S): 500 TABLET, FILM COATED ORAL at 22:34

## 2023-12-21 RX ADMIN — Medication 3 MILLILITER(S): at 11:00

## 2023-12-21 RX ADMIN — ENOXAPARIN SODIUM 40 MILLIGRAM(S): 100 INJECTION SUBCUTANEOUS at 10:20

## 2023-12-21 RX ADMIN — CEFTRIAXONE 100 MILLIGRAM(S): 500 INJECTION, POWDER, FOR SOLUTION INTRAMUSCULAR; INTRAVENOUS at 10:20

## 2023-12-21 RX ADMIN — Medication 3 MILLILITER(S): at 14:19

## 2023-12-21 RX ADMIN — Medication 75 MILLIGRAM(S): at 05:18

## 2023-12-21 RX ADMIN — Medication 25 MILLIGRAM(S): at 17:09

## 2023-12-21 RX ADMIN — Medication 25 MILLIGRAM(S): at 22:34

## 2023-12-21 RX ADMIN — CHLORHEXIDINE GLUCONATE 1 APPLICATION(S): 213 SOLUTION TOPICAL at 06:15

## 2023-12-21 RX ADMIN — PANTOPRAZOLE SODIUM 40 MILLIGRAM(S): 20 TABLET, DELAYED RELEASE ORAL at 10:00

## 2023-12-21 RX ADMIN — ESCITALOPRAM OXALATE 10 MILLIGRAM(S): 10 TABLET, FILM COATED ORAL at 12:09

## 2023-12-21 RX ADMIN — Medication 25 MILLIGRAM(S): at 10:00

## 2023-12-21 RX ADMIN — Medication 75 MILLIGRAM(S): at 17:09

## 2023-12-21 NOTE — CHART NOTE - NSCHARTNOTEFT_GEN_A_CORE
MICU DOWN GRADE NOTE      Patient is a 63y old  Female who presents with a chief complaint of AHRF (21 Dec 2023 10:41)      HPI:    INTERVAL HPI/OVERNIGHT EVENTS:        REVIEW OF SYSTEMS:  CONSTITUTIONAL: No fever, chills  HEENT:  No blurry vision No sinus or throat pain  NECK: No pain or stiffness  RESPIRATORY: No cough, wheezing, chills or hemoptysis; No shortness of breath  CARDIOVASCULAR: No chest pain, palpitations  GASTROINTESTINAL: No abdominal pain. No nausea, vomiting, or diarrhea  GENITOURINARY: No dysuria  NEUROLOGICAL: No HA, No focal weakness  SKIN: No itching, burning, rashes, or lesions   MUSCULOSKELETAL: No joint pain or swelling; No muscle, back, or extremity pain    MEDICATIONS:  acetaminophen     Tablet .. 975 milliGRAM(s) Oral every 8 hours PRN  albuterol/ipratropium for Nebulization 3 milliLiter(s) Nebulizer every 6 hours PRN  amitriptyline 25 milliGRAM(s) Oral at bedtime  azithromycin  IVPB 500 milliGRAM(s) IV Intermittent every 24 hours  benzonatate 100 milliGRAM(s) Oral three times a day PRN  cefTRIAXone   IVPB 1000 milliGRAM(s) IV Intermittent every 24 hours  chlorhexidine 2% Cloths 1 Application(s) Topical <User Schedule>  enoxaparin Injectable 40 milliGRAM(s) SubCutaneous every 24 hours  escitalopram 10 milliGRAM(s) Oral daily  metoprolol tartrate 25 milliGRAM(s) Oral two times a day  oseltamivir 75 milliGRAM(s) Oral two times a day  pantoprazole    Tablet 40 milliGRAM(s) Oral before breakfast      T(C): 37.3 (12-21-23 @ 08:00), Max: 37.7 (12-20-23 @ 12:00)  HR: 92 (12-21-23 @ 11:00) (87 - 103)  BP: 165/71 (12-21-23 @ 11:00) (148/67 - 203/91)  RR: 22 (12-21-23 @ 11:00) (14 - 33)  SpO2: 98% (12-21-23 @ 11:00) (92% - 99%)  Wt(kg): --Vital Signs Last 24 Hrs  T(C): 37.3 (21 Dec 2023 08:00), Max: 37.7 (20 Dec 2023 12:00)  T(F): 99.1 (21 Dec 2023 08:00), Max: 99.8 (20 Dec 2023 12:00)  HR: 92 (21 Dec 2023 11:00) (87 - 103)  BP: 165/71 (21 Dec 2023 11:00) (148/67 - 203/91)  BP(mean): 102 (21 Dec 2023 11:00) (96 - 130)  RR: 22 (21 Dec 2023 11:00) (14 - 33)  SpO2: 98% (21 Dec 2023 11:00) (92% - 99%)    Parameters below as of 21 Dec 2023 11:00  Patient On (Oxygen Delivery Method): nasal cannula  O2 Flow (L/min): 2      PHYSICAL EXAM:  GENERAL: NAD, well-groomed, well-developed  HEAD:  Atraumatic, Normocephalic  EYES: EOMI, PERRLA, conjunctiva and sclera clear  ENMT:  Moist mucous membranes  NECK: Supple, No JVD,  CHEST/LUNG: Clear to auscultation bilaterally; No rales, rhonchi, wheezing, or rubs  HEART: Regular rate and rhythm; No murmurs, rubs, or gallops  ABDOMEN: Soft, Nontender, Nondistended; Bowel sounds present  NEURO: Alert & Oriented X3  EXTREMITIES: No LE edema, no calf tenderness  LYMPH: No lymphadenopathy noted  SKIN: No rashes or lesions    Consultant(s) Notes Reviewed:  [x ] YES  [ ] NO  Care Discussed with Consultants/Other Providers [ x] YES  [ ] NO    LABS:                        12.8   16.37 )-----------( 183      ( 21 Dec 2023 04:29 )             39.4     12-21    140  |  101  |  11  ----------------------------<  104<H>  4.0   |  27  |  0.9    Ca    8.3<L>      21 Dec 2023 04:29  Phos  4.7     12-20  Mg     2.9     12-21    TPro  6.2  /  Alb  3.5  /  TBili  0.4  /  DBili  x   /  AST  36  /  ALT  54<H>  /  AlkPhos  109  12-20    PT/INR - ( 19 Dec 2023 22:17 )   PT: 11.10 sec;   INR: 0.97 ratio         PTT - ( 19 Dec 2023 22:17 )  PTT:25.6 sec  Urinalysis Basic - ( 21 Dec 2023 04:29 )    Color: x / Appearance: x / SG: x / pH: x  Gluc: 104 mg/dL / Ketone: x  / Bili: x / Urobili: x   Blood: x / Protein: x / Nitrite: x   Leuk Esterase: x / RBC: x / WBC x   Sq Epi: x / Non Sq Epi: x / Bacteria: x      CAPILLARY BLOOD GLUCOSE          ABG - ( 20 Dec 2023 14:42 )  pH, Arterial: 7.41  pH, Blood: x     /  pCO2: 46    /  pO2: 69    / HCO3: 29    / Base Excess: 3.6   /  SaO2: 95.1              Urinalysis Basic - ( 21 Dec 2023 04:29 )    Color: x / Appearance: x / SG: x / pH: x  Gluc: 104 mg/dL / Ketone: x  / Bili: x / Urobili: x   Blood: x / Protein: x / Nitrite: x   Leuk Esterase: x / RBC: x / WBC x   Sq Epi: x / Non Sq Epi: x / Bacteria: x        RADIOLOGY & ADDITIONAL TESTS:    Imaging Personally Reviewed:  [x ] YES  [ ] NO MICU DOWN GRADE NOTE      Patient is a 63y old  Female who presents with a chief complaint of AHRF (21 Dec 2023 10:41)      HPI:  64 yo F with PMHx of anxiety/MDD, Hypothyroidism and fibromyalgia BIBEMS to the hospital for AMS. Family called EMS because they found patient obtunded and altered at home today, they were concerned that patient may have overdosed on her sleeping pills. Family do describe that for the past 2 days patient has been having a cough, yesterday evening she started becoming progressively lethargic and thus he called EMS. Son denies any fever chills, seizure like activity fall or trauma while at home. Further history unable to be assessed at this moment.     Upon arrival a code stroke was called for AMS with CTA head neg for large vessel occlusion. In the ED patient was hypertensive and hypoxemic. The decision to intubate her was done for protection of airways. Patient admitted to ICU for AHRF 2/2 bilateral pneumonia       INTERVAL HPI/OVERNIGHT EVENTS:        MEDICATIONS:  acetaminophen     Tablet .. 975 milliGRAM(s) Oral every 8 hours PRN  albuterol/ipratropium for Nebulization 3 milliLiter(s) Nebulizer every 6 hours PRN  amitriptyline 25 milliGRAM(s) Oral at bedtime  azithromycin  IVPB 500 milliGRAM(s) IV Intermittent every 24 hours  benzonatate 100 milliGRAM(s) Oral three times a day PRN  cefTRIAXone   IVPB 1000 milliGRAM(s) IV Intermittent every 24 hours  chlorhexidine 2% Cloths 1 Application(s) Topical <User Schedule>  enoxaparin Injectable 40 milliGRAM(s) SubCutaneous every 24 hours  escitalopram 10 milliGRAM(s) Oral daily  metoprolol tartrate 25 milliGRAM(s) Oral two times a day  oseltamivir 75 milliGRAM(s) Oral two times a day  pantoprazole    Tablet 40 milliGRAM(s) Oral before breakfast      T(C): 37.3 (12-21-23 @ 08:00), Max: 37.7 (12-20-23 @ 12:00)  HR: 92 (12-21-23 @ 11:00) (87 - 103)  BP: 165/71 (12-21-23 @ 11:00) (148/67 - 203/91)  RR: 22 (12-21-23 @ 11:00) (14 - 33)  SpO2: 98% (12-21-23 @ 11:00) (92% - 99%)  Wt(kg): --Vital Signs Last 24 Hrs  T(C): 37.3 (21 Dec 2023 08:00), Max: 37.7 (20 Dec 2023 12:00)  T(F): 99.1 (21 Dec 2023 08:00), Max: 99.8 (20 Dec 2023 12:00)  HR: 92 (21 Dec 2023 11:00) (87 - 103)  BP: 165/71 (21 Dec 2023 11:00) (148/67 - 203/91)  BP(mean): 102 (21 Dec 2023 11:00) (96 - 130)  RR: 22 (21 Dec 2023 11:00) (14 - 33)  SpO2: 98% (21 Dec 2023 11:00) (92% - 99%)    Parameters below as of 21 Dec 2023 11:00  Patient On (Oxygen Delivery Method): nasal cannula  O2 Flow (L/min): 2      PHYSICAL EXAM:  CONSTITUTIONAL: NAD  ENT: Airway patent, Mouth with normal mucosa. No thrush  EYES: Pupils equal. Round and reactive to light.  CARDIAC: Normal rate, Regular rhythm.    RESPIRATORY: Decreased bilateral air entry, Mild right sided wheezing, Bilateral BS, Normal chest expansion. Not tachypneic, No use of accessory muscles  GASTROINTESTINAL: Abdomen soft, Non-tender, No guarding, + BS  MUSCULOSKELETAL: Range of motion is not limited  NEUROLOGICAL: Alert and oriented x4  SKIN: Skin normal color for race, Warm and dry. Facial flushing   PSYCHIATRIC: No apparent risk to self or others      Consultant(s) Notes Reviewed:  [x ] YES  [ ] NO  Care Discussed with Consultants/Other Providers [ x] YES  [ ] NO    LABS:                        12.8   16.37 )-----------( 183      ( 21 Dec 2023 04:29 )             39.4     12-21    140  |  101  |  11  ----------------------------<  104<H>  4.0   |  27  |  0.9    Ca    8.3<L>      21 Dec 2023 04:29  Phos  4.7     12-20  Mg     2.9     12-21    TPro  6.2  /  Alb  3.5  /  TBili  0.4  /  DBili  x   /  AST  36  /  ALT  54<H>  /  AlkPhos  109  12-20    PT/INR - ( 19 Dec 2023 22:17 )   PT: 11.10 sec;   INR: 0.97 ratio         PTT - ( 19 Dec 2023 22:17 )  PTT:25.6 sec  Urinalysis Basic - ( 21 Dec 2023 04:29 )    Color: x / Appearance: x / SG: x / pH: x  Gluc: 104 mg/dL / Ketone: x  / Bili: x / Urobili: x   Blood: x / Protein: x / Nitrite: x   Leuk Esterase: x / RBC: x / WBC x   Sq Epi: x / Non Sq Epi: x / Bacteria: x      CAPILLARY BLOOD GLUCOSE          ABG - ( 20 Dec 2023 14:42 )  pH, Arterial: 7.41  pH, Blood: x     /  pCO2: 46    /  pO2: 69    / HCO3: 29    / Base Excess: 3.6   /  SaO2: 95.1              Urinalysis Basic - ( 21 Dec 2023 04:29 )    Color: x / Appearance: x / SG: x / pH: x  Gluc: 104 mg/dL / Ketone: x  / Bili: x / Urobili: x   Blood: x / Protein: x / Nitrite: x   Leuk Esterase: x / RBC: x / WBC x   Sq Epi: x / Non Sq Epi: x / Bacteria: x        RADIOLOGY & ADDITIONAL TESTS:    Imaging Personally Reviewed:  [x ] YES  [ ] NO    ASSESSMENT/PLAN     IMPRESSION:  #Acute Hypoxemic Respiratory Failure SP Intubation 12/19, Extubated 12/20   #Influenza A Pneumonia   #Possible superimposed bacterial infection  #Left Cerebellar venous Anomaly   #HO Fribromyalgia   #HO Hypothyroidism   #HO Anxiety     PLAN  #Acute Hypoxemic Respiratory Failure SP Intubation 12/19, Extubated 12/20   #Influenza A Pneumonia   #Possible superimposed bacterial infection   - s/p exubation 12/20 @ 14:30, on 2L NC  - c/w oseltamivir 75 mg oral BID, ceftriaxone 1000 mg IV q24 hours and azithromycin 500 mg IV q24  - c/p benzonatate 100mg TID PRN    #Left Cerebellar venous Anomaly   - f/u MRI if done  - f/u neuro    #HO Fribromyalgia   - c/w amytriptyline 25mg qHS    #HO Hypothyroidism     #HO Anxiety/Depression  - c/o escitalopram 10mg q24    #DVT ppx: Lovenox 40mg daily  #GI ppx: protonix 40mg daily MICU DOWN GRADE NOTE      Patient is a 63y old  Female who presents with a chief complaint of AHRF (21 Dec 2023 10:41)      HPI:  64 yo F with PMHx of anxiety/MDD, Hypothyroidism and fibromyalgia BIBEMS to the hospital for AMS. Family called EMS because they found patient obtunded and altered at home today, they were concerned that patient may have overdosed on her sleeping pills. Family do describe that for the past 2 days patient has been having a cough, yesterday evening she started becoming progressively lethargic and thus he called EMS. Son denies any fever chills, seizure like activity fall or trauma while at home. Further history unable to be assessed at this moment.     Upon arrival a code stroke was called for AMS with CTA head neg for large vessel occlusion. In the ED patient was hypertensive and hypoxemic. The decision to intubate her was done for protection of airways. Patient admitted to ICU for AHRF 2/2 bilateral pneumonia       INTERVAL HPI/OVERNIGHT EVENTS:  12/20: Patient extubated @ 14:30, on 3L NC, SPO2 100%.  12/21: Patient hypertensive overnight to 200s systolic, given nifedipine x1. Started on metoprolol 25mg BID. Patient stable for downgrade to floor.         MEDICATIONS:  acetaminophen     Tablet .. 975 milliGRAM(s) Oral every 8 hours PRN  albuterol/ipratropium for Nebulization 3 milliLiter(s) Nebulizer every 6 hours PRN  amitriptyline 25 milliGRAM(s) Oral at bedtime  azithromycin  IVPB 500 milliGRAM(s) IV Intermittent every 24 hours  benzonatate 100 milliGRAM(s) Oral three times a day PRN  cefTRIAXone   IVPB 1000 milliGRAM(s) IV Intermittent every 24 hours  chlorhexidine 2% Cloths 1 Application(s) Topical <User Schedule>  enoxaparin Injectable 40 milliGRAM(s) SubCutaneous every 24 hours  escitalopram 10 milliGRAM(s) Oral daily  metoprolol tartrate 25 milliGRAM(s) Oral two times a day  oseltamivir 75 milliGRAM(s) Oral two times a day  pantoprazole    Tablet 40 milliGRAM(s) Oral before breakfast      T(C): 37.3 (12-21-23 @ 08:00), Max: 37.7 (12-20-23 @ 12:00)  HR: 92 (12-21-23 @ 11:00) (87 - 103)  BP: 165/71 (12-21-23 @ 11:00) (148/67 - 203/91)  RR: 22 (12-21-23 @ 11:00) (14 - 33)  SpO2: 98% (12-21-23 @ 11:00) (92% - 99%)  Wt(kg): --Vital Signs Last 24 Hrs  T(C): 37.3 (21 Dec 2023 08:00), Max: 37.7 (20 Dec 2023 12:00)  T(F): 99.1 (21 Dec 2023 08:00), Max: 99.8 (20 Dec 2023 12:00)  HR: 92 (21 Dec 2023 11:00) (87 - 103)  BP: 165/71 (21 Dec 2023 11:00) (148/67 - 203/91)  BP(mean): 102 (21 Dec 2023 11:00) (96 - 130)  RR: 22 (21 Dec 2023 11:00) (14 - 33)  SpO2: 98% (21 Dec 2023 11:00) (92% - 99%)    Parameters below as of 21 Dec 2023 11:00  Patient On (Oxygen Delivery Method): nasal cannula  O2 Flow (L/min): 2      PHYSICAL EXAM:  CONSTITUTIONAL: NAD  ENT: Airway patent, Mouth with normal mucosa. No thrush  EYES: Pupils equal. Round and reactive to light.  CARDIAC: Normal rate, Regular rhythm.    RESPIRATORY: Decreased bilateral air entry, Mild right sided wheezing, Bilateral BS, Normal chest expansion. Not tachypneic, No use of accessory muscles  GASTROINTESTINAL: Abdomen soft, Non-tender, No guarding, + BS  MUSCULOSKELETAL: Range of motion is not limited  NEUROLOGICAL: Alert and oriented x4  SKIN: Skin normal color for race, Warm and dry. Facial flushing   PSYCHIATRIC: No apparent risk to self or others      Consultant(s) Notes Reviewed:  [x ] YES  [ ] NO  Care Discussed with Consultants/Other Providers [ x] YES  [ ] NO    LABS:                        12.8   16.37 )-----------( 183      ( 21 Dec 2023 04:29 )             39.4     12-21    140  |  101  |  11  ----------------------------<  104<H>  4.0   |  27  |  0.9    Ca    8.3<L>      21 Dec 2023 04:29  Phos  4.7     12-20  Mg     2.9     12-21    TPro  6.2  /  Alb  3.5  /  TBili  0.4  /  DBili  x   /  AST  36  /  ALT  54<H>  /  AlkPhos  109  12-20    PT/INR - ( 19 Dec 2023 22:17 )   PT: 11.10 sec;   INR: 0.97 ratio         PTT - ( 19 Dec 2023 22:17 )  PTT:25.6 sec  Urinalysis Basic - ( 21 Dec 2023 04:29 )    Color: x / Appearance: x / SG: x / pH: x  Gluc: 104 mg/dL / Ketone: x  / Bili: x / Urobili: x   Blood: x / Protein: x / Nitrite: x   Leuk Esterase: x / RBC: x / WBC x   Sq Epi: x / Non Sq Epi: x / Bacteria: x      CAPILLARY BLOOD GLUCOSE          ABG - ( 20 Dec 2023 14:42 )  pH, Arterial: 7.41  pH, Blood: x     /  pCO2: 46    /  pO2: 69    / HCO3: 29    / Base Excess: 3.6   /  SaO2: 95.1              Urinalysis Basic - ( 21 Dec 2023 04:29 )    Color: x / Appearance: x / SG: x / pH: x  Gluc: 104 mg/dL / Ketone: x  / Bili: x / Urobili: x   Blood: x / Protein: x / Nitrite: x   Leuk Esterase: x / RBC: x / WBC x   Sq Epi: x / Non Sq Epi: x / Bacteria: x        RADIOLOGY & ADDITIONAL TESTS:    Imaging Personally Reviewed:  [x ] YES  [ ] NO    ASSESSMENT/PLAN     IMPRESSION:  #Acute Hypoxemic Respiratory Failure SP Intubation 12/19, Extubated 12/20   #Influenza A Pneumonia   #Possible superimposed bacterial infection  #Left Cerebellar venous Anomaly   #HO Fribromyalgia   #HO Hypothyroidism   #HO Anxiety     PLAN  #Acute Hypoxemic Respiratory Failure SP Intubation 12/19, Extubated 12/20   #Influenza A Pneumonia   #Possible superimposed bacterial infection   - s/p exubation 12/20 @ 14:30, on 2L NC  - c/w oseltamivir 75 mg oral BID, ceftriaxone 1000 mg IV q24 hours and azithromycin 500 mg IV q24  - c/p benzonatate 100mg TID PRN    #Left Cerebellar venous Anomaly   - f/u MRI if done  - f/u neuro    #HO Fribromyalgia   - c/w amytriptyline 25mg qHS    #HO Hypothyroidism     #HO Anxiety/Depression  - c/o escitalopram 10mg q24    #DVT ppx: Lovenox 40mg daily  #GI ppx: protonix 40mg daily  #Diet: Soft and bite-sized MICU DOWN GRADE NOTE      Patient is a 63y old  Female who presents with a chief complaint of AHRF (21 Dec 2023 10:41)      HPI:  62 yo F with PMHx of anxiety/MDD, Hypothyroidism and fibromyalgia BIBEMS to the hospital for AMS. Family called EMS because they found patient obtunded and altered at home today, they were concerned that patient may have overdosed on her sleeping pills. Family do describe that for the past 2 days patient has been having a cough, yesterday evening she started becoming progressively lethargic and thus he called EMS. Son denies any fever chills, seizure like activity fall or trauma while at home. Further history unable to be assessed at this moment.     Upon arrival a code stroke was called for AMS with CTA head neg for large vessel occlusion. In the ED patient was hypertensive and hypoxemic. The decision to intubate her was done for protection of airways. Patient admitted to ICU for AHRF 2/2 bilateral pneumonia       INTERVAL HPI/OVERNIGHT EVENTS:  12/20: Patient extubated @ 14:30, on 3L NC, SPO2 100%.  12/21: Patient hypertensive overnight to 200s systolic, given nifedipine x1. Started on metoprolol 25mg Q12. Patient stable for downgrade to floor.   12/22: Patient hypertensive overnight to 170s-200s systolic, started on Norvasc 5mg Q24 and increased metoprolol to 25mg Q8. Otherwise, no acute overnight events. Patient remains otherwise stable.       MEDICATIONS:  acetaminophen     Tablet .. 975 milliGRAM(s) Oral every 8 hours PRN  albuterol/ipratropium for Nebulization 3 milliLiter(s) Nebulizer every 6 hours PRN  amitriptyline 25 milliGRAM(s) Oral at bedtime  azithromycin  IVPB 500 milliGRAM(s) IV Intermittent every 24 hours  benzonatate 100 milliGRAM(s) Oral three times a day PRN  cefTRIAXone   IVPB 1000 milliGRAM(s) IV Intermittent every 24 hours  chlorhexidine 2% Cloths 1 Application(s) Topical <User Schedule>  enoxaparin Injectable 40 milliGRAM(s) SubCutaneous every 24 hours  escitalopram 10 milliGRAM(s) Oral daily  metoprolol tartrate 25 milliGRAM(s) Oral two times a day  oseltamivir 75 milliGRAM(s) Oral two times a day  pantoprazole    Tablet 40 milliGRAM(s) Oral before breakfast      T(C): 37.3 (12-21-23 @ 08:00), Max: 37.7 (12-20-23 @ 12:00)  HR: 92 (12-21-23 @ 11:00) (87 - 103)  BP: 165/71 (12-21-23 @ 11:00) (148/67 - 203/91)  RR: 22 (12-21-23 @ 11:00) (14 - 33)  SpO2: 98% (12-21-23 @ 11:00) (92% - 99%)  Wt(kg): --Vital Signs Last 24 Hrs  T(C): 37.3 (21 Dec 2023 08:00), Max: 37.7 (20 Dec 2023 12:00)  T(F): 99.1 (21 Dec 2023 08:00), Max: 99.8 (20 Dec 2023 12:00)  HR: 92 (21 Dec 2023 11:00) (87 - 103)  BP: 165/71 (21 Dec 2023 11:00) (148/67 - 203/91)  BP(mean): 102 (21 Dec 2023 11:00) (96 - 130)  RR: 22 (21 Dec 2023 11:00) (14 - 33)  SpO2: 98% (21 Dec 2023 11:00) (92% - 99%)    Parameters below as of 21 Dec 2023 11:00  Patient On (Oxygen Delivery Method): nasal cannula  O2 Flow (L/min): 2      PHYSICAL EXAM:  CONSTITUTIONAL: NAD  ENT: Airway patent, Mouth with normal mucosa. No thrush  EYES: Pupils equal. Round and reactive to light.  CARDIAC: Normal rate, Regular rhythm.    RESPIRATORY: Decreased bilateral air entry, Mild right sided wheezing, Bilateral BS, Normal chest expansion. Not tachypneic, No use of accessory muscles  GASTROINTESTINAL: Abdomen soft, Non-tender, No guarding, + BS  MUSCULOSKELETAL: Range of motion is not limited  NEUROLOGICAL: Alert and oriented x4  SKIN: Skin normal color for race, Warm and dry. Facial flushing   PSYCHIATRIC: No apparent risk to self or others      Consultant(s) Notes Reviewed:  [x ] YES  [ ] NO  Care Discussed with Consultants/Other Providers [ x] YES  [ ] NO    LABS:                        12.8   16.37 )-----------( 183      ( 21 Dec 2023 04:29 )             39.4     12-21    140  |  101  |  11  ----------------------------<  104<H>  4.0   |  27  |  0.9    Ca    8.3<L>      21 Dec 2023 04:29  Phos  4.7     12-20  Mg     2.9     12-21    TPro  6.2  /  Alb  3.5  /  TBili  0.4  /  DBili  x   /  AST  36  /  ALT  54<H>  /  AlkPhos  109  12-20    PT/INR - ( 19 Dec 2023 22:17 )   PT: 11.10 sec;   INR: 0.97 ratio         PTT - ( 19 Dec 2023 22:17 )  PTT:25.6 sec  Urinalysis Basic - ( 21 Dec 2023 04:29 )    Color: x / Appearance: x / SG: x / pH: x  Gluc: 104 mg/dL / Ketone: x  / Bili: x / Urobili: x   Blood: x / Protein: x / Nitrite: x   Leuk Esterase: x / RBC: x / WBC x   Sq Epi: x / Non Sq Epi: x / Bacteria: x      CAPILLARY BLOOD GLUCOSE          ABG - ( 20 Dec 2023 14:42 )  pH, Arterial: 7.41  pH, Blood: x     /  pCO2: 46    /  pO2: 69    / HCO3: 29    / Base Excess: 3.6   /  SaO2: 95.1              Urinalysis Basic - ( 21 Dec 2023 04:29 )    Color: x / Appearance: x / SG: x / pH: x  Gluc: 104 mg/dL / Ketone: x  / Bili: x / Urobili: x   Blood: x / Protein: x / Nitrite: x   Leuk Esterase: x / RBC: x / WBC x   Sq Epi: x / Non Sq Epi: x / Bacteria: x        RADIOLOGY & ADDITIONAL TESTS:    Imaging Personally Reviewed:  [x ] YES  [ ] NO    ASSESSMENT/PLAN     IMPRESSION:  #Acute Hypoxemic Respiratory Failure SP Intubation 12/19, Extubated 12/20   #Influenza A Pneumonia   #Possible superimposed bacterial infection  #Left Cerebellar venous Anomaly   #HO Fribromyalgia   #HO Hypothyroidism   #HO Anxiety     PLAN  #Acute Hypoxemic Respiratory Failure SP Intubation 12/19, Extubated 12/20   #Influenza A Pneumonia   #Possible superimposed bacterial infection   - s/p exubation 12/20 @ 14:30, on 2L NC  - c/w oseltamivir 75 mg oral BID, ceftriaxone 1000 mg IV q24 hours and azithromycin 500 mg IV q24 (on day 3 of 5)  - c/p benzonatate 100mg TID PRN    #Left Cerebellar venous Anomaly   - f/u MRI if done  - f/u neuro    #HO Fribromyalgia   - c/w amytriptyline 25mg qHS    #HO Hypothyroidism     #HO Anxiety/Depression  - c/o escitalopram 10mg q24    #DVT ppx: Lovenox 40mg daily  #GI ppx: protonix 40mg daily  #Diet: Soft and bite-sized

## 2023-12-21 NOTE — EEG REPORT - NS EEG TEXT BOX
[facility]  Inpatient Routine-EEG Report      Patient Name:	JOVANNI BERTRAND    :	1960  MRN:	-  Study Date/Time:	2023, 10:35:11 AM  Referred by:	-    Brief Clinical History:  JOVANNI BERTRAND is a 63 year old Female; study performed to investigate for seizures or markers of epilepsy.   Diagnosis Code: R40.4 Transient alteration of awareness    Patient Medication:  TYLENOL    ROCEPHIN    LOVENOX    ELAVIL    PERIDEX    LEVAPRO    APRESOLIZE    TORADOL    TAMIFLU    PTOTONIX    VANCOMYCIN    -      Acquisition Details:  Electroencephalography was acquired using a minimum of 21 channels on an [employer] system v 9.3.1 with electrode placement according to the standard International 10-20 system following ACNS (American Clinical Neurophysiology Society) guidelines.  Anterior temporal T1 and T2 electrodes were utilized whenever possible.   The [employer] automated spike & seizure detections were all reviewed in detail, in addition to the entire raw EEG.    Findings:  Background:  continuous.   Voltage:  Normal (20uV)  Organization:  Alpha alternating with low amplitude theta  Posterior Dominant Rhythm:  No clear PDR   Variability:  No	Reactivity:  No  Sleep:  Absent.  Focal abnormalities:  No persistent asymmetries of voltage or frequency.  Interictal Activity:  None  Focal Slowing:  None  Generalized Slowing:  Moderate  Events:  1)	No electrographic seizures or significant clinical events.  Provocations:  1)	Hyperventilation: was not performed.  2)	Photic stimulation: was not performed.  Impression:  Abnormal due to generalized slowing as above    Clinical Correlation:  Findings consistent with diffuse electrocerebral dysfunction secondary to nonspecific etiology. Cannot rule out effect of sedating medication    Pavan Marroquin MD  Attending Neurologist, Division of Epilepsy   [facility]  Inpatient Routine-EEG Report      Patient Name:	JOVANNI BERTRAND    :	1960  MRN:	-  Study Date/Time:	2023, 10:35:11 AM  Referred by:	-    Brief Clinical History:  JOVANNI BERTRAND is a 63 year old Female; study performed to investigate for seizures or markers of epilepsy.   Diagnosis Code: R40.4 Transient alteration of awareness    Patient Medication:  TYLENOL    ROCEPHIN    LOVENOX    ELAVIL    PERIDEX    LEVAPRO    APRESOLIZE    TORADOL    TAMIFLU    PTOTONIX    VANCOMYCIN    -      Acquisition Details:  Electroencephalography was acquired using a minimum of 21 channels on an [employer] system v 9.3.1 with electrode placement according to the standard International 10-20 system following ACNS (American Clinical Neurophysiology Society) guidelines.  Anterior temporal T1 and T2 electrodes were utilized whenever possible.   The XLTEK automated spike & seizure detections were all reviewed in detail, in addition to the entire raw EEG.    Findings:  Background:  continuous.   Voltage:  Normal (20uV)  Organization:  Alpha alternating with low amplitude theta  Posterior Dominant Rhythm:  No clear PDR   Variability:  No	Reactivity:  No  Sleep:  Absent.  Focal abnormalities:  No persistent asymmetries of voltage or frequency.  Interictal Activity:  None  Focal Slowing:  None  Generalized Slowing:  Moderate  Events:  1)	No electrographic seizures or significant clinical events.  Provocations:  1)	Hyperventilation: was not performed.  2)	Photic stimulation: was not performed.  Impression:  Abnormal due to generalized slowing as above    Clinical Correlation:  Findings consistent with diffuse electrocerebral dysfunction secondary to nonspecific etiology. Cannot rule out effect of sedating medication    Pavan Marroquin MD  Attending Neurologist, Division of Epilepsy   normal (ped)...

## 2023-12-21 NOTE — PROGRESS NOTE ADULT - SUBJECTIVE AND OBJECTIVE BOX
Patient is a 63y old  Female who presents with a chief complaint of AHRF (20 Dec 2023 16:04)        Over Night Events: SP extubation 14:30, on 2L NC sating 97%, hypertensive overnight to 200s systolic SP Nifedipine, IV-locked      Vital Signs Last 24 Hrs  T(C): 37.1 (21 Dec 2023 04:00), Max: 38.1 (20 Dec 2023 10:00)  T(F): 98.7 (21 Dec 2023 04:00), Max: 100.6 (20 Dec 2023 10:00)  HR: 103 (21 Dec 2023 08:00) (87 - 106)  BP: 158/74 (21 Dec 2023 07:00) (148/67 - 203/91)  BP(mean): 106 (21 Dec 2023 07:00) (96 - 130)  RR: 24 (21 Dec 2023 08:00) (14 - 33)  SpO2: 95% (21 Dec 2023 08:00) (92% - 100%)    O2 Parameters below as of 21 Dec 2023 08:00  Patient On (Oxygen Delivery Method): nasal cannula  O2 Flow (L/min): 2          CONSTITUTIONAL:   NAD    ENT:   Airway patent,   Mouth with normal mucosa.   No thrush    EYES:   Pupils equal  Round and reactive to light.    CARDIAC:   Normal rate,   Regular rhythm.        RESPIRATORY:   Decreased bilateral air entry   Mild right sided wheezing  Bilateral BS  Normal chest expansion  Not tachypneic,  No use of accessory muscles    GASTROINTESTINAL:  Abdomen soft,   Non-tender,   No guarding,   + BS      MUSCULOSKELETAL:  Range of motion is not limited    NEUROLOGICAL:   Alert and oriented x4      SKIN:   Skin normal color for race,   Warm and dry  facial flushing     PSYCHIATRIC:   No apparent risk to self or others.          12-20-23 @ 07:01  -  12-21-23 @ 07:00  --------------------------------------------------------  IN:    IV PiggyBack: 850 mL    Oral Fluid: 200 mL  Total IN: 1050 mL    OUT:    Indwelling Catheter - Urethral (mL): 3765 mL    Propofol: 0 mL  Total OUT: 3765 mL    Total NET: -2715 mL          LABS:                            12.8   16.37 )-----------( 183      ( 21 Dec 2023 04:29 )             39.4                                               12-21    140  |  101  |  11  ----------------------------<  104<H>  4.0   |  27  |  0.9    Ca    8.3<L>      21 Dec 2023 04:29  Phos  4.7     12-20  Mg     2.9     12-21    TPro  6.2  /  Alb  3.5  /  TBili  0.4  /  DBili  x   /  AST  36  /  ALT  54<H>  /  AlkPhos  109  12-20      PT/INR - ( 19 Dec 2023 22:17 )   PT: 11.10 sec;   INR: 0.97 ratio         PTT - ( 19 Dec 2023 22:17 )  PTT:25.6 sec                                       Urinalysis Basic - ( 21 Dec 2023 04:29 )    Color: x / Appearance: x / SG: x / pH: x  Gluc: 104 mg/dL / Ketone: x  / Bili: x / Urobili: x   Blood: x / Protein: x / Nitrite: x   Leuk Esterase: x / RBC: x / WBC x   Sq Epi: x / Non Sq Epi: x / Bacteria: x                                                  LIVER FUNCTIONS - ( 20 Dec 2023 16:28 )  Alb: 3.5 g/dL / Pro: 6.2 g/dL / ALK PHOS: 109 U/L / ALT: 54 U/L / AST: 36 U/L / GGT: x                                                  Culture - Sputum (collected 20 Dec 2023 10:32)  Source: Trach Asp Tracheal Aspirate  Gram Stain (21 Dec 2023 02:38):    Numerous polymorphonuclear leukocytes per low power field    No Squamous epithelial cells per low power field    Rare Gram positive cocci in pairs seen per oil power field    Culture - Urine (collected 20 Dec 2023 00:05)  Source: Catheterized Catheterized  Final Report (21 Dec 2023 07:43):    No growth    Culture - Blood (collected 19 Dec 2023 22:17)  Source: .Blood Blood  Preliminary Report (21 Dec 2023 07:02):    No growth at 24 hours    Culture - Blood (collected 19 Dec 2023 22:15)  Source: .Blood Blood  Preliminary Report (21 Dec 2023 07:02):    No growth at 24 hours                                                   Mode: CPAP with PS  FiO2: 40  PEEP: 8  PS: 5  PIP: 15                                      ABG - ( 20 Dec 2023 14:42 )  pH, Arterial: 7.41  pH, Blood: x     /  pCO2: 46    /  pO2: 69    / HCO3: 29    / Base Excess: 3.6   /  SaO2: 95.1                MEDICATIONS  (STANDING):  amitriptyline 25 milliGRAM(s) Oral at bedtime  azithromycin  IVPB 500 milliGRAM(s) IV Intermittent every 24 hours  cefTRIAXone   IVPB 1000 milliGRAM(s) IV Intermittent every 24 hours  chlorhexidine 2% Cloths 1 Application(s) Topical <User Schedule>  enoxaparin Injectable 40 milliGRAM(s) SubCutaneous every 24 hours  escitalopram 10 milliGRAM(s) Oral daily  oseltamivir 75 milliGRAM(s) Oral two times a day  pantoprazole   Suspension 40 milliGRAM(s) Oral daily  vancomycin  IVPB 500 milliGRAM(s) IV Intermittent every 12 hours    MEDICATIONS  (PRN):  acetaminophen     Tablet .. 975 milliGRAM(s) Oral every 8 hours PRN Temp greater or equal to 38.5C (101.3F)      CXR reviewed

## 2023-12-21 NOTE — PROGRESS NOTE ADULT - NS ATTEST RISK GEN_ALL_CORE
Progress Notes by Emma Blackwell RN at 04/02/18 09:07 AM     Author:  Emma Blackwell RN Service:  (none) Author Type:  Registered Nurse     Filed:  04/02/18 09:08 AM Encounter Date:  4/2/2018 Status:  Signed     :  Emma Blackwell RN (Registered Nurse)            Notes received from Hematology, Dr. Timbo Hernandez and scanned in pt's chart.  Dr. Hernandez recommended repeat CBC in 3 months from 2/12/18.  Pt will be seeing Dr. Maldonado for well exam on 4/26/18.  Future CBC order generated. Will remind mom at visit to have this lab done.[CM1.1M]      Revision History        User Key Date/Time User Provider Type Action    > CM1.1 04/02/18 09:08 AM Emma Blackwell, RN Registered Nurse Sign    M - Manual             Risk Statement (NON-critical care)

## 2023-12-21 NOTE — PROGRESS NOTE ADULT - ASSESSMENT
IMPRESSION:    Acute Hypoxemic Respiratory Failure SP Intubation 12/19, Extubated 12/20   Influenza A Pneumonia   Possible superimposed bacterial infection   Toxic Metabolic Encephalopathy, resolved   Left cerebellar venous anomaly    HO Fibromyalgia   HO Hypothyroidism   HO Anxiety       PLAN:    CNS: CTH unremarkable, drug/urine toxicology noted, Neuroendovascular recommendations noted, f/u MRI brain     HEENT: Oral care.     PULMONARY:  HOB @ 45 degrees.  Aspiration precautions. Anti-tussive, Chest PT, sputum culture, incentive spirometry, wean oxygen, target SAaO2 92-96%, Nebs Q6H and PRN     CARDIOVASCULAR: 2D-Echo EF 63%, not on pressors, troponin negative, start metoprolol 25mg BID     GI: GI prophylaxis.  Feeding as tolerated. Bowel regimen     RENAL:  Follow up lytes.  Correct as needed    INFECTIOUS DISEASE: Follow up cultures, Tamiflu, Ceftriaxone and azithromycin. procalcitonin 0.54, nasal MRSA negative DC Vanc, urine legionella and strep Ag     HEMATOLOGICAL:  DVT prophylaxis.  Dimer 2K, f/u LE duplex      ENDOCRINE:  Follow up FS.  Insulin protocol if needed.    MUSCULOSKELETAL: activity as tolerated, PT/OT    DC Baker/TOV    Goals of care    Downgrade to Floor    IMPRESSION:    Acute Hypoxemic Respiratory Failure SP Intubation 12/19, Extubated 12/20   Influenza A Pneumonia   Possible superimposed bacterial infection   Toxic Metabolic Encephalopathy, resolved   Left cerebellar venous anomaly    HO Fibromyalgia   HO Hypothyroidism   HO Anxiety       PLAN:    CNS: CTH unremarkable, drug/urine toxicology noted, Neuroendovascular recommendations noted, f/u MRI brain     HEENT: Oral care.     PULMONARY:  HOB @ 45 degrees.  Aspiration precautions. Anti-tussive, Chest PT, sputum culture, incentive spirometry, wean oxygen, target SAaO2 92-96%, Nebs Q6H and PRN     CARDIOVASCULAR: 2D-Echo EF 63%, not on pressors, troponin negative, start metoprolol 25mg BID     GI: GI prophylaxis.  Feeding as tolerated. Bowel regimen     RENAL:  Follow up lytes.  Correct as needed    INFECTIOUS DISEASE: Follow up cultures, Tamiflu, Ceftriaxone and azithromycin. procalcitonin 0.54, nasal MRSA negative.  DC Vanc, urine legionella and strep Ag     HEMATOLOGICAL:  DVT prophylaxis.  Dimer 2K, f/u LE duplex      ENDOCRINE:  Follow up FS.  Insulin protocol if needed.    MUSCULOSKELETAL: activity as tolerated, PT/OT    DC Baker/TOV    Goals of care    Downgrade to Floor

## 2023-12-21 NOTE — PHYSICAL THERAPY INITIAL EVALUATION ADULT - GAIT TRAINING, PT EVAL
100 ft without AD Independent by d/c                                   Stairs: 10 steps 1HR supervision by d/c

## 2023-12-21 NOTE — PROGRESS NOTE ADULT - ASSESSMENT
63y woman with history of anxiety/MDD, hypothyroidism, fibromyalgia presented with AMS and lethargy.  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  Patient now medically extubated. Palliative care consulted for [facility].    Spoke with patient at bedside.  She endorsed some SOB, but otherwise noted she was feeling better. Palliative care introduced. She knew she had the flu, but didn't know the rest of her hospital course.  Medical update provided.  We discussed code status, including DNR/DNI.  She noted that she may wish for DNR/DNI for the future, but wishes to maintain Full code now and speak with her  when she gets home. All questions answered.    Education about palliative care provided to patient/family.  See Recs below.    Please call [phone] with questions or concerns 24/7.   We will sign off.   63y woman with history of anxiety/MDD, hypothyroidism, fibromyalgia presented with AMS and lethargy.  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  Patient now medically extubated. Palliative care consulted for [facility].    Spoke with patient at bedside.  She endorsed some SOB, but otherwise noted she was feeling better. Palliative care introduced. She knew she had the flu, but didn't know the rest of her hospital course.  Medical update provided.  We discussed code status, including DNR/DNI.  She noted that she may wish for DNR/DNI for the future, but wishes to maintain Full code now and speak with her  when she gets home. All questions answered.    Education about palliative care provided to patient/family.  See Recs below.    Please call [phone] with questions or concerns 24/7.   We will sign off.

## 2023-12-21 NOTE — PROGRESS NOTE ADULT - NS ATTEST RISK PROBLEM GEN_ALL_CORE FT
[x ] 1 acute or chronic illnesses or injuries that may pose a threat to life or bodily function  2.2  [x ] Personally review and interpretation of  image or testing   2.3  [x ] Discussion of management or test interpretation with external physician/other qualified health care professional\appropriate source (not separately reported)

## 2023-12-21 NOTE — PROGRESS NOTE ADULT - SUBJECTIVE AND OBJECTIVE BOX
HPI:  62 yo F with PMHx of anxiety/MDD, Hypothyroidism and fibromyalgia BIBEMS to the hospital for AMS. Family called EMS because they found patient obtunded and altered at home today, they were concerned that patient may have overdosed on her sleeping pills. Family do describe that for the past 2 days patient has been having a cough, yesterday evening she started becoming progressively lethargic and thus he called EMS. Son denies any fever chills, seizure like activity fall or trauma while at home. Further history unable to be assessed at this moment.     Interval history  -Patient seen at bedside  -She is now extubated  -Endorses some SOB at time of visit     ADVANCE DIRECTIVES:     [x ] Full Code [ ] DNR  MOLST  [ ]  Living Will  [ ]   DECISION MAKER(s):  [ ] Health Care Proxy(s)  [x ] Surrogate(s)  [ ] Guardian           Name(s): Phone Number(s): Rylan      BASELINE (I)ADL(s) (prior to admission):    [place]: [ ]Total  [ ] Moderate [ ]Dependent  Palliative Performance Status Version 2:         %    http://npcrc.org/files/news/palliative_performance_scale_ppsv2.pdf    Allergies    No Known Allergies    Intolerances    MEDICATIONS  (STANDING):  amitriptyline 25 milliGRAM(s) Oral at bedtime  azithromycin  IVPB 500 milliGRAM(s) IV Intermittent every 24 hours  cefTRIAXone   IVPB 1000 milliGRAM(s) IV Intermittent every 24 hours  chlorhexidine 2% Cloths 1 Application(s) Topical <User Schedule>  enoxaparin Injectable 40 milliGRAM(s) SubCutaneous every 24 hours  escitalopram 10 milliGRAM(s) Oral daily  metoprolol tartrate 25 milliGRAM(s) Oral two times a day  oseltamivir 75 milliGRAM(s) Oral two times a day  pantoprazole    Tablet 40 milliGRAM(s) Oral before breakfast    MEDICATIONS  (PRN):  acetaminophen     Tablet .. 975 milliGRAM(s) Oral every 8 hours PRN Temp greater or equal to 38.5C (101.3F)  albuterol/ipratropium for Nebulization 3 milliLiter(s) Nebulizer every 6 hours PRN Shortness of Breath and/or Wheezing  benzonatate 100 milliGRAM(s) Oral three times a day PRN Cough      PRESENT SYMPTOMS: [ ]Unable to obtain due to poor mentation  Source if other than patient:  [ ]Family   [ ]Team     Pain: [ ]yes [x ]no  QOL impact -   Location -               Aggravating factors -   Quality -  Radiation -   Timing-   Severity (0-10 scale):   Minimal acceptable level (0-10 scale):     CPOT:    https://www.[facility].org/getattachment/[email or web address]/Critical-Care-Pain-Observation-Tool-(CPOT)    PAIN AD Score:   http://geriatrictoolkit.[facility]/cog/painad.pdf (press ctrl +  left click to view)    Dyspnea:                           [ ]None[ ]Mild [x ]Moderate [ ]Severe     Respiratory Distress Observation Scale (RDOS):   A score of 0 to 2 signifies little or no respiratory distress, 3 signifies mild distress, scores 4 to 6 indicate moderate distress, and scores greater than 7 signify severe distress  https://www.[facility].ca/sites/default/files/PDFS/251070-prkoxhpxgxn-cullpgti-vuwczjfumds-nnjue.pdf    Anxiety:                             [x ]None[ ]Mild [ ]Moderate [ ]Severe   Fatigue:                             [ ]None[ x]Mild [ ]Moderate [ ]Severe   Nausea:                             [ x]None[ ]Mild [ ]Moderate [ ]Severe   Loss of appetite:              [x ]None[ ]Mild [ ]Moderate [ ]Severe   Constipation:                    [ x]None[ ]Mild [ ]Moderate [ ]Severe    Other Symptoms:  [x ]All other review of systems negative     Palliative Performance Status Version 2:         30%    http://[facility].org/files/news/palliative_performance_scale_ppsv2.pdf    PHYSICAL EXAM:  Vital Signs Last 24 Hrs  T(C): 37.3 (21 Dec 2023 08:00), Max: 37.3 (20 Dec 2023 20:00)  T(F): 99.1 (21 Dec 2023 08:00), Max: 99.2 (20 Dec 2023 20:00)  HR: 87 (21 Dec 2023 12:00) (87 - 103)  BP: 137/91 (21 Dec 2023 12:00) (137/91 - 203/91)  BP(mean): 105 (21 Dec 2023 12:00) (96 - 130)  RR: 53 (21 Dec 2023 12:00) (16 - 53)  SpO2: 95% (21 Dec 2023 12:00) (92% - 99%)    Parameters below as of 21 Dec 2023 12:00  Patient On (Oxygen Delivery Method): nasal cannula  O2 Flow (L/min): 2      GENERAL:  [ ] No acute distress [ ]Lethargic  [ ]Unarousable  [x ]Verbal  [ ]Non-Verbal [ ]Cachexia    BEHAVIORAL/PSYCH:  [x ]Alert and Oriented x 4 [ ] Anxiety [ ] Delirium [ ] Agitation [ x] Calm   EYES: [x ] No scleral icterus [ ] Scleral icterus [ ] Closed  ENMT:  [ ]Dry mouth  [x ]No external oral lesions [ ] No external ear or nose lesions  CARDIOVASCULAR:  [ ]Regular [ ]Irregular [x ]Tachy [x ]Not Tachy  [ ]Lobo [ ] Edema [ ] No edema  PULMONARY:  [x ]Tachypnea  [ ]Audible excessive secretions [x ] No labored breathing [ ] labored breathing  GASTROINTESTINAL: [ ]Soft  [ ]Distended  [ x]Not distended [ ]Non tender [ ]Tender  MUSCULOSKELETAL: [ ]No clubbing [ ] clubbing  [x ] No cyanosis [ ] cyanosis  NEUROLOGIC: [ ]No focal deficits  [ x]Follows commands  [ ]Does not follow commands  [ ]Cognitive impairment  [ ]Dysphagia  [ ]Dysarthria  [ ]Paresis   SKIN: [ ] Jaundiced [x ] Non-jaundiced [ ]Rash [ ]No Rash [ ] Warm [ ] Dry  MISC/LINES: [x ] ET tube [ ] Trach [ ]NGT/OGT [ ]PEG [ ]Baker    LABS: reviewed by me                          12.8   16.37 )-----------( 183      ( 21 Dec 2023 04:29 )             39.4       12-21    140  |  101  |  11  ----------------------------<  104<H>  4.0   |  27  |  0.9    Ca    8.3<L>      21 Dec 2023 04:29  Phos  4.7     12-20  Mg     2.9     12-21    TPro  6.2  /  Alb  3.5  /  TBili  0.4  /  DBili  x   /  AST  36  /  ALT  54<H>  /  AlkPhos  109  12-20          ABG - ( 20 Dec 2023 14:42 )  pH, Arterial: 7.41  pH, Blood: x     /  pCO2: 46    /  pO2: 69    / HCO3: 29    / Base Excess: 3.6   /  SaO2: 95.1                Urinalysis Basic - ( 21 Dec 2023 04:29 )    Color: x / Appearance: x / SG: x / pH: x  Gluc: 104 mg/dL / Ketone: x  / Bili: x / Urobili: x   Blood: x / Protein: x / Nitrite: x   Leuk Esterase: x / RBC: x / WBC x   Sq Epi: x / Non Sq Epi: x / Bacteria: x        PT/INR - ( 19 Dec 2023 22:17 )   PT: 11.10 sec;   INR: 0.97 ratio         PTT - ( 19 Dec 2023 22:17 )  PTT:25.6 sec          CAPILLARY BLOOD GLUCOSE  118 (19 Dec 2023 22:21)      POCT Blood Glucose.: 118 mg/dL (19 Dec 2023 21:59)              RADIOLOGY & ADDITIONAL STUDIES: reviewed by me    < from: Xray Chest 1 View- PORTABLE-Routine (Xray Chest 1 View- PORTABLE-Routine in AM.) (12.21.23 @ 06:53) >  Impression:    Improved aeration to both lung fields with residual disease    < end of copied text >      EKG:     No recent EKG    PROTEIN CALORIE MALNUTRITION PRESENT: [ ]mild [ ]moderate [ ]severe [ ]underweight [ ]morbid obesity  https://www.andeal.org/vault/2440/web/files/ONC/Table_Clinical%20Characteristics%20to%20Document%20Malnutrition-White%20JV%20et%20al%202012.pdf    Height (cm): 160 (12-20-23 @ 10:00)  Weight (kg): 55.6 (12-20-23 @ 10:00)  BMI (kg/m2): 21.7 (12-20-23 @ 10:00)  [ ]PPSV2 < or = to 30% [ ]significant weight loss  [ ]poor nutritional intake  [ ]anasarca      [ ]Artificial Nutrition      Palliative Care Spiritual/Emotional Screening Tool Question  Severity (0-4):                    OR                    [ x] Unable to determine. Will assess at later time if appropriate.  Score of 2 or greater indicates recommendation of Chaplaincy and/or SW referral  Chaplaincy Referral: [ ] Yes [ ] Refused [ ] Following     Caregiver [place]:  [ ] Yes [ ] No    OR    [x ] Unable to determine. Will assess at later time if appropriate.  Social Work Referral [ ]  Patient and Family Centered Care Referral [ ]    Anticipatory Grief Present: [ ] Yes [ ] No    OR     [ x] Unable to determine. Will assess at later time if appropriate.  Social Work Referral [ ]  Patient and Family Centered Care Referral [ ]    Patient discussed with primary medical team MD  Palliative care education provided to patient and/or family   HPI:  62 yo F with PMHx of anxiety/MDD, Hypothyroidism and fibromyalgia BIBEMS to the hospital for AMS. Family called EMS because they found patient obtunded and altered at home today, they were concerned that patient may have overdosed on her sleeping pills. Family do describe that for the past 2 days patient has been having a cough, yesterday evening she started becoming progressively lethargic and thus he called EMS. Son denies any fever chills, seizure like activity fall or trauma while at home. Further history unable to be assessed at this moment.     Interval history  -Patient seen at bedside  -She is now extubated  -Endorses some SOB at time of visit     ADVANCE DIRECTIVES:     [x ] Full Code [ ] DNR  MOLST  [ ]  Living Will  [ ]   DECISION MAKER(s):  [ ] Health Care Proxy(s)  [x ] Surrogate(s)  [ ] Guardian           Name(s): Phone Number(s): Rylan      BASELINE (I)ADL(s) (prior to admission):    [place]: [ ]Total  [ ] Moderate [ ]Dependent  Palliative Performance Status Version 2:         %    http://npcrc.org/files/news/palliative_performance_scale_ppsv2.pdf    Allergies    No Known Allergies    Intolerances    MEDICATIONS  (STANDING):  amitriptyline 25 milliGRAM(s) Oral at bedtime  azithromycin  IVPB 500 milliGRAM(s) IV Intermittent every 24 hours  cefTRIAXone   IVPB 1000 milliGRAM(s) IV Intermittent every 24 hours  chlorhexidine 2% Cloths 1 Application(s) Topical <User Schedule>  enoxaparin Injectable 40 milliGRAM(s) SubCutaneous every 24 hours  escitalopram 10 milliGRAM(s) Oral daily  metoprolol tartrate 25 milliGRAM(s) Oral two times a day  oseltamivir 75 milliGRAM(s) Oral two times a day  pantoprazole    Tablet 40 milliGRAM(s) Oral before breakfast    MEDICATIONS  (PRN):  acetaminophen     Tablet .. 975 milliGRAM(s) Oral every 8 hours PRN Temp greater or equal to 38.5C (101.3F)  albuterol/ipratropium for Nebulization 3 milliLiter(s) Nebulizer every 6 hours PRN Shortness of Breath and/or Wheezing  benzonatate 100 milliGRAM(s) Oral three times a day PRN Cough      PRESENT SYMPTOMS: [ ]Unable to obtain due to poor mentation  Source if other than patient:  [ ]Family   [ ]Team     Pain: [ ]yes [x ]no  QOL impact -   Location -               Aggravating factors -   Quality -  Radiation -   Timing-   Severity (0-10 scale):   Minimal acceptable level (0-10 scale):     CPOT:    https://www.[facility].org/getattachment/[email or web address]/Critical-Care-Pain-Observation-Tool-(CPOT)    PAIN AD Score:   http://geriatrictoolkit.[facility]/cog/painad.pdf (press ctrl +  left click to view)    Dyspnea:                           [ ]None[ ]Mild [x ]Moderate [ ]Severe     Respiratory Distress Observation Scale (RDOS):   A score of 0 to 2 signifies little or no respiratory distress, 3 signifies mild distress, scores 4 to 6 indicate moderate distress, and scores greater than 7 signify severe distress  https://www.[facility].ca/sites/default/files/PDFS/602308-ofgnyoyhdco-lmnhdodf-uoxbknnpais-xktao.pdf    Anxiety:                             [x ]None[ ]Mild [ ]Moderate [ ]Severe   Fatigue:                             [ ]None[ x]Mild [ ]Moderate [ ]Severe   Nausea:                             [ x]None[ ]Mild [ ]Moderate [ ]Severe   Loss of appetite:              [x ]None[ ]Mild [ ]Moderate [ ]Severe   Constipation:                    [ x]None[ ]Mild [ ]Moderate [ ]Severe    Other Symptoms:  [x ]All other review of systems negative     Palliative Performance Status Version 2:         30%    http://[facility].org/files/news/palliative_performance_scale_ppsv2.pdf    PHYSICAL EXAM:  Vital Signs Last 24 Hrs  T(C): 37.3 (21 Dec 2023 08:00), Max: 37.3 (20 Dec 2023 20:00)  T(F): 99.1 (21 Dec 2023 08:00), Max: 99.2 (20 Dec 2023 20:00)  HR: 87 (21 Dec 2023 12:00) (87 - 103)  BP: 137/91 (21 Dec 2023 12:00) (137/91 - 203/91)  BP(mean): 105 (21 Dec 2023 12:00) (96 - 130)  RR: 53 (21 Dec 2023 12:00) (16 - 53)  SpO2: 95% (21 Dec 2023 12:00) (92% - 99%)    Parameters below as of 21 Dec 2023 12:00  Patient On (Oxygen Delivery Method): nasal cannula  O2 Flow (L/min): 2      GENERAL:  [ ] No acute distress [ ]Lethargic  [ ]Unarousable  [x ]Verbal  [ ]Non-Verbal [ ]Cachexia    BEHAVIORAL/PSYCH:  [x ]Alert and Oriented x 4 [ ] Anxiety [ ] Delirium [ ] Agitation [ x] Calm   EYES: [x ] No scleral icterus [ ] Scleral icterus [ ] Closed  ENMT:  [ ]Dry mouth  [x ]No external oral lesions [ ] No external ear or nose lesions  CARDIOVASCULAR:  [ ]Regular [ ]Irregular [x ]Tachy [x ]Not Tachy  [ ]Lobo [ ] Edema [ ] No edema  PULMONARY:  [x ]Tachypnea  [ ]Audible excessive secretions [x ] No labored breathing [ ] labored breathing  GASTROINTESTINAL: [ ]Soft  [ ]Distended  [ x]Not distended [ ]Non tender [ ]Tender  MUSCULOSKELETAL: [ ]No clubbing [ ] clubbing  [x ] No cyanosis [ ] cyanosis  NEUROLOGIC: [ ]No focal deficits  [ x]Follows commands  [ ]Does not follow commands  [ ]Cognitive impairment  [ ]Dysphagia  [ ]Dysarthria  [ ]Paresis   SKIN: [ ] Jaundiced [x ] Non-jaundiced [ ]Rash [ ]No Rash [ ] Warm [ ] Dry  MISC/LINES: [x ] ET tube [ ] Trach [ ]NGT/OGT [ ]PEG [ ]Baker    LABS: reviewed by me                          12.8   16.37 )-----------( 183      ( 21 Dec 2023 04:29 )             39.4       12-21    140  |  101  |  11  ----------------------------<  104<H>  4.0   |  27  |  0.9    Ca    8.3<L>      21 Dec 2023 04:29  Phos  4.7     12-20  Mg     2.9     12-21    TPro  6.2  /  Alb  3.5  /  TBili  0.4  /  DBili  x   /  AST  36  /  ALT  54<H>  /  AlkPhos  109  12-20          ABG - ( 20 Dec 2023 14:42 )  pH, Arterial: 7.41  pH, Blood: x     /  pCO2: 46    /  pO2: 69    / HCO3: 29    / Base Excess: 3.6   /  SaO2: 95.1                Urinalysis Basic - ( 21 Dec 2023 04:29 )    Color: x / Appearance: x / SG: x / pH: x  Gluc: 104 mg/dL / Ketone: x  / Bili: x / Urobili: x   Blood: x / Protein: x / Nitrite: x   Leuk Esterase: x / RBC: x / WBC x   Sq Epi: x / Non Sq Epi: x / Bacteria: x        PT/INR - ( 19 Dec 2023 22:17 )   PT: 11.10 sec;   INR: 0.97 ratio         PTT - ( 19 Dec 2023 22:17 )  PTT:25.6 sec          CAPILLARY BLOOD GLUCOSE  118 (19 Dec 2023 22:21)      POCT Blood Glucose.: 118 mg/dL (19 Dec 2023 21:59)              RADIOLOGY & ADDITIONAL STUDIES: reviewed by me    < from: Xray Chest 1 View- PORTABLE-Routine (Xray Chest 1 View- PORTABLE-Routine in AM.) (12.21.23 @ 06:53) >  Impression:    Improved aeration to both lung fields with residual disease    < end of copied text >      EKG:     No recent EKG    PROTEIN CALORIE MALNUTRITION PRESENT: [ ]mild [ ]moderate [ ]severe [ ]underweight [ ]morbid obesity  https://www.andeal.org/vault/2440/web/files/ONC/Table_Clinical%20Characteristics%20to%20Document%20Malnutrition-White%20JV%20et%20al%202012.pdf    Height (cm): 160 (12-20-23 @ 10:00)  Weight (kg): 55.6 (12-20-23 @ 10:00)  BMI (kg/m2): 21.7 (12-20-23 @ 10:00)  [ ]PPSV2 < or = to 30% [ ]significant weight loss  [ ]poor nutritional intake  [ ]anasarca      [ ]Artificial Nutrition      Palliative Care Spiritual/Emotional Screening Tool Question  Severity (0-4):                    OR                    [ x] Unable to determine. Will assess at later time if appropriate.  Score of 2 or greater indicates recommendation of Chaplaincy and/or SW referral  Chaplaincy Referral: [ ] Yes [ ] Refused [ ] Following     Caregiver [place]:  [ ] Yes [ ] No    OR    [x ] Unable to determine. Will assess at later time if appropriate.  Social Work Referral [ ]  Patient and Family Centered Care Referral [ ]    Anticipatory Grief Present: [ ] Yes [ ] No    OR     [ x] Unable to determine. Will assess at later time if appropriate.  Social Work Referral [ ]  Patient and Family Centered Care Referral [ ]    Patient discussed with primary medical team MD  Palliative care education provided to patient and/or family

## 2023-12-21 NOTE — PROGRESS NOTE ADULT - PROBLEM SELECTOR PLAN 1
In the setting of AMS, influenza A and possible superimposed bacterial infection  -patient now medically extubated with plan for downgrade  -azithromycin  IVPB 500 milliGRAM(s) IV Intermittent every 24 hours  -cefTRIAXone   IVPB 1000 milliGRAM(s) IV Intermittent every 24 hours  -oseltamivir 75 milliGRAM(s) Oral two times a day

## 2023-12-21 NOTE — PROGRESS NOTE ADULT - ATTENDING COMMENTS
IMPRESSION:    Acute Hypoxemic Respiratory Failure SP Intubation 12/19, Extubated 12/20   Influenza A Pneumonia   Possible superimposed bacterial infection   Toxic Metabolic Encephalopathy, resolved   Left cerebellar venous anomaly    HO Fibromyalgia   HO Hypothyroidism   HO Anxiety     Plan as outlined above

## 2023-12-21 NOTE — PROGRESS NOTE ADULT - ASSESSMENT
IMPRESSION:    Acute Hypoxemic Respiratory Failure SP Intubation 12/19, Extubated 12/20   Influenza A Pneumonia   Possible superimposed bacterial infection   Toxic Metabolic Encephalopathy, resolved   Left cerebellar venous anomaly    HO Fibromyalgia   HO Hypothyroidism   HO Anxiety       PLAN:    CNS: CTH unremarkable, drug/urine toxicology noted, Neuroendovascular recommendations noted, f/u MRI brain     HEENT: Oral care.     PULMONARY:  HOB @ 45 degrees.  Aspiration precautions. Anti-tussive, Chest PT, sputum culture, incentive spirometry, wean oxygen, target SAaO2 92-96%, Nebs Q6H and PRN     CARDIOVASCULAR: 2D-Echo EF 63%, not on pressors, troponin negative, start metoprolol 25mg BID     GI: GI prophylaxis.  Feeding as tolerated. Bowel regimen     RENAL:  Follow up lytes.  Correct as needed    INFECTIOUS DISEASE: Follow up cultures, Tamiflu, Ceftriaxone and azithromycin. procalcitonin 0.54, nasal MRSA negative.  DC Vanc, urine legionella and strep Ag     HEMATOLOGICAL:  DVT prophylaxis.  Dimer 2K, f/u LE duplex      ENDOCRINE:  Follow up FS.  Insulin protocol if needed.    MUSCULOSKELETAL: activity as tolerated, PT/OT    DC Baker/TOV    Goals of care    Downgrade to Floor

## 2023-12-21 NOTE — PHYSICAL THERAPY INITIAL EVALUATION ADULT - PERTINENT HX OF CURRENT PROBLEM, REHAB EVAL
63y old  Female who presents with acute Hypoxemic Respiratory Failure SP Intubation 12/19, Extubated 12/20.

## 2023-12-21 NOTE — PHYSICAL THERAPY INITIAL EVALUATION ADULT - GENERAL OBSERVATIONS, REHAB EVAL
Pt seen from [id number]. Pt encountered in the bed, + O2 via NC 2l/min, BP cuff, tele, pulse oxi, b/l LE sequentials, agreeable for b/s PT. Pt seen from [id number]. Pt encountered in the bed, + O2 via NC 2l/min, BP cuff, tele, pulse oxi, b/l LE sequentials, agreeable for b/s PT.

## 2023-12-21 NOTE — PROGRESS NOTE ADULT - CONVERSATION DETAILS
Spoke with patient at bedside.  She endorsed some SOB, but otherwise noted she was feeling better. Palliative care introduced. She knew she had the flu, but didn't know the rest of her hospital course.  Medical update provided.  We discussed code status, including DNR/DNI.  She noted that she may wish for DNR/DNI for the future, but wishes to maintain Full code now and speak with her  when she gets home. All questions answered.

## 2023-12-21 NOTE — PROGRESS NOTE ADULT - PROBLEM SELECTOR PLAN 2
Resolved. Likely in setting of respiratory failure/flu above. Patient also with venous anomaly on imaging.  -f/u MRI if done  -f/u neuro.

## 2023-12-21 NOTE — PHARMACOTHERAPY INTERVENTION NOTE - INTERVENTION TYPE RECOOMEND
Therapy Recommended - Additional therapy
Therapy Recommended - Additional therapy
Timing/Frequency of Administration Recommended

## 2023-12-22 LAB
ANION GAP SERPL CALC-SCNC: 16 MMOL/L — HIGH (ref 7–14)
ANION GAP SERPL CALC-SCNC: 16 MMOL/L — HIGH (ref 7–14)
BASOPHILS # BLD AUTO: 0.01 K/UL — SIGNIFICANT CHANGE UP (ref 0–0.2)
BASOPHILS # BLD AUTO: 0.01 K/UL — SIGNIFICANT CHANGE UP (ref 0–0.2)
BASOPHILS NFR BLD AUTO: 0.1 % — SIGNIFICANT CHANGE UP (ref 0–1)
BASOPHILS NFR BLD AUTO: 0.1 % — SIGNIFICANT CHANGE UP (ref 0–1)
BUN SERPL-MCNC: 12 MG/DL — SIGNIFICANT CHANGE UP (ref 10–20)
BUN SERPL-MCNC: 12 MG/DL — SIGNIFICANT CHANGE UP (ref 10–20)
CALCIUM SERPL-MCNC: 9 MG/DL — SIGNIFICANT CHANGE UP (ref 8.4–10.5)
CALCIUM SERPL-MCNC: 9 MG/DL — SIGNIFICANT CHANGE UP (ref 8.4–10.5)
CHLORIDE SERPL-SCNC: 99 MMOL/L — SIGNIFICANT CHANGE UP (ref 98–110)
CHLORIDE SERPL-SCNC: 99 MMOL/L — SIGNIFICANT CHANGE UP (ref 98–110)
CO2 SERPL-SCNC: 24 MMOL/L — SIGNIFICANT CHANGE UP (ref 17–32)
CO2 SERPL-SCNC: 24 MMOL/L — SIGNIFICANT CHANGE UP (ref 17–32)
CREAT SERPL-MCNC: 0.8 MG/DL — SIGNIFICANT CHANGE UP (ref 0.7–1.5)
CREAT SERPL-MCNC: 0.8 MG/DL — SIGNIFICANT CHANGE UP (ref 0.7–1.5)
CULTURE RESULTS: ABNORMAL
CULTURE RESULTS: ABNORMAL
EGFR: 83 ML/MIN/1.73M2 — SIGNIFICANT CHANGE UP
EGFR: 83 ML/MIN/1.73M2 — SIGNIFICANT CHANGE UP
EOSINOPHIL # BLD AUTO: 0 K/UL — SIGNIFICANT CHANGE UP (ref 0–0.7)
EOSINOPHIL # BLD AUTO: 0 K/UL — SIGNIFICANT CHANGE UP (ref 0–0.7)
EOSINOPHIL NFR BLD AUTO: 0 % — SIGNIFICANT CHANGE UP (ref 0–8)
EOSINOPHIL NFR BLD AUTO: 0 % — SIGNIFICANT CHANGE UP (ref 0–8)
GLUCOSE SERPL-MCNC: 123 MG/DL — HIGH (ref 70–99)
GLUCOSE SERPL-MCNC: 123 MG/DL — HIGH (ref 70–99)
HCT VFR BLD CALC: 37.1 % — SIGNIFICANT CHANGE UP (ref 37–47)
HCT VFR BLD CALC: 37.1 % — SIGNIFICANT CHANGE UP (ref 37–47)
HGB BLD-MCNC: 12 G/DL — SIGNIFICANT CHANGE UP (ref 12–16)
HGB BLD-MCNC: 12 G/DL — SIGNIFICANT CHANGE UP (ref 12–16)
IMM GRANULOCYTES NFR BLD AUTO: 0.5 % — HIGH (ref 0.1–0.3)
IMM GRANULOCYTES NFR BLD AUTO: 0.5 % — HIGH (ref 0.1–0.3)
LYMPHOCYTES # BLD AUTO: 1.31 K/UL — SIGNIFICANT CHANGE UP (ref 1.2–3.4)
LYMPHOCYTES # BLD AUTO: 1.31 K/UL — SIGNIFICANT CHANGE UP (ref 1.2–3.4)
LYMPHOCYTES # BLD AUTO: 8.7 % — LOW (ref 20.5–51.1)
LYMPHOCYTES # BLD AUTO: 8.7 % — LOW (ref 20.5–51.1)
MAGNESIUM SERPL-MCNC: 2.3 MG/DL — SIGNIFICANT CHANGE UP (ref 1.8–2.4)
MAGNESIUM SERPL-MCNC: 2.3 MG/DL — SIGNIFICANT CHANGE UP (ref 1.8–2.4)
MCHC RBC-ENTMCNC: 26.5 PG — LOW (ref 27–31)
MCHC RBC-ENTMCNC: 26.5 PG — LOW (ref 27–31)
MCHC RBC-ENTMCNC: 32.3 G/DL — SIGNIFICANT CHANGE UP (ref 32–37)
MCHC RBC-ENTMCNC: 32.3 G/DL — SIGNIFICANT CHANGE UP (ref 32–37)
MCV RBC AUTO: 82.1 FL — SIGNIFICANT CHANGE UP (ref 81–99)
MCV RBC AUTO: 82.1 FL — SIGNIFICANT CHANGE UP (ref 81–99)
MONOCYTES # BLD AUTO: 0.64 K/UL — HIGH (ref 0.1–0.6)
MONOCYTES # BLD AUTO: 0.64 K/UL — HIGH (ref 0.1–0.6)
MONOCYTES NFR BLD AUTO: 4.2 % — SIGNIFICANT CHANGE UP (ref 1.7–9.3)
MONOCYTES NFR BLD AUTO: 4.2 % — SIGNIFICANT CHANGE UP (ref 1.7–9.3)
NEUTROPHILS # BLD AUTO: 13.06 K/UL — HIGH (ref 1.4–6.5)
NEUTROPHILS # BLD AUTO: 13.06 K/UL — HIGH (ref 1.4–6.5)
NEUTROPHILS NFR BLD AUTO: 86.5 % — HIGH (ref 42.2–75.2)
NEUTROPHILS NFR BLD AUTO: 86.5 % — HIGH (ref 42.2–75.2)
NRBC # BLD: 0 /100 WBCS — SIGNIFICANT CHANGE UP (ref 0–0)
NRBC # BLD: 0 /100 WBCS — SIGNIFICANT CHANGE UP (ref 0–0)
PLATELET # BLD AUTO: 205 K/UL — SIGNIFICANT CHANGE UP (ref 130–400)
PLATELET # BLD AUTO: 205 K/UL — SIGNIFICANT CHANGE UP (ref 130–400)
PMV BLD: 11.2 FL — HIGH (ref 7.4–10.4)
PMV BLD: 11.2 FL — HIGH (ref 7.4–10.4)
POTASSIUM SERPL-MCNC: 4.2 MMOL/L — SIGNIFICANT CHANGE UP (ref 3.5–5)
POTASSIUM SERPL-MCNC: 4.2 MMOL/L — SIGNIFICANT CHANGE UP (ref 3.5–5)
POTASSIUM SERPL-SCNC: 4.2 MMOL/L — SIGNIFICANT CHANGE UP (ref 3.5–5)
POTASSIUM SERPL-SCNC: 4.2 MMOL/L — SIGNIFICANT CHANGE UP (ref 3.5–5)
RBC # BLD: 4.52 M/UL — SIGNIFICANT CHANGE UP (ref 4.2–5.4)
RBC # BLD: 4.52 M/UL — SIGNIFICANT CHANGE UP (ref 4.2–5.4)
RBC # FLD: 14.7 % — HIGH (ref 11.5–14.5)
RBC # FLD: 14.7 % — HIGH (ref 11.5–14.5)
SODIUM SERPL-SCNC: 139 MMOL/L — SIGNIFICANT CHANGE UP (ref 135–146)
SODIUM SERPL-SCNC: 139 MMOL/L — SIGNIFICANT CHANGE UP (ref 135–146)
SPECIMEN SOURCE: SIGNIFICANT CHANGE UP
SPECIMEN SOURCE: SIGNIFICANT CHANGE UP
WBC # BLD: 15.09 K/UL — HIGH (ref 4.8–10.8)
WBC # BLD: 15.09 K/UL — HIGH (ref 4.8–10.8)
WBC # FLD AUTO: 15.09 K/UL — HIGH (ref 4.8–10.8)
WBC # FLD AUTO: 15.09 K/UL — HIGH (ref 4.8–10.8)

## 2023-12-22 PROCEDURE — 99233 SBSQ HOSP IP/OBS HIGH 50: CPT

## 2023-12-22 RX ORDER — PETROLATUM,WHITE
1 JELLY (GRAM) TOPICAL EVERY 12 HOURS
Refills: 0 | Status: DISCONTINUED | OUTPATIENT
Start: 2023-12-22 | End: 2023-12-29

## 2023-12-22 RX ORDER — MORPHINE SULFATE 50 MG/1
2 CAPSULE, EXTENDED RELEASE ORAL EVERY 6 HOURS
Refills: 0 | Status: DISCONTINUED | OUTPATIENT
Start: 2023-12-22 | End: 2023-12-24

## 2023-12-22 RX ORDER — HYDRALAZINE HCL 50 MG
10 TABLET ORAL ONCE
Refills: 0 | Status: COMPLETED | OUTPATIENT
Start: 2023-12-22 | End: 2023-12-22

## 2023-12-22 RX ORDER — CYCLOBENZAPRINE HYDROCHLORIDE 10 MG/1
5 TABLET, FILM COATED ORAL ONCE
Refills: 0 | Status: COMPLETED | OUTPATIENT
Start: 2023-12-22 | End: 2023-12-22

## 2023-12-22 RX ORDER — METOPROLOL TARTRATE 50 MG
25 TABLET ORAL EVERY 8 HOURS
Refills: 0 | Status: DISCONTINUED | OUTPATIENT
Start: 2023-12-22 | End: 2023-12-29

## 2023-12-22 RX ORDER — ZOLPIDEM TARTRATE 10 MG/1
5 TABLET ORAL AT BEDTIME
Refills: 0 | Status: DISCONTINUED | OUTPATIENT
Start: 2023-12-22 | End: 2023-12-25

## 2023-12-22 RX ORDER — AMLODIPINE BESYLATE 2.5 MG/1
5 TABLET ORAL DAILY
Refills: 0 | Status: DISCONTINUED | OUTPATIENT
Start: 2023-12-22 | End: 2023-12-23

## 2023-12-22 RX ADMIN — CYCLOBENZAPRINE HYDROCHLORIDE 5 MILLIGRAM(S): 10 TABLET, FILM COATED ORAL at 01:18

## 2023-12-22 RX ADMIN — ESCITALOPRAM OXALATE 10 MILLIGRAM(S): 10 TABLET, FILM COATED ORAL at 12:33

## 2023-12-22 RX ADMIN — CEFTRIAXONE 100 MILLIGRAM(S): 500 INJECTION, POWDER, FOR SOLUTION INTRAMUSCULAR; INTRAVENOUS at 09:49

## 2023-12-22 RX ADMIN — Medication 3 MILLILITER(S): at 09:18

## 2023-12-22 RX ADMIN — Medication 25 MILLIGRAM(S): at 21:53

## 2023-12-22 RX ADMIN — MORPHINE SULFATE 2 MILLIGRAM(S): 50 CAPSULE, EXTENDED RELEASE ORAL at 12:35

## 2023-12-22 RX ADMIN — Medication 25 MILLIGRAM(S): at 21:52

## 2023-12-22 RX ADMIN — PANTOPRAZOLE SODIUM 40 MILLIGRAM(S): 20 TABLET, DELAYED RELEASE ORAL at 06:36

## 2023-12-22 RX ADMIN — Medication 75 MILLIGRAM(S): at 16:37

## 2023-12-22 RX ADMIN — Medication 10 MILLIGRAM(S): at 18:13

## 2023-12-22 RX ADMIN — MORPHINE SULFATE 2 MILLIGRAM(S): 50 CAPSULE, EXTENDED RELEASE ORAL at 18:40

## 2023-12-22 RX ADMIN — ENOXAPARIN SODIUM 40 MILLIGRAM(S): 100 INJECTION SUBCUTANEOUS at 09:49

## 2023-12-22 RX ADMIN — CHLORHEXIDINE GLUCONATE 1 APPLICATION(S): 213 SOLUTION TOPICAL at 06:35

## 2023-12-22 RX ADMIN — MORPHINE SULFATE 2 MILLIGRAM(S): 50 CAPSULE, EXTENDED RELEASE ORAL at 12:50

## 2023-12-22 RX ADMIN — Medication 3 MILLILITER(S): at 13:52

## 2023-12-22 RX ADMIN — Medication 3 MILLILITER(S): at 19:26

## 2023-12-22 RX ADMIN — AMLODIPINE BESYLATE 5 MILLIGRAM(S): 2.5 TABLET ORAL at 10:26

## 2023-12-22 RX ADMIN — MORPHINE SULFATE 2 MILLIGRAM(S): 50 CAPSULE, EXTENDED RELEASE ORAL at 18:55

## 2023-12-22 RX ADMIN — Medication 25 MILLIGRAM(S): at 12:33

## 2023-12-22 RX ADMIN — Medication 75 MILLIGRAM(S): at 06:36

## 2023-12-22 RX ADMIN — Medication 25 MILLIGRAM(S): at 06:36

## 2023-12-22 RX ADMIN — Medication 3 MILLILITER(S): at 13:04

## 2023-12-22 NOTE — PROGRESS NOTE ADULT - SUBJECTIVE AND OBJECTIVE BOX
Patient is a 63y old  Female who presents with a chief complaint of AHRF (21 Dec 2023 10:41)        Over Night Events: hypertensive to 170s systolic overnight, comfortable 2L NC sating 100%, no other acute events        Vital Signs Last 24 Hrs  T(C): 36.1 (22 Dec 2023 08:00), Max: 37.2 (21 Dec 2023 20:00)  T(F): 97 (22 Dec 2023 08:00), Max: 98.9 (21 Dec 2023 20:00)  HR: 92 (22 Dec 2023 09:00) (84 - 102)  BP: 173/77 (22 Dec 2023 09:00) (137/91 - 203/98)  BP(mean): 114 (22 Dec 2023 09:00) (97 - 141)  RR: 25 (22 Dec 2023 09:00) (20 - 27)  SpO2: 98% (22 Dec 2023 09:00) (94% - 99%)    O2 Parameters below as of 22 Dec 2023 09:00  Patient On (Oxygen Delivery Method): nasal cannula  O2 Flow (L/min): 2          CONSTITUTIONAL:  NAD    ENT:   Airway patent,   Mouth with normal mucosa.   No thrush    EYES:   Pupils equal,   Round and reactive to light.    CARDIAC:   Normal rate,   Regular rhythm.        RESPIRATORY:   No wheezing  Bilateral BS  Normal chest expansion  Not tachypneic,  No use of accessory muscles    GASTROINTESTINAL:  Abdomen soft,   Non-tender,   No guarding,   + BS        MUSCULOSKELETAL:   Range of motion is not limited,      NEUROLOGICAL:   Alert and oriented x3      SKIN:   Skin normal color for race,   Warm and dry      PSYCHIATRIC:   No apparent risk to self or others.        12-21-23 @ 07:01  -  12-22-23 @ 07:00  --------------------------------------------------------  IN:    IV PiggyBack: 300 mL    Oral Fluid: 100 mL  Total IN: 400 mL    OUT:    Indwelling Catheter - Urethral (mL): 325 mL    Voided (mL): 1700 mL  Total OUT: 2025 mL    Total NET: -1625 mL      12-22-23 @ 07:01  -  12-22-23 @ 09:50  --------------------------------------------------------  IN:    Oral Fluid: 30 mL  Total IN: 30 mL    OUT:    Voided (mL): 300 mL  Total OUT: 300 mL    Total NET: -270 mL          LABS:                            12.0   15.09 )-----------( 205      ( 22 Dec 2023 04:14 )             37.1                                               12-22    139  |  99  |  12  ----------------------------<  123<H>  4.2   |  24  |  0.8    Ca    9.0      22 Dec 2023 04:14  Phos  4.7     12-20  Mg     2.3     12-22    TPro  6.2  /  Alb  3.5  /  TBili  0.4  /  DBili  x   /  AST  36  /  ALT  54<H>  /  AlkPhos  109  12-20                                             Urinalysis Basic - ( 22 Dec 2023 04:14 )    Color: x / Appearance: x / SG: x / pH: x  Gluc: 123 mg/dL / Ketone: x  / Bili: x / Urobili: x   Blood: x / Protein: x / Nitrite: x   Leuk Esterase: x / RBC: x / WBC x   Sq Epi: x / Non Sq Epi: x / Bacteria: x                                                  LIVER FUNCTIONS - ( 20 Dec 2023 16:28 )  Alb: 3.5 g/dL / Pro: 6.2 g/dL / ALK PHOS: 109 U/L / ALT: 54 U/L / AST: 36 U/L / GGT: x                                                  Culture - Sputum (collected 20 Dec 2023 10:32)  Source: Trach Asp Tracheal Aspirate  Gram Stain (21 Dec 2023 02:38):    Numerous polymorphonuclear leukocytes per low power field    No Squamous epithelial cells per low power field    Rare Gram positive cocci in pairs seen per oil power field  Final Report (22 Dec 2023 08:28):    Normal Respiratory Diana present    Culture - Urine (collected 20 Dec 2023 00:05)  Source: Catheterized Catheterized  Final Report (21 Dec 2023 07:43):    No growth    Culture - Blood (collected 19 Dec 2023 22:17)  Source: .Blood Blood  Preliminary Report (22 Dec 2023 07:01):    No growth at 48 Hours    Culture - Blood (collected 19 Dec 2023 22:15)  Source: .Blood Blood  Preliminary Report (22 Dec 2023 07:01):    No growth at 48 Hours                                                                                       ABG - ( 20 Dec 2023 14:42 )  pH, Arterial: 7.41  pH, Blood: x     /  pCO2: 46    /  pO2: 69    / HCO3: 29    / Base Excess: 3.6   /  SaO2: 95.1                MEDICATIONS  (STANDING):  albuterol/ipratropium for Nebulization 3 milliLiter(s) Nebulizer every 6 hours  amitriptyline 25 milliGRAM(s) Oral at bedtime  azithromycin  IVPB 500 milliGRAM(s) IV Intermittent every 24 hours  cefTRIAXone   IVPB 1000 milliGRAM(s) IV Intermittent every 24 hours  chlorhexidine 2% Cloths 1 Application(s) Topical <User Schedule>  enoxaparin Injectable 40 milliGRAM(s) SubCutaneous every 24 hours  escitalopram 10 milliGRAM(s) Oral daily  metoprolol tartrate 25 milliGRAM(s) Oral two times a day  oseltamivir 75 milliGRAM(s) Oral two times a day  pantoprazole    Tablet 40 milliGRAM(s) Oral before breakfast    MEDICATIONS  (PRN):  acetaminophen     Tablet .. 975 milliGRAM(s) Oral every 8 hours PRN Temp greater or equal to 38.5C (101.3F)  albuterol/ipratropium for Nebulization 3 milliLiter(s) Nebulizer every 6 hours PRN Shortness of Breath and/or Wheezing  benzonatate 100 milliGRAM(s) Oral three times a day PRN Cough       Patient is a 63y old  Female who presents with a chief complaint of AHRF (21 Dec 2023 10:41)        Over Night Events: hypertensive to 170s systolic overnight, comfortable 2L NC sating 100%, no other acute events        Vital Signs Last 24 Hrs  T(C): 36.1 (22 Dec 2023 08:00), Max: 37.2 (21 Dec 2023 20:00)  T(F): 97 (22 Dec 2023 08:00), Max: 98.9 (21 Dec 2023 20:00)  HR: 92 (22 Dec 2023 09:00) (84 - 102)  BP: 173/77 (22 Dec 2023 09:00) (137/91 - 203/98)  BP(mean): 114 (22 Dec 2023 09:00) (97 - 141)  RR: 25 (22 Dec 2023 09:00) (20 - 27)  SpO2: 98% (22 Dec 2023 09:00) (94% - 99%)    O2 Parameters below as of 22 Dec 2023 09:00  Patient On (Oxygen Delivery Method): nasal cannula  O2 Flow (L/min): 2          CONSTITUTIONAL:  NAD    ENT:   Airway patent,   Mouth with normal mucosa.   No thrush    EYES:   Pupils equal,   Round and reactive to light.    CARDIAC:   Normal rate,   Regular rhythm.        RESPIRATORY:   No wheezing  Bilateral BS  Normal chest expansion  Not tachypneic,  No use of accessory muscles    GASTROINTESTINAL:  Abdomen soft,   Non-tender,   No guarding,   + BS        MUSCULOSKELETAL:   Range of motion is not limited,      NEUROLOGICAL:   Alert and oriented x3      SKIN:   Skin normal color for race,   Warm and dry      PSYCHIATRIC:   No apparent risk to self or others.        12-21-23 @ 07:01  -  12-22-23 @ 07:00  --------------------------------------------------------  IN:    IV PiggyBack: 300 mL    Oral Fluid: 100 mL  Total IN: 400 mL    OUT:    Indwelling Catheter - Urethral (mL): 325 mL    Voided (mL): 1700 mL  Total OUT: 2025 mL    Total NET: -1625 mL      12-22-23 @ 07:01  -  12-22-23 @ 09:50  --------------------------------------------------------  IN:    Oral Fluid: 30 mL  Total IN: 30 mL    OUT:    Voided (mL): 300 mL  Total OUT: 300 mL    Total NET: -270 mL          LABS:                            12.0   15.09 )-----------( 205      ( 22 Dec 2023 04:14 )             37.1                                               12-22    139  |  99  |  12  ----------------------------<  123<H>  4.2   |  24  |  0.8    Ca    9.0      22 Dec 2023 04:14  Phos  4.7     12-20  Mg     2.3     12-22    TPro  6.2  /  Alb  3.5  /  TBili  0.4  /  DBili  x   /  AST  36  /  ALT  54<H>  /  AlkPhos  109  12-20                                             Urinalysis Basic - ( 22 Dec 2023 04:14 )    Color: x / Appearance: x / SG: x / pH: x  Gluc: 123 mg/dL / Ketone: x  / Bili: x / Urobili: x   Blood: x / Protein: x / Nitrite: x   Leuk Esterase: x / RBC: x / WBC x   Sq Epi: x / Non Sq Epi: x / Bacteria: x                                                  LIVER FUNCTIONS - ( 20 Dec 2023 16:28 )  Alb: 3.5 g/dL / Pro: 6.2 g/dL / ALK PHOS: 109 U/L / ALT: 54 U/L / AST: 36 U/L / GGT: x                                                  Culture - Sputum (collected 20 Dec 2023 10:32)  Source: Trach Asp Tracheal Aspirate  Gram Stain (21 Dec 2023 02:38):    Numerous polymorphonuclear leukocytes per low power field    No Squamous epithelial cells per low power field    Rare Gram positive cocci in pairs seen per oil power field  Final Report (22 Dec 2023 08:28):    Normal Respiratory Dinaa present    Culture - Urine (collected 20 Dec 2023 00:05)  Source: Catheterized Catheterized  Final Report (21 Dec 2023 07:43):    No growth    Culture - Blood (collected 19 Dec 2023 22:17)  Source: .Blood Blood  Preliminary Report (22 Dec 2023 07:01):    No growth at 48 Hours    Culture - Blood (collected 19 Dec 2023 22:15)  Source: .Blood Blood  Preliminary Report (22 Dec 2023 07:01):    No growth at 48 Hours                                                                                       ABG - ( 20 Dec 2023 14:42 )  pH, Arterial: 7.41  pH, Blood: x     /  pCO2: 46    /  pO2: 69    / HCO3: 29    / Base Excess: 3.6   /  SaO2: 95.1                MEDICATIONS  (STANDING):  albuterol/ipratropium for Nebulization 3 milliLiter(s) Nebulizer every 6 hours  amitriptyline 25 milliGRAM(s) Oral at bedtime  azithromycin  IVPB 500 milliGRAM(s) IV Intermittent every 24 hours  cefTRIAXone   IVPB 1000 milliGRAM(s) IV Intermittent every 24 hours  chlorhexidine 2% Cloths 1 Application(s) Topical <User Schedule>  enoxaparin Injectable 40 milliGRAM(s) SubCutaneous every 24 hours  escitalopram 10 milliGRAM(s) Oral daily  metoprolol tartrate 25 milliGRAM(s) Oral two times a day  oseltamivir 75 milliGRAM(s) Oral two times a day  pantoprazole    Tablet 40 milliGRAM(s) Oral before breakfast    MEDICATIONS  (PRN):  acetaminophen     Tablet .. 975 milliGRAM(s) Oral every 8 hours PRN Temp greater or equal to 38.5C (101.3F)  albuterol/ipratropium for Nebulization 3 milliLiter(s) Nebulizer every 6 hours PRN Shortness of Breath and/or Wheezing  benzonatate 100 milliGRAM(s) Oral three times a day PRN Cough

## 2023-12-22 NOTE — PROGRESS NOTE ADULT - SUBJECTIVE AND OBJECTIVE BOX
Neuroendovascular Progress Note:     HPI:  Patient is a 63 year old female history anxiety/MDD, hypothyroidism, fibromyalgia, presented for AMS and lethargy. Stroke code was activated, NIHSS 21. CTH negative, CTA reporting developmental venous anomaly in the left cerebellum. Patient was placed on vEEG, intubated for airway protection, now on sedation in ICU. Admitted for toxic metabolic encephalopathy v seizure w/u to MICU. Flu+Neuroendovascular team consulted for evaluation of incidental CTA + finding of developmental venous anomaly of left cerebellum.   Patient is without complaints this AM, seen and examined by NI ACP and attending. Extubated, pending downgrade to floor. MRI brain still pending.   Patient states that about 20 years ago she learned she had a "vein finding" on CT when she experienced some jaw pain, which was not followed up since. Denies ever smoking history, denies alcohol use, denies family history of brain bleeding or neurologic events. Denies ever episodes of sudden onset HA, weakness, dizziness, or LOC.     Past medical history:   Fibromyalgia  Eczema, unspecified type  Depression, unspecified depression type    Medications:   albuterol/ipratropium for Nebulization 3 milliLiter(s) Nebulizer every 6 hours  amitriptyline 25 milliGRAM(s) Oral at bedtime  amLODIPine   Tablet 5 milliGRAM(s) Oral daily  azithromycin  IVPB 500 milliGRAM(s) IV Intermittent every 24 hours  cefTRIAXone   IVPB 1000 milliGRAM(s) IV Intermittent every 24 hours  chlorhexidine 2% Cloths 1 Application(s) Topical <User Schedule>  enoxaparin Injectable 40 milliGRAM(s) SubCutaneous every 24 hours  escitalopram 10 milliGRAM(s) Oral daily  metoprolol tartrate 25 milliGRAM(s) Oral every 8 hours  oseltamivir 75 milliGRAM(s) Oral two times a day  pantoprazole    Tablet 40 milliGRAM(s) Oral before breakfast      Vitals:   T(F): 97 (12-22-23 @ 08:00), Max: 98.9 (12-21-23 @ 20:00)  HR: 92 (12-22-23 @ 09:00) (84 - 100)  BP: 173/77 (12-22-23 @ 09:00) (137/91 - 203/98)  RR: 25 (12-22-23 @ 09:00) (20 - 25)  SpO2: 98% (12-22-23 @ 09:00) (95% - 99%)    Labs:                         12.0   15.09 )-----------( 205      ( 22 Dec 2023 04:14 )             37.1     12-22    139  |  99  |  12  ----------------------------<  123<H>  4.2   |  24  |  0.8    Ca    9.0      22 Dec 2023 04:14  Phos  4.7     12-20  Mg     2.3     12-22    TPro  6.2  /  Alb  3.5  /  TBili  0.4  /  DBili  x   /  AST  36  /  ALT  54<H>  /  AlkPhos  109  12-20      LIVER FUNCTIONS - ( 20 Dec 2023 16:28 )  Alb: 3.5 g/dL / Pro: 6.2 g/dL / ALK PHOS: 109 U/L / ALT: 54 U/L / AST: 36 U/L / GGT: x             Exam:   General - NAD   Neuro- AAo4, follows commands, EOMi, visual fields intact, face symmetric, moves all extremities to antigravity, sensation intact, no dysmetria on finger to nose, no dysarthria, no aphasia     Radiology:   Imaging reviewed per neuroendovascular ACP/attending.   < from: CT Angio Neck Stroke Protocol w/ IV Cont (12.19.23 @ 22:44) >    IMPRESSION:    1.  Utilizing a CBF < 30% and Tmax > 6 seconds, no evidence of core   infarct or hypoperfusion.  2.  No intracranial large vessel occlusion or aneurysm.  3.  Left cerebellar developmental venous anomaly.  4.  Thyroid hypodensities can be further evaluated with nonemergent   outpatient thyroid sonogram.      < end of copied text >  < from: CT Brain Stroke Protocol (12.19.23 @ 22:28) >    IMPRESSION:    No evidence of acute intracranial pathology.      < end of copied text >      Assessment:   Patient is a 63 year old female history anxiety/MDD, hypothyroidism, fibromyalgia, presented for AMS and lethargy. Stroke code was activated, NIHSS 21. CTH negative, CTA reporting developmental venous anomaly in the left cerebellum. Patient was placed on vEEG, intubated for airway protection, now on sedation in ICU. Admitted for toxic metabolic encephalopathy v seizure w/u to MICU. Flu+Neuroendovascular team consulted for evaluation of likely incidental CTA + finding of developmental venous anomaly of left cerebellum.     Suggestions:  MRI brain pending     Patient can be added for diagnostic cerebral angiogram this coming Tuesday 12/26   - NPO except medications after midnight Monday night   - IVF while NPO   - CBC/CMP/Coag AM prior to procedure     If patient is otherwise cleared for d/c prior to scheduled angiogram, can arrange as an outpatient.     Discussed with Dr Garcia.  [phone] neuroendovascular

## 2023-12-22 NOTE — PROGRESS NOTE ADULT - SUBJECTIVE AND OBJECTIVE BOX
MICU RESIDENT PROGRESS NOTE    Patient is a 63y old  Female who presents with a chief complaint of AHRF (22 Dec 2023 09:50)        INTERVAL HPI/OVERNIGHT EVENTS:   Overnight, Pt hypertensive to 170s systolic. Comfortable 2L NC sating 100%, no other acute events.      ICU Vital Signs Last 24 Hrs  T(C): 36.1 (22 Dec 2023 08:00), Max: 37.2 (21 Dec 2023 20:00)  T(F): 97 (22 Dec 2023 08:00), Max: 98.9 (21 Dec 2023 20:00)  HR: 92 (22 Dec 2023 09:00) (84 - 100)  BP: 173/77 (22 Dec 2023 09:00) (137/91 - 203/98)  BP(mean): 114 (22 Dec 2023 09:00) (97 - 141)  ABP: --  ABP(mean): --  RR: 25 (22 Dec 2023 09:00) (20 - 25)  SpO2: 98% (22 Dec 2023 09:00) (95% - 99%)    O2 Parameters below as of 22 Dec 2023 09:00  Patient On (Oxygen Delivery Method): nasal cannula  O2 Flow (L/min): 2        I&O's Summary    21 Dec 2023 07:01  -  22 Dec 2023 07:00  --------------------------------------------------------  IN: 400 mL / OUT: 2025 mL / NET: -1625 mL    22 Dec 2023 07:01  -  22 Dec 2023 10:24  --------------------------------------------------------  IN: 200 mL / OUT: 500 mL / NET: -300 mL          LABS:                        12.0   15.09 )-----------( 205      ( 22 Dec 2023 04:14 )             37.1     12-22    139  |  99  |  12  ----------------------------<  123<H>  4.2   |  24  |  0.8    Ca    9.0      22 Dec 2023 04:14  Phos  4.7     12-20  Mg     2.3     12-22    TPro  6.2  /  Alb  3.5  /  TBili  0.4  /  DBili  x   /  AST  36  /  ALT  54<H>  /  AlkPhos  109  12-20      Urinalysis Basic - ( 22 Dec 2023 04:14 )    Color: x / Appearance: x / SG: x / pH: x  Gluc: 123 mg/dL / Ketone: x  / Bili: x / Urobili: x   Blood: x / Protein: x / Nitrite: x   Leuk Esterase: x / RBC: x / WBC x   Sq Epi: x / Non Sq Epi: x / Bacteria: x      CAPILLARY BLOOD GLUCOSE        ABG - ( 20 Dec 2023 14:42 )  pH, Arterial: 7.41  pH, Blood: x     /  pCO2: 46    /  pO2: 69    / HCO3: 29    / Base Excess: 3.6   /  SaO2: 95.1                RADIOLOGY & ADDITIONAL TESTS:    Consultant(s) Notes Reviewed:  [x ] YES  [ ] NO    MEDICATIONS  (STANDING):  albuterol/ipratropium for Nebulization 3 milliLiter(s) Nebulizer every 6 hours  amitriptyline 25 milliGRAM(s) Oral at bedtime  amLODIPine   Tablet 5 milliGRAM(s) Oral daily  azithromycin  IVPB 500 milliGRAM(s) IV Intermittent every 24 hours  cefTRIAXone   IVPB 1000 milliGRAM(s) IV Intermittent every 24 hours  chlorhexidine 2% Cloths 1 Application(s) Topical <User Schedule>  enoxaparin Injectable 40 milliGRAM(s) SubCutaneous every 24 hours  escitalopram 10 milliGRAM(s) Oral daily  metoprolol tartrate 25 milliGRAM(s) Oral every 8 hours  oseltamivir 75 milliGRAM(s) Oral two times a day  pantoprazole    Tablet 40 milliGRAM(s) Oral before breakfast    MEDICATIONS  (PRN):  acetaminophen     Tablet .. 975 milliGRAM(s) Oral every 8 hours PRN Temp greater or equal to 38.5C (101.3F)  albuterol/ipratropium for Nebulization 3 milliLiter(s) Nebulizer every 6 hours PRN Shortness of Breath and/or Wheezing  benzonatate 100 milliGRAM(s) Oral three times a day PRN Cough      PHYSICAL EXAM:  CONSTITUTIONAL: NAD  ENT: Airway patent, Mouth with normal mucosa. No thrush  EYES: Pupils equal. Round and reactive to light.  CARDIAC: Normal rate, Regular rhythm.    RESPIRATORY: Decreased bilateral air entry, Mild right sided wheezing, Bilateral BS, Normal chest expansion. Not tachypneic, No use of accessory muscles  GASTROINTESTINAL: Abdomen soft, Non-tender, No guarding, + BS  MUSCULOSKELETAL: Range of motion is not limited  NEUROLOGICAL: Alert and oriented x4  SKIN: Skin normal color for race, Warm and dry. Facial flushing   PSYCHIATRIC: No apparent risk to self or others.

## 2023-12-22 NOTE — PROGRESS NOTE ADULT - ASSESSMENT
IMPRESSION:    Acute Hypoxemic Respiratory Failure SP Intubation 12/19, Extubated 12/20   Influenza A Pneumonia   Possible superimposed bacterial infection   Toxic Metabolic Encephalopathy, resolved   Left cerebellar venous anomaly    HO Fibromyalgia   HO Hypothyroidism   HO Anxiety       PLAN:    CNS: CTH unremarkable, drug/urine toxicology noted, Neuroendovascular recommendations noted, f/u MRI brain     HEENT: Oral care.     PULMONARY:  HOB @ 45 degrees.  Aspiration precautions. Anti-tussive, Chest PT, sputum culture, incentive spirometry, wean oxygen, target SAaO2 92-96%, Nebs Q6H and PRN     CARDIOVASCULAR: 2D-Echo EF 63%, not on pressors, troponin negative, increase metoprolol 25mg to Q8H, add Norvasc 5mg     GI: GI prophylaxis.  Feeding as tolerated. Bowel regimen     RENAL:  Follow up lytes.  Correct as needed    INFECTIOUS DISEASE: Follow up cultures, Tamiflu, Ceftriaxone and azithromycin day 3 of 5. procalcitonin 0.54, nasal MRSA negative. urine legionella and strep Ag negative     HEMATOLOGICAL:  DVT prophylaxis.  Dimer 2K,  LE duplex negative     ENDOCRINE:  Follow up FS.  Insulin protocol if needed.    MUSCULOSKELETAL: activity as tolerated, PT/OT    No lines/ Baker    Goals of care    Downgrade to Floor

## 2023-12-23 LAB
ANION GAP SERPL CALC-SCNC: 15 MMOL/L — HIGH (ref 7–14)
ANION GAP SERPL CALC-SCNC: 15 MMOL/L — HIGH (ref 7–14)
BASOPHILS # BLD AUTO: 0.01 K/UL — SIGNIFICANT CHANGE UP (ref 0–0.2)
BASOPHILS # BLD AUTO: 0.01 K/UL — SIGNIFICANT CHANGE UP (ref 0–0.2)
BASOPHILS NFR BLD AUTO: 0.1 % — SIGNIFICANT CHANGE UP (ref 0–1)
BASOPHILS NFR BLD AUTO: 0.1 % — SIGNIFICANT CHANGE UP (ref 0–1)
BUN SERPL-MCNC: 23 MG/DL — HIGH (ref 10–20)
BUN SERPL-MCNC: 23 MG/DL — HIGH (ref 10–20)
CALCIUM SERPL-MCNC: 9.2 MG/DL — SIGNIFICANT CHANGE UP (ref 8.4–10.5)
CALCIUM SERPL-MCNC: 9.2 MG/DL — SIGNIFICANT CHANGE UP (ref 8.4–10.5)
CHLORIDE SERPL-SCNC: 99 MMOL/L — SIGNIFICANT CHANGE UP (ref 98–110)
CHLORIDE SERPL-SCNC: 99 MMOL/L — SIGNIFICANT CHANGE UP (ref 98–110)
CO2 SERPL-SCNC: 22 MMOL/L — SIGNIFICANT CHANGE UP (ref 17–32)
CO2 SERPL-SCNC: 22 MMOL/L — SIGNIFICANT CHANGE UP (ref 17–32)
CREAT SERPL-MCNC: 0.8 MG/DL — SIGNIFICANT CHANGE UP (ref 0.7–1.5)
CREAT SERPL-MCNC: 0.8 MG/DL — SIGNIFICANT CHANGE UP (ref 0.7–1.5)
EGFR: 83 ML/MIN/1.73M2 — SIGNIFICANT CHANGE UP
EGFR: 83 ML/MIN/1.73M2 — SIGNIFICANT CHANGE UP
EOSINOPHIL # BLD AUTO: 0.01 K/UL — SIGNIFICANT CHANGE UP (ref 0–0.7)
EOSINOPHIL # BLD AUTO: 0.01 K/UL — SIGNIFICANT CHANGE UP (ref 0–0.7)
EOSINOPHIL NFR BLD AUTO: 0.1 % — SIGNIFICANT CHANGE UP (ref 0–8)
EOSINOPHIL NFR BLD AUTO: 0.1 % — SIGNIFICANT CHANGE UP (ref 0–8)
GLUCOSE SERPL-MCNC: 168 MG/DL — HIGH (ref 70–99)
GLUCOSE SERPL-MCNC: 168 MG/DL — HIGH (ref 70–99)
HCT VFR BLD CALC: 40.8 % — SIGNIFICANT CHANGE UP (ref 37–47)
HCT VFR BLD CALC: 40.8 % — SIGNIFICANT CHANGE UP (ref 37–47)
HGB BLD-MCNC: 13.3 G/DL — SIGNIFICANT CHANGE UP (ref 12–16)
HGB BLD-MCNC: 13.3 G/DL — SIGNIFICANT CHANGE UP (ref 12–16)
IMM GRANULOCYTES NFR BLD AUTO: 0.5 % — HIGH (ref 0.1–0.3)
IMM GRANULOCYTES NFR BLD AUTO: 0.5 % — HIGH (ref 0.1–0.3)
LYMPHOCYTES # BLD AUTO: 1.57 K/UL — SIGNIFICANT CHANGE UP (ref 1.2–3.4)
LYMPHOCYTES # BLD AUTO: 1.57 K/UL — SIGNIFICANT CHANGE UP (ref 1.2–3.4)
LYMPHOCYTES # BLD AUTO: 18.3 % — LOW (ref 20.5–51.1)
LYMPHOCYTES # BLD AUTO: 18.3 % — LOW (ref 20.5–51.1)
MAGNESIUM SERPL-MCNC: 2.2 MG/DL — SIGNIFICANT CHANGE UP (ref 1.8–2.4)
MAGNESIUM SERPL-MCNC: 2.2 MG/DL — SIGNIFICANT CHANGE UP (ref 1.8–2.4)
MCHC RBC-ENTMCNC: 26.4 PG — LOW (ref 27–31)
MCHC RBC-ENTMCNC: 26.4 PG — LOW (ref 27–31)
MCHC RBC-ENTMCNC: 32.6 G/DL — SIGNIFICANT CHANGE UP (ref 32–37)
MCHC RBC-ENTMCNC: 32.6 G/DL — SIGNIFICANT CHANGE UP (ref 32–37)
MCV RBC AUTO: 81.1 FL — SIGNIFICANT CHANGE UP (ref 81–99)
MCV RBC AUTO: 81.1 FL — SIGNIFICANT CHANGE UP (ref 81–99)
MONOCYTES # BLD AUTO: 0.57 K/UL — SIGNIFICANT CHANGE UP (ref 0.1–0.6)
MONOCYTES # BLD AUTO: 0.57 K/UL — SIGNIFICANT CHANGE UP (ref 0.1–0.6)
MONOCYTES NFR BLD AUTO: 6.6 % — SIGNIFICANT CHANGE UP (ref 1.7–9.3)
MONOCYTES NFR BLD AUTO: 6.6 % — SIGNIFICANT CHANGE UP (ref 1.7–9.3)
NEUTROPHILS # BLD AUTO: 6.39 K/UL — SIGNIFICANT CHANGE UP (ref 1.4–6.5)
NEUTROPHILS # BLD AUTO: 6.39 K/UL — SIGNIFICANT CHANGE UP (ref 1.4–6.5)
NEUTROPHILS NFR BLD AUTO: 74.4 % — SIGNIFICANT CHANGE UP (ref 42.2–75.2)
NEUTROPHILS NFR BLD AUTO: 74.4 % — SIGNIFICANT CHANGE UP (ref 42.2–75.2)
NRBC # BLD: 0 /100 WBCS — SIGNIFICANT CHANGE UP (ref 0–0)
NRBC # BLD: 0 /100 WBCS — SIGNIFICANT CHANGE UP (ref 0–0)
PLATELET # BLD AUTO: 238 K/UL — SIGNIFICANT CHANGE UP (ref 130–400)
PLATELET # BLD AUTO: 238 K/UL — SIGNIFICANT CHANGE UP (ref 130–400)
PMV BLD: 10.7 FL — HIGH (ref 7.4–10.4)
PMV BLD: 10.7 FL — HIGH (ref 7.4–10.4)
POTASSIUM SERPL-MCNC: 4.3 MMOL/L — SIGNIFICANT CHANGE UP (ref 3.5–5)
POTASSIUM SERPL-MCNC: 4.3 MMOL/L — SIGNIFICANT CHANGE UP (ref 3.5–5)
POTASSIUM SERPL-SCNC: 4.3 MMOL/L — SIGNIFICANT CHANGE UP (ref 3.5–5)
POTASSIUM SERPL-SCNC: 4.3 MMOL/L — SIGNIFICANT CHANGE UP (ref 3.5–5)
RBC # BLD: 5.03 M/UL — SIGNIFICANT CHANGE UP (ref 4.2–5.4)
RBC # BLD: 5.03 M/UL — SIGNIFICANT CHANGE UP (ref 4.2–5.4)
RBC # FLD: 14.8 % — HIGH (ref 11.5–14.5)
RBC # FLD: 14.8 % — HIGH (ref 11.5–14.5)
SODIUM SERPL-SCNC: 136 MMOL/L — SIGNIFICANT CHANGE UP (ref 135–146)
SODIUM SERPL-SCNC: 136 MMOL/L — SIGNIFICANT CHANGE UP (ref 135–146)
WBC # BLD: 8.59 K/UL — SIGNIFICANT CHANGE UP (ref 4.8–10.8)
WBC # BLD: 8.59 K/UL — SIGNIFICANT CHANGE UP (ref 4.8–10.8)
WBC # FLD AUTO: 8.59 K/UL — SIGNIFICANT CHANGE UP (ref 4.8–10.8)
WBC # FLD AUTO: 8.59 K/UL — SIGNIFICANT CHANGE UP (ref 4.8–10.8)

## 2023-12-23 PROCEDURE — 71045 X-RAY EXAM CHEST 1 VIEW: CPT | Mod: 26

## 2023-12-23 PROCEDURE — 99232 SBSQ HOSP IP/OBS MODERATE 35: CPT

## 2023-12-23 RX ORDER — AMLODIPINE BESYLATE 2.5 MG/1
10 TABLET ORAL DAILY
Refills: 0 | Status: DISCONTINUED | OUTPATIENT
Start: 2023-12-24 | End: 2023-12-29

## 2023-12-23 RX ORDER — HYDRALAZINE HCL 50 MG
5 TABLET ORAL ONCE
Refills: 0 | Status: COMPLETED | OUTPATIENT
Start: 2023-12-23 | End: 2023-12-23

## 2023-12-23 RX ORDER — AMLODIPINE BESYLATE 2.5 MG/1
5 TABLET ORAL ONCE
Refills: 0 | Status: COMPLETED | OUTPATIENT
Start: 2023-12-23 | End: 2023-12-23

## 2023-12-23 RX ORDER — HYDRALAZINE HCL 50 MG
10 TABLET ORAL ONCE
Refills: 0 | Status: COMPLETED | OUTPATIENT
Start: 2023-12-23 | End: 2023-12-23

## 2023-12-23 RX ADMIN — Medication 25 MILLIGRAM(S): at 21:54

## 2023-12-23 RX ADMIN — Medication 25 MILLIGRAM(S): at 05:13

## 2023-12-23 RX ADMIN — Medication 3 MILLILITER(S): at 21:08

## 2023-12-23 RX ADMIN — Medication 10 MILLIGRAM(S): at 19:24

## 2023-12-23 RX ADMIN — PANTOPRAZOLE SODIUM 40 MILLIGRAM(S): 20 TABLET, DELAYED RELEASE ORAL at 05:13

## 2023-12-23 RX ADMIN — Medication 25 MILLIGRAM(S): at 13:43

## 2023-12-23 RX ADMIN — Medication 10 MILLIGRAM(S): at 06:19

## 2023-12-23 RX ADMIN — AMLODIPINE BESYLATE 5 MILLIGRAM(S): 2.5 TABLET ORAL at 05:14

## 2023-12-23 RX ADMIN — Medication 3 MILLILITER(S): at 08:39

## 2023-12-23 RX ADMIN — MORPHINE SULFATE 2 MILLIGRAM(S): 50 CAPSULE, EXTENDED RELEASE ORAL at 21:54

## 2023-12-23 RX ADMIN — Medication 3 MILLILITER(S): at 14:21

## 2023-12-23 RX ADMIN — CEFTRIAXONE 100 MILLIGRAM(S): 500 INJECTION, POWDER, FOR SOLUTION INTRAMUSCULAR; INTRAVENOUS at 12:06

## 2023-12-23 RX ADMIN — ENOXAPARIN SODIUM 40 MILLIGRAM(S): 100 INJECTION SUBCUTANEOUS at 12:06

## 2023-12-23 RX ADMIN — Medication 5 MILLIGRAM(S): at 22:47

## 2023-12-23 RX ADMIN — MORPHINE SULFATE 2 MILLIGRAM(S): 50 CAPSULE, EXTENDED RELEASE ORAL at 15:41

## 2023-12-23 RX ADMIN — AZITHROMYCIN 255 MILLIGRAM(S): 500 TABLET, FILM COATED ORAL at 22:00

## 2023-12-23 RX ADMIN — AMLODIPINE BESYLATE 5 MILLIGRAM(S): 2.5 TABLET ORAL at 17:48

## 2023-12-23 RX ADMIN — ZOLPIDEM TARTRATE 5 MILLIGRAM(S): 10 TABLET ORAL at 00:04

## 2023-12-23 RX ADMIN — Medication 75 MILLIGRAM(S): at 05:13

## 2023-12-23 RX ADMIN — MORPHINE SULFATE 2 MILLIGRAM(S): 50 CAPSULE, EXTENDED RELEASE ORAL at 08:15

## 2023-12-23 RX ADMIN — ESCITALOPRAM OXALATE 10 MILLIGRAM(S): 10 TABLET, FILM COATED ORAL at 12:06

## 2023-12-23 RX ADMIN — CHLORHEXIDINE GLUCONATE 1 APPLICATION(S): 213 SOLUTION TOPICAL at 06:00

## 2023-12-23 RX ADMIN — Medication 75 MILLIGRAM(S): at 17:48

## 2023-12-23 RX ADMIN — Medication 25 MILLIGRAM(S): at 21:55

## 2023-12-23 NOTE — DIETITIAN INITIAL EVALUATION ADULT - COLLABORATION WITH OTHER PROVIDERS
RD to monitor: diet order, body composition, energy intake, nutrition focused physical finding: high risk due in 4 days   Interventions: meals and snacks, medical nutrition supplements, coordination of care

## 2023-12-23 NOTE — DIETITIAN INITIAL EVALUATION ADULT - ADD RECOMMEND
1) Continue current diet order  2) Order Ensure high protein BID to optimize energy and protein intake  3) Encourage PO intake and provide assistance prn

## 2023-12-23 NOTE — DIETITIAN INITIAL EVALUATION ADULT - PERTINENT LABORATORY DATA
12-23    136  |  99  |  23<H>  ----------------------------<  168<H>  4.3   |  22  |  0.8    Ca    9.2      23 Dec 2023 08:53  Mg     2.2     12-23    A1C with Estimated Average Glucose Result: 6.9 % (12-21-23 @ 04:29)

## 2023-12-23 NOTE — PROGRESS NOTE ADULT - ASSESSMENT
#Acute Hypoxemic Respiratory Failure SP Intubation 12/19, Extubated 12/20   #Influenza A Pneumonia   #Possible superimposed bacterial infection   - s/p exubation 12/20 @ 14:30    - c/w oseltamivir 75 mg oral BID, ceftriaxone 1000 mg IV q24 hours and azithromycin 500 mg IV q24   - c/p benzonatate 100mg TID PRN    #Left Cerebellar venous Anomaly   - f/u MRI if done  - Neuro planning for diagnostic Angiogram on tuesday     #) Possible Opiate withdrawal  - continue IV morphine PRN  - Chronic pain eval    #HO Fribromyalgia   - c/w amytriptyline 25mg qHS  Pain management consult     #HO Hypothyroidism     #HO Anxiety/Depression  - c/o escitalopram 10mg q24    #DVT ppx: Lovenox 40mg daily  #GI ppx: protonix 40mg daily  #Diet: Soft and bite-sized.

## 2023-12-23 NOTE — DIETITIAN INITIAL EVALUATION ADULT - OTHER CALCULATIONS
weigh used: 55.6 kg - dosing weight  needs increased in the setting of pressure injury  Fluid needs: 1ml/kcal

## 2023-12-23 NOTE — CHART NOTE - NSCHARTNOTEFT_GEN_A_CORE
Patient's /81 at 22:13 after having received 10 mg Hydralazine IVP around 19:00.  Patient asymptomatic, denied headache, blurry vision.   Ordered another hydralazine 5 mg IVP ---> BP improved to 158/75. Patient continued to be comfortable and denied any symptoms.

## 2023-12-23 NOTE — DIETITIAN INITIAL EVALUATION ADULT - PERTINENT MEDS FT
MEDICATIONS  (STANDING):  albuterol/ipratropium for Nebulization 3 milliLiter(s) Nebulizer every 6 hours  amitriptyline 25 milliGRAM(s) Oral at bedtime  amLODIPine   Tablet 5 milliGRAM(s) Oral daily  azithromycin  IVPB 500 milliGRAM(s) IV Intermittent every 24 hours  cefTRIAXone   IVPB 1000 milliGRAM(s) IV Intermittent every 24 hours  chlorhexidine 2% Cloths 1 Application(s) Topical <User Schedule>  enoxaparin Injectable 40 milliGRAM(s) SubCutaneous every 24 hours  escitalopram 10 milliGRAM(s) Oral daily  metoprolol tartrate 25 milliGRAM(s) Oral every 8 hours  oseltamivir 75 milliGRAM(s) Oral two times a day  pantoprazole    Tablet 40 milliGRAM(s) Oral before breakfast  petrolatum white Ointment 1 Application(s) Topical every 12 hours    MEDICATIONS  (PRN):  acetaminophen     Tablet .. 975 milliGRAM(s) Oral every 8 hours PRN Temp greater or equal to 38.5C (101.3F)  albuterol/ipratropium for Nebulization 3 milliLiter(s) Nebulizer every 6 hours PRN Shortness of Breath and/or Wheezing  benzonatate 100 milliGRAM(s) Oral three times a day PRN Cough  morphine  - Injectable 2 milliGRAM(s) IV Push every 6 hours PRN Severe Pain (7 - 10)  zolpidem 5 milliGRAM(s) Oral at bedtime PRN Insomnia

## 2023-12-23 NOTE — CHART NOTE - NSCHARTNOTEFT_GEN_A_CORE
Called for BP of 213/98. No complaints of headache, chest pain, shortness of breath, nausea, dizziness. Gross neurological examination within normal limits. Patient got the morning dose of Norvasc 5 mg and Metoprolol 25 mg. On recheck of BP in 30 minutes /100. A push of IV hydralazine 10 mg given. Patient complaining of chronic pain from fibromyalgia which is not well controlled, according to her takes a increased dose of morphine at home, check with pharmacy and her PCP to make sure. Currently complaining of mild pain, on enquiry said doesnot require morphine saying this dosing doesnot help.

## 2023-12-23 NOTE — DIETITIAN INITIAL EVALUATION ADULT - OTHER INFO
Pertinent medical information:   Patient is a 63 year old female history anxiety/MDD, hypothyroidism, fibromyalgia, presented for AMS and lethargy. Stroke code was activated, NIHSS 21. CTH negative, CTA reporting developmental venous anomaly in the left cerebellum. Patient was placed on vEEG, intubated for airway protection, now on sedation in ICU. Admitted for toxic metabolic encephalopathy v seizure w/u to MICU. Flu+Neuroendovascular team consulted for evaluation of incidental CTA + finding of developmental venous anomaly of left cerebellum.   Patient is without complaints this AM, seen and examined by NI ACP and attending. Extubated, pending downgrade to floor. MRI brain still pending.

## 2023-12-23 NOTE — PROGRESS NOTE ADULT - SUBJECTIVE AND OBJECTIVE BOX
SUBJECTIVE:    Patient is a 63y old Female who presents with a chief complaint of Sepsis     (23 Dec 2023 11:00)    Currently admitted to medicine with the primary diagnosis of Sepsis with acute hypoxic respiratory failure       Today is hospital day 4d.  Pt still coughing and shortness of breath      PAST MEDICAL & SURGICAL HISTORY  Fibromyalgia    Eczema, unspecified type    Depression, unspecified depression type    History of surgery  RIGHT HIP REPLACEMENT    History of surgery  BACK SURGERY      SOCIAL HISTORY:  Negative for smoking/alcohol/drug use.     ALLERGIES:  No Known Allergies    MEDICATIONS:  STANDING MEDICATIONS  albuterol/ipratropium for Nebulization 3 milliLiter(s) Nebulizer every 6 hours  amitriptyline 25 milliGRAM(s) Oral at bedtime  amLODIPine   Tablet 5 milliGRAM(s) Oral daily  azithromycin  IVPB 500 milliGRAM(s) IV Intermittent every 24 hours  cefTRIAXone   IVPB 1000 milliGRAM(s) IV Intermittent every 24 hours  chlorhexidine 2% Cloths 1 Application(s) Topical <User Schedule>  enoxaparin Injectable 40 milliGRAM(s) SubCutaneous every 24 hours  escitalopram 10 milliGRAM(s) Oral daily  metoprolol tartrate 25 milliGRAM(s) Oral every 8 hours  oseltamivir 75 milliGRAM(s) Oral two times a day  pantoprazole    Tablet 40 milliGRAM(s) Oral before breakfast  petrolatum white Ointment 1 Application(s) Topical every 12 hours    PRN MEDICATIONS  acetaminophen     Tablet .. 975 milliGRAM(s) Oral every 8 hours PRN  albuterol/ipratropium for Nebulization 3 milliLiter(s) Nebulizer every 6 hours PRN  benzonatate 100 milliGRAM(s) Oral three times a day PRN  morphine  - Injectable 2 milliGRAM(s) IV Push every 6 hours PRN  zolpidem 5 milliGRAM(s) Oral at bedtime PRN    VITALS:   T(F): 96.7  HR: 95  BP: 157/80  RR: 18  SpO2: 95%    PHYSICAL EXAM:  GEN: No acute distress  LUNGS:  wheeze   HEART: S1/S2 present.    ABD: Soft, non-tender, non-distended. Bowel sounds present       LABS:                        13.3   8.59  )-----------( 238      ( 23 Dec 2023 08:53 )             40.8     12-23    136  |  99  |  23<H>  ----------------------------<  168<H>  4.3   |  22  |  0.8    Ca    9.2      23 Dec 2023 08:53  Mg     2.2     12-23        Urinalysis Basic - ( 23 Dec 2023 08:53 )    Color: x / Appearance: x / SG: x / pH: x  Gluc: 168 mg/dL / Ketone: x  / Bili: x / Urobili: x   Blood: x / Protein: x / Nitrite: x   Leuk Esterase: x / RBC: x / WBC x   Sq Epi: x / Non Sq Epi: x / Bacteria: x                    RADIOLOGY:

## 2023-12-23 NOTE — DIETITIAN INITIAL EVALUATION ADULT - ORAL INTAKE PTA/DIET HISTORY
Visited pt 2x, pt sleeping and unarousable at time of RD visit. Will need to obtain nutrition hx at f/u

## 2023-12-24 LAB
ANION GAP SERPL CALC-SCNC: 14 MMOL/L — SIGNIFICANT CHANGE UP (ref 7–14)
ANION GAP SERPL CALC-SCNC: 14 MMOL/L — SIGNIFICANT CHANGE UP (ref 7–14)
BASOPHILS # BLD AUTO: 0.04 K/UL — SIGNIFICANT CHANGE UP (ref 0–0.2)
BASOPHILS # BLD AUTO: 0.04 K/UL — SIGNIFICANT CHANGE UP (ref 0–0.2)
BASOPHILS NFR BLD AUTO: 0.4 % — SIGNIFICANT CHANGE UP (ref 0–1)
BASOPHILS NFR BLD AUTO: 0.4 % — SIGNIFICANT CHANGE UP (ref 0–1)
BUN SERPL-MCNC: 21 MG/DL — HIGH (ref 10–20)
BUN SERPL-MCNC: 21 MG/DL — HIGH (ref 10–20)
CALCIUM SERPL-MCNC: 9.2 MG/DL — SIGNIFICANT CHANGE UP (ref 8.4–10.5)
CALCIUM SERPL-MCNC: 9.2 MG/DL — SIGNIFICANT CHANGE UP (ref 8.4–10.5)
CHLORIDE SERPL-SCNC: 98 MMOL/L — SIGNIFICANT CHANGE UP (ref 98–110)
CHLORIDE SERPL-SCNC: 98 MMOL/L — SIGNIFICANT CHANGE UP (ref 98–110)
CO2 SERPL-SCNC: 23 MMOL/L — SIGNIFICANT CHANGE UP (ref 17–32)
CO2 SERPL-SCNC: 23 MMOL/L — SIGNIFICANT CHANGE UP (ref 17–32)
CREAT SERPL-MCNC: 0.8 MG/DL — SIGNIFICANT CHANGE UP (ref 0.7–1.5)
CREAT SERPL-MCNC: 0.8 MG/DL — SIGNIFICANT CHANGE UP (ref 0.7–1.5)
EGFR: 83 ML/MIN/1.73M2 — SIGNIFICANT CHANGE UP
EGFR: 83 ML/MIN/1.73M2 — SIGNIFICANT CHANGE UP
EOSINOPHIL # BLD AUTO: 0 K/UL — SIGNIFICANT CHANGE UP (ref 0–0.7)
EOSINOPHIL # BLD AUTO: 0 K/UL — SIGNIFICANT CHANGE UP (ref 0–0.7)
EOSINOPHIL NFR BLD AUTO: 0 % — SIGNIFICANT CHANGE UP (ref 0–8)
EOSINOPHIL NFR BLD AUTO: 0 % — SIGNIFICANT CHANGE UP (ref 0–8)
GLUCOSE SERPL-MCNC: 120 MG/DL — HIGH (ref 70–99)
GLUCOSE SERPL-MCNC: 120 MG/DL — HIGH (ref 70–99)
HCT VFR BLD CALC: 40.6 % — SIGNIFICANT CHANGE UP (ref 37–47)
HCT VFR BLD CALC: 40.6 % — SIGNIFICANT CHANGE UP (ref 37–47)
HGB BLD-MCNC: 13.3 G/DL — SIGNIFICANT CHANGE UP (ref 12–16)
HGB BLD-MCNC: 13.3 G/DL — SIGNIFICANT CHANGE UP (ref 12–16)
IMM GRANULOCYTES NFR BLD AUTO: 0.6 % — HIGH (ref 0.1–0.3)
IMM GRANULOCYTES NFR BLD AUTO: 0.6 % — HIGH (ref 0.1–0.3)
LYMPHOCYTES # BLD AUTO: 2.06 K/UL — SIGNIFICANT CHANGE UP (ref 1.2–3.4)
LYMPHOCYTES # BLD AUTO: 2.06 K/UL — SIGNIFICANT CHANGE UP (ref 1.2–3.4)
LYMPHOCYTES # BLD AUTO: 22.8 % — SIGNIFICANT CHANGE UP (ref 20.5–51.1)
LYMPHOCYTES # BLD AUTO: 22.8 % — SIGNIFICANT CHANGE UP (ref 20.5–51.1)
MAGNESIUM SERPL-MCNC: 2.2 MG/DL — SIGNIFICANT CHANGE UP (ref 1.8–2.4)
MAGNESIUM SERPL-MCNC: 2.2 MG/DL — SIGNIFICANT CHANGE UP (ref 1.8–2.4)
MCHC RBC-ENTMCNC: 26.6 PG — LOW (ref 27–31)
MCHC RBC-ENTMCNC: 26.6 PG — LOW (ref 27–31)
MCHC RBC-ENTMCNC: 32.8 G/DL — SIGNIFICANT CHANGE UP (ref 32–37)
MCHC RBC-ENTMCNC: 32.8 G/DL — SIGNIFICANT CHANGE UP (ref 32–37)
MCV RBC AUTO: 81.2 FL — SIGNIFICANT CHANGE UP (ref 81–99)
MCV RBC AUTO: 81.2 FL — SIGNIFICANT CHANGE UP (ref 81–99)
MONOCYTES # BLD AUTO: 1.03 K/UL — HIGH (ref 0.1–0.6)
MONOCYTES # BLD AUTO: 1.03 K/UL — HIGH (ref 0.1–0.6)
MONOCYTES NFR BLD AUTO: 11.4 % — HIGH (ref 1.7–9.3)
MONOCYTES NFR BLD AUTO: 11.4 % — HIGH (ref 1.7–9.3)
NEUTROPHILS # BLD AUTO: 5.85 K/UL — SIGNIFICANT CHANGE UP (ref 1.4–6.5)
NEUTROPHILS # BLD AUTO: 5.85 K/UL — SIGNIFICANT CHANGE UP (ref 1.4–6.5)
NEUTROPHILS NFR BLD AUTO: 64.8 % — SIGNIFICANT CHANGE UP (ref 42.2–75.2)
NEUTROPHILS NFR BLD AUTO: 64.8 % — SIGNIFICANT CHANGE UP (ref 42.2–75.2)
NRBC # BLD: 0 /100 WBCS — SIGNIFICANT CHANGE UP (ref 0–0)
NRBC # BLD: 0 /100 WBCS — SIGNIFICANT CHANGE UP (ref 0–0)
PLATELET # BLD AUTO: 282 K/UL — SIGNIFICANT CHANGE UP (ref 130–400)
PLATELET # BLD AUTO: 282 K/UL — SIGNIFICANT CHANGE UP (ref 130–400)
PMV BLD: 10.8 FL — HIGH (ref 7.4–10.4)
PMV BLD: 10.8 FL — HIGH (ref 7.4–10.4)
POTASSIUM SERPL-MCNC: 3.9 MMOL/L — SIGNIFICANT CHANGE UP (ref 3.5–5)
POTASSIUM SERPL-MCNC: 3.9 MMOL/L — SIGNIFICANT CHANGE UP (ref 3.5–5)
POTASSIUM SERPL-SCNC: 3.9 MMOL/L — SIGNIFICANT CHANGE UP (ref 3.5–5)
POTASSIUM SERPL-SCNC: 3.9 MMOL/L — SIGNIFICANT CHANGE UP (ref 3.5–5)
RBC # BLD: 5 M/UL — SIGNIFICANT CHANGE UP (ref 4.2–5.4)
RBC # BLD: 5 M/UL — SIGNIFICANT CHANGE UP (ref 4.2–5.4)
RBC # FLD: 14.7 % — HIGH (ref 11.5–14.5)
RBC # FLD: 14.7 % — HIGH (ref 11.5–14.5)
SODIUM SERPL-SCNC: 135 MMOL/L — SIGNIFICANT CHANGE UP (ref 135–146)
SODIUM SERPL-SCNC: 135 MMOL/L — SIGNIFICANT CHANGE UP (ref 135–146)
WBC # BLD: 9.03 K/UL — SIGNIFICANT CHANGE UP (ref 4.8–10.8)
WBC # BLD: 9.03 K/UL — SIGNIFICANT CHANGE UP (ref 4.8–10.8)
WBC # FLD AUTO: 9.03 K/UL — SIGNIFICANT CHANGE UP (ref 4.8–10.8)
WBC # FLD AUTO: 9.03 K/UL — SIGNIFICANT CHANGE UP (ref 4.8–10.8)

## 2023-12-24 PROCEDURE — 99232 SBSQ HOSP IP/OBS MODERATE 35: CPT

## 2023-12-24 PROCEDURE — 99222 1ST HOSP IP/OBS MODERATE 55: CPT

## 2023-12-24 RX ORDER — LISINOPRIL 2.5 MG/1
5 TABLET ORAL ONCE
Refills: 0 | Status: COMPLETED | OUTPATIENT
Start: 2023-12-24 | End: 2023-12-24

## 2023-12-24 RX ORDER — ZOLPIDEM TARTRATE 10 MG/1
5 TABLET ORAL ONCE
Refills: 0 | Status: DISCONTINUED | OUTPATIENT
Start: 2023-12-24 | End: 2023-12-24

## 2023-12-24 RX ORDER — ACETAMINOPHEN 500 MG
650 TABLET ORAL EVERY 6 HOURS
Refills: 0 | Status: DISCONTINUED | OUTPATIENT
Start: 2023-12-24 | End: 2023-12-29

## 2023-12-24 RX ORDER — OXYCODONE HYDROCHLORIDE 5 MG/1
2.5 TABLET ORAL EVERY 6 HOURS
Refills: 0 | Status: DISCONTINUED | OUTPATIENT
Start: 2023-12-24 | End: 2023-12-29

## 2023-12-24 RX ORDER — HYDRALAZINE HCL 50 MG
5 TABLET ORAL ONCE
Refills: 0 | Status: COMPLETED | OUTPATIENT
Start: 2023-12-24 | End: 2023-12-24

## 2023-12-24 RX ORDER — LISINOPRIL 2.5 MG/1
5 TABLET ORAL DAILY
Refills: 0 | Status: DISCONTINUED | OUTPATIENT
Start: 2023-12-24 | End: 2023-12-26

## 2023-12-24 RX ADMIN — Medication 25 MILLIGRAM(S): at 06:00

## 2023-12-24 RX ADMIN — Medication 25 MILLIGRAM(S): at 21:35

## 2023-12-24 RX ADMIN — Medication 375 MILLIGRAM(S): at 17:28

## 2023-12-24 RX ADMIN — CHLORHEXIDINE GLUCONATE 1 APPLICATION(S): 213 SOLUTION TOPICAL at 06:18

## 2023-12-24 RX ADMIN — ENOXAPARIN SODIUM 40 MILLIGRAM(S): 100 INJECTION SUBCUTANEOUS at 10:15

## 2023-12-24 RX ADMIN — Medication 5 MILLIGRAM(S): at 17:27

## 2023-12-24 RX ADMIN — Medication 75 MILLIGRAM(S): at 06:00

## 2023-12-24 RX ADMIN — AMLODIPINE BESYLATE 10 MILLIGRAM(S): 2.5 TABLET ORAL at 06:00

## 2023-12-24 RX ADMIN — ZOLPIDEM TARTRATE 5 MILLIGRAM(S): 10 TABLET ORAL at 00:28

## 2023-12-24 RX ADMIN — Medication 3 MILLILITER(S): at 13:23

## 2023-12-24 RX ADMIN — CEFTRIAXONE 100 MILLIGRAM(S): 500 INJECTION, POWDER, FOR SOLUTION INTRAMUSCULAR; INTRAVENOUS at 10:15

## 2023-12-24 RX ADMIN — Medication 75 MILLIGRAM(S): at 17:27

## 2023-12-24 RX ADMIN — Medication 650 MILLIGRAM(S): at 17:27

## 2023-12-24 RX ADMIN — Medication 3 MILLILITER(S): at 19:56

## 2023-12-24 RX ADMIN — ZOLPIDEM TARTRATE 5 MILLIGRAM(S): 10 TABLET ORAL at 00:08

## 2023-12-24 RX ADMIN — Medication 25 MILLIGRAM(S): at 14:28

## 2023-12-24 RX ADMIN — LISINOPRIL 5 MILLIGRAM(S): 2.5 TABLET ORAL at 10:15

## 2023-12-24 RX ADMIN — AZITHROMYCIN 255 MILLIGRAM(S): 500 TABLET, FILM COATED ORAL at 21:35

## 2023-12-24 RX ADMIN — ESCITALOPRAM OXALATE 10 MILLIGRAM(S): 10 TABLET, FILM COATED ORAL at 12:21

## 2023-12-24 RX ADMIN — OXYCODONE HYDROCHLORIDE 2.5 MILLIGRAM(S): 5 TABLET ORAL at 17:28

## 2023-12-24 RX ADMIN — LISINOPRIL 5 MILLIGRAM(S): 2.5 TABLET ORAL at 22:37

## 2023-12-24 RX ADMIN — PANTOPRAZOLE SODIUM 40 MILLIGRAM(S): 20 TABLET, DELAYED RELEASE ORAL at 06:00

## 2023-12-24 RX ADMIN — Medication 3 MILLILITER(S): at 08:50

## 2023-12-24 NOTE — OCCUPATIONAL THERAPY INITIAL EVALUATION ADULT - TRANSFER TRAINING, PT EVAL
Pt will perform sit<>stand, bed<>chair and toilet transfers with supervision using most appropriate AD by discharge

## 2023-12-24 NOTE — OCCUPATIONAL THERAPY INITIAL EVALUATION ADULT - ADL RETRAINING, OT EVAL
Pt will be educated on adapted dressing technique and perform LB dressing with supervision by discharge

## 2023-12-24 NOTE — PROGRESS NOTE ADULT - ATTENDING COMMENTS
64 yo F with PMHx of anxiety/MDD, Hypothyroidism and fibromyalgia BIBEMS to the hospital for AMS. Family called EMS because they found patient obtunded and altered at home 12/19, they were concerned that patient may have overdosed on her sleeping pills. Family do describe that for the past 2 days patient has been having a cough, yesterday evening she started becoming progressively lethargic and thus he called EMS.  Upon arrival a code stroke was called for AMS with CTA head neg for large vessel occlusion. In the ED patient was hypertensive and hypoxemic. The decision to intubate her was done for protection of airways. Patient admitted to ICU for AHRF 2/2 bilateral pneumonia       #Acute Hypoxemic Respiratory Failure secondary to Influenza A pneumonia  - Patient got intubated 12/19 for airway protection, extubated 12/20   finish abx and antiviral course   - On benzonatate 100mg TID PRN    #Elevated BP  - Patient on Amlodipine 10mg daily and metoprolol tartrate 25mg q8h  - 12/24: Start Lisinopril 5mg daily  - Pain control     #Left Cerebellar venous Anomaly   - MRI Brain ordered, not done yet  - Seen by neurology 12/22: Diagnostic cerebral angiogram on Tuesday 12/26  - Plan for NPO after midnight on Monday 12/25, IVF while NPO, order pre-op labs CBC/CMP/Coag in AM before procedure     # Possible Opiate withdrawal  - Continue IV morphine 2mg q6h PRN  - Consulted pain management, will follow up     #HO Fribromyalgia   - Continue amytriptyline 25mg qHS  - Consulted pain management, will follow up     #HO Anxiety/Depression  - c/o escitalopram 10mg q24      #HO Hypothyroidism           #DVT PPX: Lovenox 40mg SC daily    #GI PPX: Pantoprazole 40mg PO daily     #Diet:  Soft and bite-sized.    #Activity Order: As tolerated    #Handoff  - Started Lisinopril 5mg today   - Monitor BP  - Follow up pain management consult 62 yo F with PMHx of anxiety/MDD, Hypothyroidism and fibromyalgia BIBEMS to the hospital for AMS. Family called EMS because they found patient obtunded and altered at home 12/19, they were concerned that patient may have overdosed on her sleeping pills. Family do describe that for the past 2 days patient has been having a cough, yesterday evening she started becoming progressively lethargic and thus he called EMS.  Upon arrival a code stroke was called for AMS with CTA head neg for large vessel occlusion. In the ED patient was hypertensive and hypoxemic. The decision to intubate her was done for protection of airways. Patient admitted to ICU for AHRF 2/2 bilateral pneumonia       #Acute Hypoxemic Respiratory Failure secondary to Influenza A pneumonia  - Patient got intubated 12/19 for airway protection, extubated 12/20   finish abx and antiviral course   - On benzonatate 100mg TID PRN    #Elevated BP  - Patient on Amlodipine 10mg daily and metoprolol tartrate 25mg q8h  - 12/24: Start Lisinopril 5mg daily  - Pain control     #Left Cerebellar venous Anomaly   - MRI Brain ordered, not done yet  - Seen by neurology 12/22: Diagnostic cerebral angiogram on Tuesday 12/26  - Plan for NPO after midnight on Monday 12/25, IVF while NPO, order pre-op labs CBC/CMP/Coag in AM before procedure     # Possible Opiate withdrawal  - Continue IV morphine 2mg q6h PRN  - Consulted pain management, will follow up     #HO Fribromyalgia   - Continue amytriptyline 25mg qHS  - Consulted pain management, will follow up     #HO Anxiety/Depression  - c/o escitalopram 10mg q24      #HO Hypothyroidism           #DVT PPX: Lovenox 40mg SC daily    #GI PPX: Pantoprazole 40mg PO daily     #Diet:  Soft and bite-sized.    #Activity Order: As tolerated    #Handoff  - Started Lisinopril 5mg today   - Monitor BP  - Follow up pain management consult

## 2023-12-24 NOTE — CONSULT NOTE ADULT - SUBJECTIVE AND OBJECTIVE BOX
PAIN MANAGEMENT CONSULT NOTE    Chief Complaint:    HPI:  62 yo F with PMHx of anxiety/MDD, Hypothyroidism and fibromyalgia BIBEMS to the hospital for AMS. Family called EMS because they found patient obtunded and altered at home today, they were concerned that patient may have overdosed on her sleeping pills. Family do describe that for the past 2 days patient has been having a cough, yesterday evening she started becoming progressively lethargic and thus he called EMS. Son denies any fever chills, seizure like activity fall or trauma while at home.       Pt has chronic pain. h/o possible overtaking of sleeping medication.   ER - pt was hypertensive and hypoxemic. Pt was intubated for AHRF    Pt has h/o fibromyalgia      PAST MEDICAL & SURGICAL HISTORY:  Fibromyalgia      Eczema, unspecified type      Depression, unspecified depression type      History of surgery  RIGHT HIP REPLACEMENT      History of surgery  BACK SURGERY          FAMILY HISTORY:      SOCIAL HISTORY:  [ ] Denies Smoking, Alcohol, or Drug Use    HOME MEDICATIONS:   Please refer to initial HNP    PAIN HOME MEDICATIONS:    Allergies    No Known Allergies    Intolerances        PAIN MEDICATIONS:  acetaminophen     Tablet .. 975 milliGRAM(s) Oral every 8 hours PRN  amitriptyline 25 milliGRAM(s) Oral at bedtime  escitalopram 10 milliGRAM(s) Oral daily  morphine  - Injectable 2 milliGRAM(s) IV Push every 6 hours PRN  zolpidem 5 milliGRAM(s) Oral at bedtime PRN    Heme:  enoxaparin Injectable 40 milliGRAM(s) SubCutaneous every 24 hours    Antibiotics:  azithromycin  IVPB 500 milliGRAM(s) IV Intermittent every 24 hours  oseltamivir 75 milliGRAM(s) Oral two times a day    Cardiovascular:  amLODIPine   Tablet 10 milliGRAM(s) Oral daily  lisinopril 5 milliGRAM(s) Oral daily  metoprolol tartrate 25 milliGRAM(s) Oral every 8 hours    GI:  pantoprazole    Tablet 40 milliGRAM(s) Oral before breakfast    Endocrine:    All Other Medications:  chlorhexidine 2% Cloths 1 Application(s) Topical <User Schedule>  petrolatum white Ointment 1 Application(s) Topical every 12 hours      Vital Signs Last 24 Hrs  T(C): 36.7 (24 Dec 2023 07:45), Max: 36.7 (23 Dec 2023 15:30)  T(F): 98.1 (24 Dec 2023 07:45), Max: 98.1 (24 Dec 2023 07:45)  HR: 92 (24 Dec 2023 12:00) (79 - 94)  BP: 152/72 (24 Dec 2023 12:00) (152/72 - 203/83)  BP(mean): --  RR: 18 (24 Dec 2023 12:00) (18 - 18)  SpO2: 98% (24 Dec 2023 10:00) (98% - 98%)    Parameters below as of 24 Dec 2023 10:00  Patient On (Oxygen Delivery Method): room air        LABS:                        13.3   9.03  )-----------( 282      ( 24 Dec 2023 07:33 )             40.6     12-24    135  |  98  |  21<H>  ----------------------------<  120<H>  3.9   |  23  |  0.8    Ca    9.2      24 Dec 2023 07:33  Mg     2.2     12-24        Urinalysis Basic - ( 24 Dec 2023 07:33 )    Color: x / Appearance: x / SG: x / pH: x  Gluc: 120 mg/dL / Ketone: x  / Bili: x / Urobili: x   Blood: x / Protein: x / Nitrite: x   Leuk Esterase: x / RBC: x / WBC x   Sq Epi: x / Non Sq Epi: x / Bacteria: x      Rads  CT chest    IMPRESSION:    1.  Bilateral consolidative and groundglass opacities as above. Correlate   for infectious etiologies.  2.  Multiple thyroid hypodensities. Recommended further evaluation with   nonemergent outpatient thyroid sonogram.    Xray imaging of chest and duplex negative      PHYSICAL EXAM  GENERAL: Laying in bed  Motor exam: 5/5 b/l UE. 5/5 b/l LE  CHEST/LUNG: Clear to auscultation bilaterally  ABDOMEN: Soft, Nontender, Nondistended  EXTREMITIES:  2+ Peripheral Pulses      ASSESSMENT:   HPI:  62 yo F with PMHx of anxiety/MDD, Hypothyroidism and fibromyalgia BIBEMS to the hospital for AMS. Family called EMS because they found patient obtunded and altered at home today, they were concerned that patient may have overdosed on her sleeping pills. Family do describe that for the past 2 days patient has been having a cough, yesterday evening she started becoming progressively lethargic and thus he called EMS. Son denies any fever chills, seizure like activity fall or trauma while at home. Further history unable to be assessed at this moment.     Upon arrival a code stroke was called for AMS with CTA head neg for large vessel occlusion. In the ED patient was hypertensive and hypoxemic. The decision to intubate her was done for protection of airways. Patient admitted to ICU for AHRF 2/2 bilateral pneumonia       PLAN:   1. Acetaminophen 650mg q6h standing  2. naproxen 375mg qD PRN  3. Amitriptyline 25mg qHS  4. oxycodone 2.5mg q6h PRN for breakthrough pain  5. d/c iv narcotics    Bowel regimen: miralax / colace  Nausea ppx: zofran standing  Functional goals: oob-chair, ambulate PRN as per primary team  Physical therapy

## 2023-12-24 NOTE — OCCUPATIONAL THERAPY INITIAL EVALUATION ADULT - GENERAL OBSERVATIONS, REHAB EVAL
Pt encountered reclined in bd, + IV. Pt agreeable to bedside OT assessment, may be seen as confirmed with RN. Pt returned to bed as found, + IV.

## 2023-12-24 NOTE — PROGRESS NOTE ADULT - SUBJECTIVE AND OBJECTIVE BOX
24H events:    Patient is a 63y old Female who presents with a chief complaint of AHRF (23 Dec 2023 14:24)  Primary diagnosis of Sepsis with acute hypoxic respiratory failure      Today is 5d of hospitalization. This morning patient was seen and examined at bedside, resting comfortably in bed.    Elevated BP this morning up to 180s/60s. Patient is asymptomatic.  She received IVp Hydralazine 10mg then 5mg overnight for elevated BP.     Code Status:    Family communication:  Contact date:  Name of person contacted:  Relationship to patient:  Communication details:  What matters most:    PAST MEDICAL & SURGICAL HISTORY  Fibromyalgia    Eczema, unspecified type    Depression, unspecified depression type    History of surgery  RIGHT HIP REPLACEMENT    History of surgery  BACK SURGERY      SOCIAL HISTORY:  Social History:      ALLERGIES:  No Known Allergies    MEDICATIONS:  STANDING MEDICATIONS  albuterol/ipratropium for Nebulization 3 milliLiter(s) Nebulizer every 6 hours  amitriptyline 25 milliGRAM(s) Oral at bedtime  amLODIPine   Tablet 10 milliGRAM(s) Oral daily  azithromycin  IVPB 500 milliGRAM(s) IV Intermittent every 24 hours  cefTRIAXone   IVPB 1000 milliGRAM(s) IV Intermittent every 24 hours  chlorhexidine 2% Cloths 1 Application(s) Topical <User Schedule>  enoxaparin Injectable 40 milliGRAM(s) SubCutaneous every 24 hours  escitalopram 10 milliGRAM(s) Oral daily  lisinopril 5 milliGRAM(s) Oral daily  metoprolol tartrate 25 milliGRAM(s) Oral every 8 hours  oseltamivir 75 milliGRAM(s) Oral two times a day  pantoprazole    Tablet 40 milliGRAM(s) Oral before breakfast  petrolatum white Ointment 1 Application(s) Topical every 12 hours    PRN MEDICATIONS  acetaminophen     Tablet .. 975 milliGRAM(s) Oral every 8 hours PRN  albuterol/ipratropium for Nebulization 3 milliLiter(s) Nebulizer every 6 hours PRN  benzonatate 100 milliGRAM(s) Oral three times a day PRN  morphine  - Injectable 2 milliGRAM(s) IV Push every 6 hours PRN  zolpidem 5 milliGRAM(s) Oral at bedtime PRN    VITALS:   T(F): 98.1  HR: 87  BP: 187/86  RR: 18  SpO2: --    PHYSICAL EXAM:  GENERAL:   ( x) NAD, lying in bed comfortably     (  ) obtunded     (  ) lethargic     (  ) somnolent    HEAD:   ( x) Atraumatic     (  ) hematoma     (  ) laceration (specify location:       )     HEART:  Rate -->     (x) normal rate     (  ) bradycardic     (  ) tachycardic  Rhythm -->     (x) regular     (  ) regularly irregular     (  ) irregularly irregular  Murmurs -->     (x) normal s1s2     (  ) systolic murmur     (  ) diastolic murmur     (  ) continuous murmur      (  ) S3 present     (  ) S4 present    LUNGS:   ( x)Unlabored respirations     (  ) tachypnea  ( x) B/L air entry     (  ) decreased breath sounds in:  (location     )    ( x) no adventitious sound     (  ) crackles     (  ) wheezing      (  ) rhonchi      (specify location:       )  (  ) chest wall tenderness (specify location:       )    ABDOMEN:   ( x) Soft     (  ) tense   |   (  ) nondistended     (  ) distended   |   (  ) +BS     (  ) hypoactive bowel sounds     (  ) hyperactive bowel sounds  ( x) nontender     (  ) RUQ tenderness     (  ) RLQ tenderness     (  ) LLQ tenderness     (  ) epigastric tenderness     (  ) diffuse tenderness  (  ) Splenomegaly      (  ) Hepatomegaly      (  ) Jaundice     (  ) ecchymosis     EXTREMITIES:  ( x) Normal     (  ) Rash     (  ) ecchymosis     (  ) varicose veins      (  ) pitting edema     (  ) non-pitting edema   (  ) ulceration     (  ) gangrene:     (location:     )    NERVOUS SYSTEM:    ( x) A&Ox3     (  ) confused     (  ) lethargic      AMPAC score: 21    (  ) Indwelling Baker Catheter:   Date insterted:    Reason (  ) Critical illness     (  ) urinary retention    (  ) Accurate Ins/Outs Monitoring     (  ) CMO patient    (  ) Central Line:   Date inserted:  Location: (  ) Right IJ     (  ) Left IJ     (  ) Right Fem     (  ) Left Fem    (  ) SPC        (  ) pigtail       (  ) PEG tube       (  ) colostomy       (  ) jejunostomy  (  ) U-Dall    LABS:                        13.3   9.03  )-----------( 282      ( 24 Dec 2023 07:33 )             40.6     12-24    135  |  98  |  21<H>  ----------------------------<  120<H>  3.9   |  23  |  0.8    Ca    9.2      24 Dec 2023 07:33  Mg     2.2     12-24        Urinalysis Basic - ( 24 Dec 2023 07:33 )    Color: x / Appearance: x / SG: x / pH: x  Gluc: 120 mg/dL / Ketone: x  / Bili: x / Urobili: x   Blood: x / Protein: x / Nitrite: x   Leuk Esterase: x / RBC: x / WBC x   Sq Epi: x / Non Sq Epi: x / Bacteria: x                RADIOLOGY:  Xray Chest 1 View- PORTABLE-Routine (Xray Chest 1 View- PORTABLE-Routine in AM.) (12.23.23 @ 09:08)   Impression: No radiographic evidence of acute cardiopulmonary disease.        ASSESSMENT AND PLAN  64 yo F with PMHx of anxiety/MDD, Hypothyroidism and fibromyalgia BIBEMS to the hospital for AMS. Family called EMS because they found patient obtunded and altered at home 12/19, they were concerned that patient may have overdosed on her sleeping pills. Family do describe that for the past 2 days patient has been having a cough, yesterday evening she started becoming progressively lethargic and thus he called EMS.  Upon arrival a code stroke was called for AMS with CTA head neg for large vessel occlusion. In the ED patient was hypertensive and hypoxemic. The decision to intubate her was done for protection of airways. Patient admitted to ICU for AHRF 2/2 bilateral pneumonia       #Acute Hypoxemic Respiratory Failure secondary to Influenza A pneumonia  - Patient got intubated 12/19 for airway protection, extubated 12/20   - On Azithromycin 500 mg IV q24h and Ceftriaxone  1000 mg IV q24h (12/19 - )  - On Oseltamivir 75mg oral BID (12/20 - )  - On benzonatate 100mg TID PRN    #Elevated BP  - Patient on Amlodipine 10mg daily and metoprolol tartrate 25mg q8h  - 12/24: Start Lisinopril 5mg daily  - Pain control     #Left Cerebellar venous Anomaly   - MRI Brain ordered, not done yet  - Seen by neurology 12/22: Diagnostic cerebral angiogram on Tuesday 12/26  - Plan for NPO after midnight on Monday 12/25, IVF while NPO, order pre-op labs CBC/CMP/Coag in AM before procedure     # Possible Opiate withdrawal  - Continue IV morphine 2mg q6h PRN  - Consulted pain management, will follow up     #HO Fribromyalgia   - Continue amytriptyline 25mg qHS  - Consulted pain management, will follow up     #HO Anxiety/Depression  - c/o escitalopram 10mg q24      #HO Hypothyroidism           #DVT PPX: Lovenox 40mg SC daily    #GI PPX: Pantoprazole 40mg PO daily     #Diet:  Soft and bite-sized.    #Activity Order: As tolerated    #Handoff  - Started Lisinopril 5mg today   - Monitor BP  - Follow up pain management consult

## 2023-12-25 LAB
ALBUMIN SERPL ELPH-MCNC: 3.8 G/DL — SIGNIFICANT CHANGE UP (ref 3.5–5.2)
ALBUMIN SERPL ELPH-MCNC: 3.8 G/DL — SIGNIFICANT CHANGE UP (ref 3.5–5.2)
ALP SERPL-CCNC: 90 U/L — SIGNIFICANT CHANGE UP (ref 30–115)
ALP SERPL-CCNC: 90 U/L — SIGNIFICANT CHANGE UP (ref 30–115)
ALT FLD-CCNC: 21 U/L — SIGNIFICANT CHANGE UP (ref 0–41)
ALT FLD-CCNC: 21 U/L — SIGNIFICANT CHANGE UP (ref 0–41)
ANION GAP SERPL CALC-SCNC: 14 MMOL/L — SIGNIFICANT CHANGE UP (ref 7–14)
ANION GAP SERPL CALC-SCNC: 14 MMOL/L — SIGNIFICANT CHANGE UP (ref 7–14)
AST SERPL-CCNC: 17 U/L — SIGNIFICANT CHANGE UP (ref 0–41)
AST SERPL-CCNC: 17 U/L — SIGNIFICANT CHANGE UP (ref 0–41)
BASOPHILS # BLD AUTO: 0.03 K/UL — SIGNIFICANT CHANGE UP (ref 0–0.2)
BASOPHILS # BLD AUTO: 0.03 K/UL — SIGNIFICANT CHANGE UP (ref 0–0.2)
BASOPHILS NFR BLD AUTO: 0.3 % — SIGNIFICANT CHANGE UP (ref 0–1)
BASOPHILS NFR BLD AUTO: 0.3 % — SIGNIFICANT CHANGE UP (ref 0–1)
BILIRUB SERPL-MCNC: 0.3 MG/DL — SIGNIFICANT CHANGE UP (ref 0.2–1.2)
BILIRUB SERPL-MCNC: 0.3 MG/DL — SIGNIFICANT CHANGE UP (ref 0.2–1.2)
BUN SERPL-MCNC: 22 MG/DL — HIGH (ref 10–20)
BUN SERPL-MCNC: 22 MG/DL — HIGH (ref 10–20)
CALCIUM SERPL-MCNC: 9.2 MG/DL — SIGNIFICANT CHANGE UP (ref 8.4–10.4)
CALCIUM SERPL-MCNC: 9.2 MG/DL — SIGNIFICANT CHANGE UP (ref 8.4–10.4)
CHLORIDE SERPL-SCNC: 101 MMOL/L — SIGNIFICANT CHANGE UP (ref 98–110)
CHLORIDE SERPL-SCNC: 101 MMOL/L — SIGNIFICANT CHANGE UP (ref 98–110)
CO2 SERPL-SCNC: 23 MMOL/L — SIGNIFICANT CHANGE UP (ref 17–32)
CO2 SERPL-SCNC: 23 MMOL/L — SIGNIFICANT CHANGE UP (ref 17–32)
CREAT SERPL-MCNC: 0.8 MG/DL — SIGNIFICANT CHANGE UP (ref 0.7–1.5)
CREAT SERPL-MCNC: 0.8 MG/DL — SIGNIFICANT CHANGE UP (ref 0.7–1.5)
CULTURE RESULTS: SIGNIFICANT CHANGE UP
EGFR: 83 ML/MIN/1.73M2 — SIGNIFICANT CHANGE UP
EGFR: 83 ML/MIN/1.73M2 — SIGNIFICANT CHANGE UP
EOSINOPHIL # BLD AUTO: 0.01 K/UL — SIGNIFICANT CHANGE UP (ref 0–0.7)
EOSINOPHIL # BLD AUTO: 0.01 K/UL — SIGNIFICANT CHANGE UP (ref 0–0.7)
EOSINOPHIL NFR BLD AUTO: 0.1 % — SIGNIFICANT CHANGE UP (ref 0–8)
EOSINOPHIL NFR BLD AUTO: 0.1 % — SIGNIFICANT CHANGE UP (ref 0–8)
GLUCOSE SERPL-MCNC: 151 MG/DL — HIGH (ref 70–99)
GLUCOSE SERPL-MCNC: 151 MG/DL — HIGH (ref 70–99)
HCT VFR BLD CALC: 39.4 % — SIGNIFICANT CHANGE UP (ref 37–47)
HCT VFR BLD CALC: 39.4 % — SIGNIFICANT CHANGE UP (ref 37–47)
HGB BLD-MCNC: 12.8 G/DL — SIGNIFICANT CHANGE UP (ref 12–16)
HGB BLD-MCNC: 12.8 G/DL — SIGNIFICANT CHANGE UP (ref 12–16)
IMM GRANULOCYTES NFR BLD AUTO: 1 % — HIGH (ref 0.1–0.3)
IMM GRANULOCYTES NFR BLD AUTO: 1 % — HIGH (ref 0.1–0.3)
LYMPHOCYTES # BLD AUTO: 2.71 K/UL — SIGNIFICANT CHANGE UP (ref 1.2–3.4)
LYMPHOCYTES # BLD AUTO: 2.71 K/UL — SIGNIFICANT CHANGE UP (ref 1.2–3.4)
LYMPHOCYTES # BLD AUTO: 25.7 % — SIGNIFICANT CHANGE UP (ref 20.5–51.1)
LYMPHOCYTES # BLD AUTO: 25.7 % — SIGNIFICANT CHANGE UP (ref 20.5–51.1)
MAGNESIUM SERPL-MCNC: 2.2 MG/DL — SIGNIFICANT CHANGE UP (ref 1.8–2.4)
MAGNESIUM SERPL-MCNC: 2.2 MG/DL — SIGNIFICANT CHANGE UP (ref 1.8–2.4)
MCHC RBC-ENTMCNC: 26.1 PG — LOW (ref 27–31)
MCHC RBC-ENTMCNC: 26.1 PG — LOW (ref 27–31)
MCHC RBC-ENTMCNC: 32.5 G/DL — SIGNIFICANT CHANGE UP (ref 32–37)
MCHC RBC-ENTMCNC: 32.5 G/DL — SIGNIFICANT CHANGE UP (ref 32–37)
MCV RBC AUTO: 80.4 FL — LOW (ref 81–99)
MCV RBC AUTO: 80.4 FL — LOW (ref 81–99)
MONOCYTES # BLD AUTO: 1.13 K/UL — HIGH (ref 0.1–0.6)
MONOCYTES # BLD AUTO: 1.13 K/UL — HIGH (ref 0.1–0.6)
MONOCYTES NFR BLD AUTO: 10.7 % — HIGH (ref 1.7–9.3)
MONOCYTES NFR BLD AUTO: 10.7 % — HIGH (ref 1.7–9.3)
NEUTROPHILS # BLD AUTO: 6.55 K/UL — HIGH (ref 1.4–6.5)
NEUTROPHILS # BLD AUTO: 6.55 K/UL — HIGH (ref 1.4–6.5)
NEUTROPHILS NFR BLD AUTO: 62.2 % — SIGNIFICANT CHANGE UP (ref 42.2–75.2)
NEUTROPHILS NFR BLD AUTO: 62.2 % — SIGNIFICANT CHANGE UP (ref 42.2–75.2)
NRBC # BLD: 0 /100 WBCS — SIGNIFICANT CHANGE UP (ref 0–0)
NRBC # BLD: 0 /100 WBCS — SIGNIFICANT CHANGE UP (ref 0–0)
PLATELET # BLD AUTO: 338 K/UL — SIGNIFICANT CHANGE UP (ref 130–400)
PLATELET # BLD AUTO: 338 K/UL — SIGNIFICANT CHANGE UP (ref 130–400)
PMV BLD: 10.8 FL — HIGH (ref 7.4–10.4)
PMV BLD: 10.8 FL — HIGH (ref 7.4–10.4)
POTASSIUM SERPL-MCNC: 3.5 MMOL/L — SIGNIFICANT CHANGE UP (ref 3.5–5)
POTASSIUM SERPL-MCNC: 3.5 MMOL/L — SIGNIFICANT CHANGE UP (ref 3.5–5)
POTASSIUM SERPL-SCNC: 3.5 MMOL/L — SIGNIFICANT CHANGE UP (ref 3.5–5)
POTASSIUM SERPL-SCNC: 3.5 MMOL/L — SIGNIFICANT CHANGE UP (ref 3.5–5)
PROT SERPL-MCNC: 6.9 G/DL — SIGNIFICANT CHANGE UP (ref 6–8)
PROT SERPL-MCNC: 6.9 G/DL — SIGNIFICANT CHANGE UP (ref 6–8)
RBC # BLD: 4.9 M/UL — SIGNIFICANT CHANGE UP (ref 4.2–5.4)
RBC # BLD: 4.9 M/UL — SIGNIFICANT CHANGE UP (ref 4.2–5.4)
RBC # FLD: 14.6 % — HIGH (ref 11.5–14.5)
RBC # FLD: 14.6 % — HIGH (ref 11.5–14.5)
SODIUM SERPL-SCNC: 138 MMOL/L — SIGNIFICANT CHANGE UP (ref 135–146)
SODIUM SERPL-SCNC: 138 MMOL/L — SIGNIFICANT CHANGE UP (ref 135–146)
SPECIMEN SOURCE: SIGNIFICANT CHANGE UP
WBC # BLD: 10.54 K/UL — SIGNIFICANT CHANGE UP (ref 4.8–10.8)
WBC # BLD: 10.54 K/UL — SIGNIFICANT CHANGE UP (ref 4.8–10.8)
WBC # FLD AUTO: 10.54 K/UL — SIGNIFICANT CHANGE UP (ref 4.8–10.8)
WBC # FLD AUTO: 10.54 K/UL — SIGNIFICANT CHANGE UP (ref 4.8–10.8)

## 2023-12-25 PROCEDURE — 70551 MRI BRAIN STEM W/O DYE: CPT | Mod: 26

## 2023-12-25 PROCEDURE — 99232 SBSQ HOSP IP/OBS MODERATE 35: CPT

## 2023-12-25 RX ORDER — HYDRALAZINE HCL 50 MG
10 TABLET ORAL ONCE
Refills: 0 | Status: COMPLETED | OUTPATIENT
Start: 2023-12-25 | End: 2023-12-25

## 2023-12-25 RX ADMIN — Medication 650 MILLIGRAM(S): at 00:45

## 2023-12-25 RX ADMIN — Medication 650 MILLIGRAM(S): at 11:30

## 2023-12-25 RX ADMIN — Medication 3 MILLILITER(S): at 20:04

## 2023-12-25 RX ADMIN — AMLODIPINE BESYLATE 10 MILLIGRAM(S): 2.5 TABLET ORAL at 06:37

## 2023-12-25 RX ADMIN — Medication 10 MILLIGRAM(S): at 03:59

## 2023-12-25 RX ADMIN — Medication 25 MILLIGRAM(S): at 21:17

## 2023-12-25 RX ADMIN — ESCITALOPRAM OXALATE 10 MILLIGRAM(S): 10 TABLET, FILM COATED ORAL at 11:30

## 2023-12-25 RX ADMIN — Medication 25 MILLIGRAM(S): at 06:37

## 2023-12-25 RX ADMIN — Medication 650 MILLIGRAM(S): at 06:37

## 2023-12-25 RX ADMIN — PANTOPRAZOLE SODIUM 40 MILLIGRAM(S): 20 TABLET, DELAYED RELEASE ORAL at 06:37

## 2023-12-25 RX ADMIN — CHLORHEXIDINE GLUCONATE 1 APPLICATION(S): 213 SOLUTION TOPICAL at 06:41

## 2023-12-25 RX ADMIN — Medication 3 MILLILITER(S): at 08:36

## 2023-12-25 RX ADMIN — Medication 75 MILLIGRAM(S): at 06:37

## 2023-12-25 RX ADMIN — ZOLPIDEM TARTRATE 5 MILLIGRAM(S): 10 TABLET ORAL at 00:44

## 2023-12-25 RX ADMIN — ENOXAPARIN SODIUM 40 MILLIGRAM(S): 100 INJECTION SUBCUTANEOUS at 11:28

## 2023-12-25 RX ADMIN — LISINOPRIL 5 MILLIGRAM(S): 2.5 TABLET ORAL at 06:37

## 2023-12-25 RX ADMIN — Medication 650 MILLIGRAM(S): at 12:01

## 2023-12-25 RX ADMIN — Medication 650 MILLIGRAM(S): at 17:28

## 2023-12-25 RX ADMIN — AZITHROMYCIN 255 MILLIGRAM(S): 500 TABLET, FILM COATED ORAL at 21:15

## 2023-12-25 NOTE — PROGRESS NOTE ADULT - ASSESSMENT
#Acute Hypoxemic Respiratory Failure SP Intubation 12/19, Extubated 12/20   #Influenza A Pneumonia   #Possible superimposed bacterial infection   - s/p exubation 12/20 @ 14:30    - c/w oseltamivir 75 mg oral BID, ceftriaxone 1000 mg IV q24 hours and azithromycin 500 mg IV q24   - c/p benzonatate 100mg TID PRN    #Left Cerebellar venous Anomaly   - f/u MRI if done  - Neuro planning for diagnostic Angiogram on tuesday   NPO overnight     #) Possible Opiate withdrawal  - continue IV morphine PRN  - Chronic pain eval    #HO Fribromyalgia   - c/w amytriptyline 25mg qHS'  - morphine as needed  f/u pain management consult    #HO Hypothyroidism     #HO Anxiety/Depression  - c/o escitalopram 10mg q24    #DVT ppx: Lovenox 40mg daily  #GI ppx: protonix 40mg daily  #Diet: Soft and bite-sized. NPO overnight

## 2023-12-25 NOTE — PROGRESS NOTE ADULT - SUBJECTIVE AND OBJECTIVE BOX
Neuroendovascular Progress Note:     HPI:  The patient is a 63-year-old female with a history of anxiety/ MDD, hypothyroidism, fibromyalgia, and eczema, who presented with AMS/ lethargy with no acute findings on CTH. 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 The patient had been admitted with toxic metabolic encephalopathy vs. seizure workup and was found   Patient is a 63 year old female history anxiety/MDD, hypothyroidism, fibromyalgia, presented for AMS and lethargy. Stroke code was activated, NIHSS 21. CTH negative, CTA reporting developmental venous anomaly in the left cerebellum. Patient was placed on vEEG, intubated for airway protection, now on sedation in ICU. Admitted for sepsis with acute respiratory failure/ influenza A pneumonia s/p intubation 12/19 and extubation 12/20. CXR 12/21 showed improved lung aeration with residual disease. The risks vs. benefits of a diagnostic cerebral angiogram were discussed with the patient at bedside and with the patient's  (Judson) by phone ([phone]). On exam, the patient has no acute complaints.    Past Medical History:  Fibromyalgia  Eczema, unspecified type  Depression, unspecified depression type    24-Hour Events: None    Medication:  acetaminophen     Tablet .. 650 milliGRAM(s) Oral every 6 hours  albuterol/ipratropium for Nebulization 3 milliLiter(s) Nebulizer every 6 hours  amitriptyline 25 milliGRAM(s) Oral at bedtime  amLODIPine   Tablet 10 milliGRAM(s) Oral daily  azithromycin  IVPB 500 milliGRAM(s) IV Intermittent every 24 hours  chlorhexidine 2% Cloths 1 Application(s) Topical <User Schedule>  enoxaparin Injectable 40 milliGRAM(s) SubCutaneous every 24 hours  escitalopram 10 milliGRAM(s) Oral daily  lisinopril 5 milliGRAM(s) Oral daily  metoprolol tartrate 25 milliGRAM(s) Oral every 8 hours  pantoprazole    Tablet 40 milliGRAM(s) Oral before breakfast  petrolatum white Ointment 1 Application(s) Topical every 12 hours    No Known Allergies    Recent Vitals:  T(F): 99.4 (12-24-23 @ 15:30), Max: 99.4 (12-24-23 @ 15:30)  HR: 72 (12-25-23 @ 08:48) (72 - 88)  BP: 162/72 (12-25-23 @ 08:48) (156/68 - 192/84)  RR: 18 (12-25-23 @ 08:48) (18 - 18)  SpO2: --    Recent Labs:                        12.8   10.54 )-----------( 338      ( 25 Dec 2023 07:07 )             39.4     12-25    138  |  101  |  22<H>  ----------------------------<  151<H>  3.5   |  23  |  0.8    Ca    9.2      25 Dec 2023 07:07  Mg     2.2     12-25    TPro  6.9  /  Alb  3.8  /  TBili  0.3  /  DBili  x   /  AST  17  /  ALT  21  /  AlkPhos  90  12-25    Urinalysis Basic - ( 25 Dec 2023 07:07 )    Color: x / Appearance: x / SG: x / pH: x  Gluc: 151 mg/dL / Ketone: x  / Bili: x / Urobili: x   Blood: x / Protein: x / Nitrite: x   Leuk Esterase: x / RBC: x / WBC x   Sq Epi: x / Non Sq Epi: x / Bacteria: x    LIVER FUNCTIONS - ( 25 Dec 2023 07:07 )  Alb: 3.8 g/dL / Pro: 6.9 g/dL / ALK PHOS: 90 U/L / ALT: 21 U/L / AST: 17 U/L / GGT: x           Exam:  General: NAD  Neuro: AAOx3, following commands, moving all extremities AG without drift, sensation intact to light touch throughout, EOMI, visual fields full, no dysmetria on FTN or HTS, no dysarthria, no aphasia.    Radiology:     ACC: [id number] EXAM: CT ANGIO BRAIN STROKE PROTC IC ORDERED BY: SHARATH BECKER    ACC: [id number] EXAM: CT ANGIO NECK STROKE PROTCL IC ORDERED BY: SHARATH BECKER    ACC: [id number] EXAM: CT BRAIN PERFUSION MAPS STROKE ORDERED BY: SHARATH BECKER    PROCEDURE DATE: 12/19/2023    IMPRESSION:    1. Utilizing a CBF < 30% and Tmax > 6 seconds, no evidence of core infarct or hypoperfusion.  2. No intracranial large vessel occlusion or aneurysm.  3. Left cerebellar developmental venous anomaly.  4. Thyroid hypodensities can be further evaluated with nonemergent outpatient thyroid sonogram.    --- End of Report ---    SHIRLEY FRANKEL MD; Resident Radiologist  This document has been electronically signed.  KARLI ADAMS MD; Attending Radiologist  This document has been electronically signed. Dec 20 2023 8:26AM      Assessment:   63-year-old female with a history of anxiety/ MDD, hypothyroidism, fibromyalgia, and eczema, who presented with AMS/ lethargy with no acute findings on CTH. 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 The patient had been admitted with toxic metabolic encephalopathy vs. seizure workup and was found   Patient is a 63 year old female history anxiety/MDD, hypothyroidism, fibromyalgia, presented for AMS and lethargy. Stroke code was activated, NIHSS 21. CTH negative, CTA reporting developmental venous anomaly in the left cerebellum. Patient was placed on vEEG, intubated for airway protection, now on sedation in ICU. Admitted for sepsis with acute respiratory failure/ influenza A pneumonia s/p intubation 12/19 and extubation 12/20. CXR 12/21 showed improved lung aeration with residual disease. The risks vs. benefits of a diagnostic cerebral angiogram were discussed with the patient at bedside and with the patient's  (Judson) by phone ([phone]). On exam, the patient has no acute complaints.    Suggestions:  - The patient is tentatively scheduled for a diagnostic cerebral angiogram this coming Thursday 12/28 with Dr. Garcia. Please keep NPO except medication and on IV fluid as appropriate after midnight tonight (scheduling updates to be provided tomorrow morning--tomorrow vs. Thursday).   - Coags/ CBC/ CMP tomorrow AM  [phone] Neuroendovascular    Neuroendovascular Progress Note:     HPI:  The patient is a 63-year-old female with a history of anxiety/ MDD, hypothyroidism, fibromyalgia, and eczema, who presented with AMS/ lethargy with no acute findings on CTH. 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 The patient had been admitted with toxic metabolic encephalopathy vs. seizure workup and was found   Patient is a 63 year old female history anxiety/MDD, hypothyroidism, fibromyalgia, presented for AMS and lethargy. Stroke code was activated, NIHSS 21. CTH negative, CTA reporting developmental venous anomaly in the left cerebellum. Patient was placed on vEEG, intubated for airway protection, now on sedation in ICU. Admitted for sepsis with acute respiratory failure/ influenza A pneumonia s/p intubation 12/19 and extubation 12/20. CXR 12/21 showed improved lung aeration with residual disease. The risks vs. benefits of a diagnostic cerebral angiogram were discussed with the patient at bedside and with the patient's  (Judson) by phone ([phone]). On exam, the patient has no acute complaints.    Past Medical History:  Fibromyalgia  Eczema, unspecified type  Depression, unspecified depression type    24-Hour Events: None    Medication:  acetaminophen     Tablet .. 650 milliGRAM(s) Oral every 6 hours  albuterol/ipratropium for Nebulization 3 milliLiter(s) Nebulizer every 6 hours  amitriptyline 25 milliGRAM(s) Oral at bedtime  amLODIPine   Tablet 10 milliGRAM(s) Oral daily  azithromycin  IVPB 500 milliGRAM(s) IV Intermittent every 24 hours  chlorhexidine 2% Cloths 1 Application(s) Topical <User Schedule>  enoxaparin Injectable 40 milliGRAM(s) SubCutaneous every 24 hours  escitalopram 10 milliGRAM(s) Oral daily  lisinopril 5 milliGRAM(s) Oral daily  metoprolol tartrate 25 milliGRAM(s) Oral every 8 hours  pantoprazole    Tablet 40 milliGRAM(s) Oral before breakfast  petrolatum white Ointment 1 Application(s) Topical every 12 hours    No Known Allergies    Recent Vitals:  T(F): 99.4 (12-24-23 @ 15:30), Max: 99.4 (12-24-23 @ 15:30)  HR: 72 (12-25-23 @ 08:48) (72 - 88)  BP: 162/72 (12-25-23 @ 08:48) (156/68 - 192/84)  RR: 18 (12-25-23 @ 08:48) (18 - 18)  SpO2: --    Recent Labs:                        12.8   10.54 )-----------( 338      ( 25 Dec 2023 07:07 )             39.4     12-25    138  |  101  |  22<H>  ----------------------------<  151<H>  3.5   |  23  |  0.8    Ca    9.2      25 Dec 2023 07:07  Mg     2.2     12-25    TPro  6.9  /  Alb  3.8  /  TBili  0.3  /  DBili  x   /  AST  17  /  ALT  21  /  AlkPhos  90  12-25    Urinalysis Basic - ( 25 Dec 2023 07:07 )    Color: x / Appearance: x / SG: x / pH: x  Gluc: 151 mg/dL / Ketone: x  / Bili: x / Urobili: x   Blood: x / Protein: x / Nitrite: x   Leuk Esterase: x / RBC: x / WBC x   Sq Epi: x / Non Sq Epi: x / Bacteria: x    LIVER FUNCTIONS - ( 25 Dec 2023 07:07 )  Alb: 3.8 g/dL / Pro: 6.9 g/dL / ALK PHOS: 90 U/L / ALT: 21 U/L / AST: 17 U/L / GGT: x           Exam:  General: NAD  Neuro: AAOx3, following commands, moving all extremities AG without drift, sensation intact to light touch throughout, EOMI, visual fields full, no dysmetria on FTN or HTS, no dysarthria, no aphasia.    Radiology:     ACC: [id number] EXAM: CT ANGIO BRAIN STROKE PROTC IC ORDERED BY: SHARATH BECKER    ACC: [id number] EXAM: CT ANGIO NECK STROKE PROTCL IC ORDERED BY: SHARATH BECKER    ACC: [id number] EXAM: CT BRAIN PERFUSION MAPS STROKE ORDERED BY: SHARATH BECKER    PROCEDURE DATE: 12/19/2023    IMPRESSION:    1. Utilizing a CBF < 30% and Tmax > 6 seconds, no evidence of core infarct or hypoperfusion.  2. No intracranial large vessel occlusion or aneurysm.  3. Left cerebellar developmental venous anomaly.  4. Thyroid hypodensities can be further evaluated with nonemergent outpatient thyroid sonogram.    --- End of Report ---    SHIRLEY FRANKEL MD; Resident Radiologist  This document has been electronically signed.  KARLI ADAMS MD; Attending Radiologist  This document has been electronically signed. Dec 20 2023 8:26AM      Assessment:   63-year-old female with a history of anxiety/ MDD, hypothyroidism, fibromyalgia, and eczema, who presented with AMS/ lethargy with no acute findings on CTH. 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 The patient had been admitted with toxic metabolic encephalopathy vs. seizure workup and was found   Patient is a 63 year old female history anxiety/MDD, hypothyroidism, fibromyalgia, presented for AMS and lethargy. Stroke code was activated, NIHSS 21. CTH negative, CTA reporting developmental venous anomaly in the left cerebellum. Patient was placed on vEEG, intubated for airway protection, now on sedation in ICU. Admitted for sepsis with acute respiratory failure/ influenza A pneumonia s/p intubation 12/19 and extubation 12/20. CXR 12/21 showed improved lung aeration with residual disease. The risks vs. benefits of a diagnostic cerebral angiogram were discussed with the patient at bedside and with the patient's  (Judson) by phone ([phone]). On exam, the patient has no acute complaints.    Suggestions:  - The patient is tentatively scheduled for a diagnostic cerebral angiogram this coming Thursday 12/28 with Dr. Garcia. Please keep NPO except medication and on IV fluid as appropriate after midnight tonight (scheduling updates to be provided tomorrow morning--tomorrow vs. Thursday).   - Coags/ CBC/ CMP tomorrow AM  [id number] [facility]

## 2023-12-25 NOTE — PROGRESS NOTE ADULT - SUBJECTIVE AND OBJECTIVE BOX
SUBJECTIVE:    Patient is a 63y old Female who presents with a chief complaint of AHRF (24 Dec 2023 14:37)    Currently admitted to medicine with the primary diagnosis of Sepsis with acute hypoxic respiratory failure       Today is hospital day 6d. Blood pressure high at certain times for Angiogram tomorrow   PAST MEDICAL & SURGICAL HISTORY  Fibromyalgia    Eczema, unspecified type    Depression, unspecified depression type    History of surgery  RIGHT HIP REPLACEMENT    History of surgery  BACK SURGERY      SOCIAL HISTORY:  Negative for smoking/alcohol/drug use.     ALLERGIES:  No Known Allergies    MEDICATIONS:  STANDING MEDICATIONS  acetaminophen     Tablet .. 650 milliGRAM(s) Oral every 6 hours  albuterol/ipratropium for Nebulization 3 milliLiter(s) Nebulizer every 6 hours  amitriptyline 25 milliGRAM(s) Oral at bedtime  amLODIPine   Tablet 10 milliGRAM(s) Oral daily  azithromycin  IVPB 500 milliGRAM(s) IV Intermittent every 24 hours  chlorhexidine 2% Cloths 1 Application(s) Topical <User Schedule>  enoxaparin Injectable 40 milliGRAM(s) SubCutaneous every 24 hours  escitalopram 10 milliGRAM(s) Oral daily  lisinopril 5 milliGRAM(s) Oral daily  metoprolol tartrate 25 milliGRAM(s) Oral every 8 hours  pantoprazole    Tablet 40 milliGRAM(s) Oral before breakfast  petrolatum white Ointment 1 Application(s) Topical every 12 hours    PRN MEDICATIONS  albuterol/ipratropium for Nebulization 3 milliLiter(s) Nebulizer every 6 hours PRN  benzonatate 100 milliGRAM(s) Oral three times a day PRN  naproxen 375 milliGRAM(s) Oral daily PRN  oxyCODONE    IR 2.5 milliGRAM(s) Oral every 6 hours PRN  zolpidem 5 milliGRAM(s) Oral at bedtime PRN    VITALS:   T(F): 99.4  HR: 72  BP: 162/72  RR: 18  SpO2: --    PHYSICAL EXAM:  GEN: No acute distress  LUNGS: Clear to auscultation bilaterally   HEART: S1/S2 present.   ABD: Soft, non-tender, non-distended. Bowel sounds present      LABS:                        12.8   10.54 )-----------( 338      ( 25 Dec 2023 07:07 )             39.4     12-25    138  |  101  |  22<H>  ----------------------------<  151<H>  3.5   |  23  |  0.8    Ca    9.2      25 Dec 2023 07:07  Mg     2.2     12-25    TPro  6.9  /  Alb  3.8  /  TBili  0.3  /  DBili  x   /  AST  17  /  ALT  21  /  AlkPhos  90  12-25      Urinalysis Basic - ( 25 Dec 2023 07:07 )    Color: x / Appearance: x / SG: x / pH: x  Gluc: 151 mg/dL / Ketone: x  / Bili: x / Urobili: x   Blood: x / Protein: x / Nitrite: x   Leuk Esterase: x / RBC: x / WBC x   Sq Epi: x / Non Sq Epi: x / Bacteria: x                    RADIOLOGY:

## 2023-12-26 LAB
ANION GAP SERPL CALC-SCNC: 14 MMOL/L — SIGNIFICANT CHANGE UP (ref 7–14)
ANION GAP SERPL CALC-SCNC: 14 MMOL/L — SIGNIFICANT CHANGE UP (ref 7–14)
APTT BLD: 29.4 SEC — SIGNIFICANT CHANGE UP (ref 27–39.2)
APTT BLD: 29.4 SEC — SIGNIFICANT CHANGE UP (ref 27–39.2)
BUN SERPL-MCNC: 25 MG/DL — HIGH (ref 10–20)
BUN SERPL-MCNC: 25 MG/DL — HIGH (ref 10–20)
CALCIUM SERPL-MCNC: 9 MG/DL — SIGNIFICANT CHANGE UP (ref 8.4–10.5)
CALCIUM SERPL-MCNC: 9 MG/DL — SIGNIFICANT CHANGE UP (ref 8.4–10.5)
CHLORIDE SERPL-SCNC: 100 MMOL/L — SIGNIFICANT CHANGE UP (ref 98–110)
CHLORIDE SERPL-SCNC: 100 MMOL/L — SIGNIFICANT CHANGE UP (ref 98–110)
CO2 SERPL-SCNC: 22 MMOL/L — SIGNIFICANT CHANGE UP (ref 17–32)
CO2 SERPL-SCNC: 22 MMOL/L — SIGNIFICANT CHANGE UP (ref 17–32)
CREAT SERPL-MCNC: 0.8 MG/DL — SIGNIFICANT CHANGE UP (ref 0.7–1.5)
CREAT SERPL-MCNC: 0.8 MG/DL — SIGNIFICANT CHANGE UP (ref 0.7–1.5)
EGFR: 83 ML/MIN/1.73M2 — SIGNIFICANT CHANGE UP
EGFR: 83 ML/MIN/1.73M2 — SIGNIFICANT CHANGE UP
GLUCOSE SERPL-MCNC: 108 MG/DL — HIGH (ref 70–99)
GLUCOSE SERPL-MCNC: 108 MG/DL — HIGH (ref 70–99)
HCT VFR BLD CALC: 39.7 % — SIGNIFICANT CHANGE UP (ref 37–47)
HCT VFR BLD CALC: 39.7 % — SIGNIFICANT CHANGE UP (ref 37–47)
HGB BLD-MCNC: 13 G/DL — SIGNIFICANT CHANGE UP (ref 12–16)
HGB BLD-MCNC: 13 G/DL — SIGNIFICANT CHANGE UP (ref 12–16)
INR BLD: 0.99 RATIO — SIGNIFICANT CHANGE UP (ref 0.65–1.3)
INR BLD: 0.99 RATIO — SIGNIFICANT CHANGE UP (ref 0.65–1.3)
MCHC RBC-ENTMCNC: 26.3 PG — LOW (ref 27–31)
MCHC RBC-ENTMCNC: 26.3 PG — LOW (ref 27–31)
MCHC RBC-ENTMCNC: 32.7 G/DL — SIGNIFICANT CHANGE UP (ref 32–37)
MCHC RBC-ENTMCNC: 32.7 G/DL — SIGNIFICANT CHANGE UP (ref 32–37)
MCV RBC AUTO: 80.2 FL — LOW (ref 81–99)
MCV RBC AUTO: 80.2 FL — LOW (ref 81–99)
NRBC # BLD: 0 /100 WBCS — SIGNIFICANT CHANGE UP (ref 0–0)
NRBC # BLD: 0 /100 WBCS — SIGNIFICANT CHANGE UP (ref 0–0)
PLATELET # BLD AUTO: 353 K/UL — SIGNIFICANT CHANGE UP (ref 130–400)
PLATELET # BLD AUTO: 353 K/UL — SIGNIFICANT CHANGE UP (ref 130–400)
PMV BLD: 10.8 FL — HIGH (ref 7.4–10.4)
PMV BLD: 10.8 FL — HIGH (ref 7.4–10.4)
POTASSIUM SERPL-MCNC: 3.9 MMOL/L — SIGNIFICANT CHANGE UP (ref 3.5–5)
POTASSIUM SERPL-MCNC: 3.9 MMOL/L — SIGNIFICANT CHANGE UP (ref 3.5–5)
POTASSIUM SERPL-SCNC: 3.9 MMOL/L — SIGNIFICANT CHANGE UP (ref 3.5–5)
POTASSIUM SERPL-SCNC: 3.9 MMOL/L — SIGNIFICANT CHANGE UP (ref 3.5–5)
PROTHROM AB SERPL-ACNC: 11.3 SEC — SIGNIFICANT CHANGE UP (ref 9.95–12.87)
PROTHROM AB SERPL-ACNC: 11.3 SEC — SIGNIFICANT CHANGE UP (ref 9.95–12.87)
RBC # BLD: 4.95 M/UL — SIGNIFICANT CHANGE UP (ref 4.2–5.4)
RBC # BLD: 4.95 M/UL — SIGNIFICANT CHANGE UP (ref 4.2–5.4)
RBC # FLD: 14.2 % — SIGNIFICANT CHANGE UP (ref 11.5–14.5)
RBC # FLD: 14.2 % — SIGNIFICANT CHANGE UP (ref 11.5–14.5)
SODIUM SERPL-SCNC: 136 MMOL/L — SIGNIFICANT CHANGE UP (ref 135–146)
SODIUM SERPL-SCNC: 136 MMOL/L — SIGNIFICANT CHANGE UP (ref 135–146)
WBC # BLD: 10.94 K/UL — HIGH (ref 4.8–10.8)
WBC # BLD: 10.94 K/UL — HIGH (ref 4.8–10.8)
WBC # FLD AUTO: 10.94 K/UL — HIGH (ref 4.8–10.8)
WBC # FLD AUTO: 10.94 K/UL — HIGH (ref 4.8–10.8)

## 2023-12-26 PROCEDURE — 99233 SBSQ HOSP IP/OBS HIGH 50: CPT

## 2023-12-26 RX ORDER — LISINOPRIL 2.5 MG/1
10 TABLET ORAL DAILY
Refills: 0 | Status: DISCONTINUED | OUTPATIENT
Start: 2023-12-27 | End: 2023-12-27

## 2023-12-26 RX ORDER — LISINOPRIL 2.5 MG/1
5 TABLET ORAL ONCE
Refills: 0 | Status: COMPLETED | OUTPATIENT
Start: 2023-12-26 | End: 2023-12-26

## 2023-12-26 RX ADMIN — Medication 1 APPLICATION(S): at 18:26

## 2023-12-26 RX ADMIN — Medication 650 MILLIGRAM(S): at 18:25

## 2023-12-26 RX ADMIN — Medication 650 MILLIGRAM(S): at 05:13

## 2023-12-26 RX ADMIN — Medication 3 MILLILITER(S): at 19:28

## 2023-12-26 RX ADMIN — ENOXAPARIN SODIUM 40 MILLIGRAM(S): 100 INJECTION SUBCUTANEOUS at 11:27

## 2023-12-26 RX ADMIN — PANTOPRAZOLE SODIUM 40 MILLIGRAM(S): 20 TABLET, DELAYED RELEASE ORAL at 05:12

## 2023-12-26 RX ADMIN — ESCITALOPRAM OXALATE 10 MILLIGRAM(S): 10 TABLET, FILM COATED ORAL at 11:31

## 2023-12-26 RX ADMIN — Medication 3 MILLILITER(S): at 13:38

## 2023-12-26 RX ADMIN — AMLODIPINE BESYLATE 10 MILLIGRAM(S): 2.5 TABLET ORAL at 05:13

## 2023-12-26 RX ADMIN — LISINOPRIL 5 MILLIGRAM(S): 2.5 TABLET ORAL at 13:26

## 2023-12-26 RX ADMIN — Medication 1 APPLICATION(S): at 05:48

## 2023-12-26 RX ADMIN — Medication 650 MILLIGRAM(S): at 12:14

## 2023-12-26 RX ADMIN — Medication 25 MILLIGRAM(S): at 13:29

## 2023-12-26 RX ADMIN — Medication 650 MILLIGRAM(S): at 11:31

## 2023-12-26 RX ADMIN — Medication 25 MILLIGRAM(S): at 05:10

## 2023-12-26 RX ADMIN — Medication 25 MILLIGRAM(S): at 21:03

## 2023-12-26 RX ADMIN — LISINOPRIL 5 MILLIGRAM(S): 2.5 TABLET ORAL at 05:12

## 2023-12-26 NOTE — PROGRESS NOTE ADULT - ASSESSMENT
63 year old female history anxiety/MDD, hypothyroidism, fibromyalgia, presented for AMS and lethargy. Stroke code was activated, NIHSS 21. CTH negative, CTA reporting developmental venous anomaly in the left cerebellum. Patient was placed on vEEG, intubated for airway protection, now on sedation in ICU. Admitted for sepsis with acute respiratory failure/ influenza A pneumonia s/p intubation 12/19 and extubation 12/20. CXR 12/21 showed improved lung aeration with residual disease. Patient downgraded from ICU to 4B    #Acute Hypoxemic Respiratory Failure s/p Intubation 12/19 on and Extubated 12/20 - resolved; on room air now  #Influenza A Pneumonia - s/p tamiflu   #Possible superimposed bacterial infection - completed course of abx  - monitor off oxygen  - cough suppressant prn   - nebs prn    #Left Cerebellar venous Anomaly   - f/u MRI results from 12/25  - Neurosurgery planning for diagnostic Angiogram on Thursday; NPO after midnight on Wednesday     #HTN  -BP uncontrolled  -continue Norvasc 10mg and increase lisinopril to 10mg daily    #History of Fibromyalgia   #depression  #anxiety   #hypothyroidism   - continue home meds  - pain control  - pain management cs appreciated  - PT following     Progress Note Handoff  Pending Consults: ns follow up  Pending Tests: cerebral angio  Pending Results: mri  Family Discussion: Discussed labs, meds, mri, angiogram, PT follow up and overall plan of care with pt, her  Judson and medical staff. All questions answered   Disposition: Home_____/SNF______/Other_____/Unknown at this time_____  Spent over 55 min reviewing chart, speaking with patient/family and on coordinating patient care during interdisciplinary rounds     Please call me with any questions at extension [phone] 63 year old female history anxiety/MDD, hypothyroidism, fibromyalgia, presented for AMS and lethargy. Stroke code was activated, NIHSS 21. CTH negative, CTA reporting developmental venous anomaly in the left cerebellum. Patient was placed on vEEG, intubated for airway protection, now on sedation in ICU. Admitted for sepsis with acute respiratory failure/ influenza A pneumonia s/p intubation 12/19 and extubation 12/20. CXR 12/21 showed improved lung aeration with residual disease. Patient downgraded from ICU to 4B    #Acute Hypoxemic Respiratory Failure s/p Intubation 12/19 on and Extubated 12/20 - resolved; on room air now  #Influenza A Pneumonia - s/p tamiflu   #Possible superimposed bacterial infection - completed course of abx  - monitor off oxygen  - cough suppressant prn   - nebs prn    #Left Cerebellar venous Anomaly   - f/u MRI results from 12/25  - Neurosurgery planning for diagnostic Angiogram on Thursday; NPO after midnight on Wednesday     #HTN  -BP uncontrolled  -continue Norvasc 10mg and increase lisinopril to 10mg daily    #History of Fibromyalgia   #depression  #anxiety   #hypothyroidism   - continue home meds  - pain control  - pain management cs appreciated  - PT following     Progress Note Handoff  Pending Consults: ns follow up  Pending Tests: cerebral angio  Pending Results: mri  Family Discussion: Discussed labs, meds, mri, angiogram, PT follow up and overall plan of care with pt, her  Judson and medical staff. All questions answered   Disposition: Home_____/SNF______/Other_____/Unknown at this time_____  Spent over 55 min reviewing chart, speaking with patient/family and on coordinating patient care during interdisciplinary rounds     Please call me with any questions at extension [phone]

## 2023-12-26 NOTE — PROGRESS NOTE ADULT - SUBJECTIVE AND OBJECTIVE BOX
JOVANNI BERTRAND  63y  Female      Patient is a 63y old Female who presents with AHRF (26 Dec 2023 11:06)      INTERVAL HPI/OVERNIGHT EVENTS:  Patient seen and examined earlier this morning  lying comfortably in bed  states she is anxious but denies any other complaints   BP uncontrolled for several days    REVIEW OF SYSTEMS:  CONSTITUTIONAL: No fever, weight loss, or fatigue  EYES: No eye pain, visual disturbances, or discharge  ENMT:  No difficulty hearing, tinnitus, vertigo; No sinus or throat pain  NECK: No pain or stiffness  RESPIRATORY: No cough, wheezing, chills or hemoptysis; No shortness of breath  CARDIOVASCULAR: No chest pain, palpitations, dizziness, or leg swelling  GASTROINTESTINAL: No abdominal or epigastric pain. No nausea, vomiting, or hematemesis; No diarrhea or constipation. No melena or hematochezia.  GENITOURINARY: No dysuria, frequency, hematuria, or incontinence  NEUROLOGICAL: No headaches, memory loss, loss of strength, numbness, or tremors  SKIN: No itching, burning, rashes, or lesions   LYMPH NODES: No enlarged glands  ENDOCRINE: No heat or cold intolerance; No hair loss  MUSCULOSKELETAL: No joint pain or swelling; No muscle, back, or extremity pain  PSYCHIATRIC:  anxiety  HEME/LYMPH: No easy bruising, or bleeding gums  ALLERY AND IMMUNOLOGIC: No hives or eczema    T(C): 36.9 (12-26-23 @ 08:32), Max: 37.2 (12-26-23 @ 00:29)  HR: 83 (12-26-23 @ 08:32) (81 - 93)  BP: 183/83 (12-26-23 @ 08:32) (167/80 - 196/88)  RR: 18 (12-26-23 @ 08:32) (18 - 18)  SpO2: 97% (12-26-23 @ 08:32) (97% - 98%)    PHYSICAL EXAM:  GENERAL: NAD, well-groomed,   HEAD:  Atraumatic, Normocephalic  EYES: conjunctiva and sclera clear  ENMT: Moist mucous membranes,  No visible lesions  NECK: Supple, No JVD, Normal thyroid  NERVOUS SYSTEM:  Alert & Oriented X3, Good concentration; moves all extremities; slow to speak  CHEST/LUNG: good air entry   HEART: Regular rate and rhythm; No murmurs, rubs, or gallops  ABDOMEN: Soft, Nontender, Nondistended; Bowel sounds present  EXTREMITIES:  2+ Peripheral Pulses, No clubbing, cyanosis, or edema  LYMPH: No lymphadenopathy noted  SKIN: No rashes or lesions    Consultant(s) Notes Reviewed:  [x ] YES  [ ] NO  Care Discussed with Consultants/Other Providers [ x] YES  [ ] NO    LAB:                        13.0   10.94 )-----------( 353      ( 26 Dec 2023 06:54 )             39.7     12-26    136  |  100  |  25<H>  ----------------------------<  108<H>  3.9   |  22  |  0.8    Ca    9.0      26 Dec 2023 06:54  Mg     2.2     12-25    TPro  6.9  /  Alb  3.8  /  TBili  0.3  /  DBili  x   /  AST  17  /  ALT  21  /  AlkPhos  90  12-25    LIVER FUNCTIONS - ( 25 Dec 2023 07:07 )  Alb: 3.8 g/dL / Pro: 6.9 g/dL / ALK PHOS: 90 U/L / ALT: 21 U/L / AST: 17 U/L / GGT: x             Drug Dosing Weight  Height (cm): 160 (20 Dec 2023 10:00)  Weight (kg): 55.6 (26 Dec 2023 07:15)  BMI (kg/m2): 21.7 (26 Dec 2023 07:15)  BSA (m2): 1.57 (26 Dec 2023 07:15)    Weight (kg): 55.6 (12-26-23 @ 07:15)    Urinalysis Basic - ( 26 Dec 2023 06:54 )    Color: x / Appearance: x / SG: x / pH: x  Gluc: 108 mg/dL / Ketone: x  / Bili: x / Urobili: x   Blood: x / Protein: x / Nitrite: x   Leuk Esterase: x / RBC: x / WBC x   Sq Epi: x / Non Sq Epi: x / Bacteria: x        RADIOLOGY & ADDITIONAL TESTS:  Imaging Personally Reviewed:  [x] YES  [ ] NO    HEALTH ISSUES - PROBLEM Dx:  Acute hypoxic respiratory failure    Palliative care by specialist    Altered mental status            MEDS:  acetaminophen     Tablet .. 650 milliGRAM(s) Oral every 6 hours  albuterol/ipratropium for Nebulization 3 milliLiter(s) Nebulizer every 6 hours PRN  albuterol/ipratropium for Nebulization 3 milliLiter(s) Nebulizer every 6 hours  amitriptyline 25 milliGRAM(s) Oral at bedtime  amLODIPine   Tablet 10 milliGRAM(s) Oral daily  benzonatate 100 milliGRAM(s) Oral three times a day PRN  chlorhexidine 2% Cloths 1 Application(s) Topical <User Schedule>  enoxaparin Injectable 40 milliGRAM(s) SubCutaneous every 24 hours  escitalopram 10 milliGRAM(s) Oral daily  lisinopril 5 milliGRAM(s) Oral once  metoprolol tartrate 25 milliGRAM(s) Oral every 8 hours  naproxen 375 milliGRAM(s) Oral daily PRN  oxyCODONE    IR 2.5 milliGRAM(s) Oral every 6 hours PRN  pantoprazole    Tablet 40 milliGRAM(s) Oral before breakfast  petrolatum white Ointment 1 Application(s) Topical every 12 hours  zolpidem 5 milliGRAM(s) Oral at bedtime PRN

## 2023-12-26 NOTE — PHARMACOTHERAPY INTERVENTION NOTE - NSPHARMCOMMASP
ASP - Duration of therapy
ASP - Renal dose adjustment
ASP - De-escalation
ASP - Lab/ test recommended

## 2023-12-26 NOTE — PHARMACOTHERAPY INTERVENTION NOTE - COMMENTS
Recommended to discontinue azithromycin since patient completed 5 days of therapy for pneumonia. Patient is afebrile and WBC is 10.54 mg/dL    Shantal Simon, PharmD  Clinical Pharmacy Specialist, Infectious Diseases  Tele-Antimicrobial Stewardship Program (Tele-ASP)  Tele-ASP Phone: [phone]  Recommended to discontinue azithromycin since patient completed 5 days of therapy for pneumonia. Patient is afebrile and WBC is 10.54 mg/dL    Shantal Simon, PharmD  Clinical Pharmacy Specialist, Infectious Diseases  Tele-Antimicrobial Stewardship Program (Tele-ASP)  Tele-ASP Phone: [phone]

## 2023-12-26 NOTE — CHART NOTE - NSCHARTNOTEFT_GEN_A_CORE
Patient is for diagnostic cerebral angiogram with the neuroendovascular team this coming Thursday.   Please remove patient from NPO status this AM unless otherwise medically necessary.     NPO except meds on IVF after midnight Wednesday in preparation for Thursday AM procedure.   Discussed with Dr Garcia   [phone] Patient is for diagnostic cerebral angiogram with the neuroendovascular team this coming Thursday.   Please remove patient from NPO status this AM unless otherwise medically necessary.     NPO except meds on IVF after midnight Wednesday in preparation for Thursday AM procedure.   Discussed with Dr Garcia   [phone]

## 2023-12-26 NOTE — PROGRESS NOTE ADULT - SUBJECTIVE AND OBJECTIVE BOX
24H events:    Patient is a 63y old Female who presents with a chief complaint of AHRF (25 Dec 2023 14:25)    Primary diagnosis of Sepsis with acute hypoxic respiratory failure      Day 1:  Day 2:  Day 3:     Today is hospital day 7d. This morning patient was seen and examined at bedside, resting comfortably in bed.    No acute or major events overnight.    Code Status:    Family communication:  Contact date:  Name of person contacted:  Relationship to patient:  Communication details:  What matters most:    PAST MEDICAL & SURGICAL HISTORY  Fibromyalgia    Eczema, unspecified type    Depression, unspecified depression type    History of surgery  RIGHT HIP REPLACEMENT    History of surgery  BACK SURGERY      SOCIAL HISTORY:  Social History:      ALLERGIES:  No Known Allergies    MEDICATIONS:  STANDING MEDICATIONS  acetaminophen     Tablet .. 650 milliGRAM(s) Oral every 6 hours  albuterol/ipratropium for Nebulization 3 milliLiter(s) Nebulizer every 6 hours  amitriptyline 25 milliGRAM(s) Oral at bedtime  amLODIPine   Tablet 10 milliGRAM(s) Oral daily  chlorhexidine 2% Cloths 1 Application(s) Topical <User Schedule>  enoxaparin Injectable 40 milliGRAM(s) SubCutaneous every 24 hours  escitalopram 10 milliGRAM(s) Oral daily  lisinopril 5 milliGRAM(s) Oral daily  metoprolol tartrate 25 milliGRAM(s) Oral every 8 hours  pantoprazole    Tablet 40 milliGRAM(s) Oral before breakfast  petrolatum white Ointment 1 Application(s) Topical every 12 hours    PRN MEDICATIONS  albuterol/ipratropium for Nebulization 3 milliLiter(s) Nebulizer every 6 hours PRN  benzonatate 100 milliGRAM(s) Oral three times a day PRN  naproxen 375 milliGRAM(s) Oral daily PRN  oxyCODONE    IR 2.5 milliGRAM(s) Oral every 6 hours PRN  zolpidem 5 milliGRAM(s) Oral at bedtime PRN    VITALS:   T(F): 98.5  HR: 83  BP: 183/83  RR: 18  SpO2: 97%    PHYSICAL EXAM:  GENERAL: NAD, lying in bed comfortably       HEART: normal rate      regular     normal s1s2    LUNGS: Unlabored respirations    CTA  ABDOMEN:  Soft     nondistended     nontender      EXTREMITIES: Normal     SKIN:  No rashes or lesions       LABS:                        13.0   10.94 )-----------( 353      ( 26 Dec 2023 06:54 )             39.7     12-26    136  |  100  |  25<H>  ----------------------------<  108<H>  3.9   |  22  |  0.8    Ca    9.0      26 Dec 2023 06:54  Mg     2.2     12-25    TPro  6.9  /  Alb  3.8  /  TBili  0.3  /  DBili  x   /  AST  17  /  ALT  21  /  AlkPhos  90  12-25      Urinalysis Basic - ( 26 Dec 2023 06:54 )    Color: x / Appearance: x / SG: x / pH: x  Gluc: 108 mg/dL / Ketone: x  / Bili: x / Urobili: x   Blood: x / Protein: x / Nitrite: x   Leuk Esterase: x / RBC: x / WBC x   Sq Epi: x / Non Sq Epi: x / Bacteria: x                RADIOLOGY:

## 2023-12-26 NOTE — PROGRESS NOTE ADULT - ASSESSMENT
#Acute Hypoxemic Respiratory Failure SP Intubation 12/19, Extubated 12/20   #Influenza A Pneumonia   #Possible superimposed bacterial infection   - s/p exubation 12/20 @ 14:30    - c/w oseltamivir 75 mg oral BID, ceftriaxone 1000 mg IV q24 hours and azithromycin 500 mg IV q24   - c/p benzonatate 100mg TID PRN  -Now on room air     #Left Cerebellar venous Anomaly   - Neuro planning for diagnostic Angiogram on Thursday Preop labs and NPO wed midnight   -Fu MRI results    #HTN  -Uncontrolled  -c/w Norvasc  -Will inc Lisinopril to 5> 10mg     #) Possible Opiate withdrawal  - continue IV morphine PRN  - Chronic pain eval    #HO Fribromyalgia   - c/w amytriptyline 25mg qHS'  - morphine as needed  f/u pain management consult    #HO Hypothyroidism     #HO Anxiety/Depression  - c/o escitalopram 10mg q24    #DVT ppx: Lovenox 40mg daily  #GI ppx: protonix 40mg daily  #Diet: Soft and bite-sized

## 2023-12-27 LAB
ALBUMIN SERPL ELPH-MCNC: 3.9 G/DL — SIGNIFICANT CHANGE UP (ref 3.5–5.2)
ALBUMIN SERPL ELPH-MCNC: 3.9 G/DL — SIGNIFICANT CHANGE UP (ref 3.5–5.2)
ALP SERPL-CCNC: 79 U/L — SIGNIFICANT CHANGE UP (ref 30–115)
ALP SERPL-CCNC: 79 U/L — SIGNIFICANT CHANGE UP (ref 30–115)
ALT FLD-CCNC: 14 U/L — SIGNIFICANT CHANGE UP (ref 0–41)
ALT FLD-CCNC: 14 U/L — SIGNIFICANT CHANGE UP (ref 0–41)
ANION GAP SERPL CALC-SCNC: 12 MMOL/L — SIGNIFICANT CHANGE UP (ref 7–14)
ANION GAP SERPL CALC-SCNC: 12 MMOL/L — SIGNIFICANT CHANGE UP (ref 7–14)
AST SERPL-CCNC: 14 U/L — SIGNIFICANT CHANGE UP (ref 0–41)
AST SERPL-CCNC: 14 U/L — SIGNIFICANT CHANGE UP (ref 0–41)
BASOPHILS # BLD AUTO: 0.04 K/UL — SIGNIFICANT CHANGE UP (ref 0–0.2)
BASOPHILS # BLD AUTO: 0.04 K/UL — SIGNIFICANT CHANGE UP (ref 0–0.2)
BASOPHILS NFR BLD AUTO: 0.4 % — SIGNIFICANT CHANGE UP (ref 0–1)
BASOPHILS NFR BLD AUTO: 0.4 % — SIGNIFICANT CHANGE UP (ref 0–1)
BILIRUB SERPL-MCNC: 0.3 MG/DL — SIGNIFICANT CHANGE UP (ref 0.2–1.2)
BILIRUB SERPL-MCNC: 0.3 MG/DL — SIGNIFICANT CHANGE UP (ref 0.2–1.2)
BUN SERPL-MCNC: 25 MG/DL — HIGH (ref 10–20)
BUN SERPL-MCNC: 25 MG/DL — HIGH (ref 10–20)
CALCIUM SERPL-MCNC: 9.4 MG/DL — SIGNIFICANT CHANGE UP (ref 8.4–10.5)
CALCIUM SERPL-MCNC: 9.4 MG/DL — SIGNIFICANT CHANGE UP (ref 8.4–10.5)
CHLORIDE SERPL-SCNC: 101 MMOL/L — SIGNIFICANT CHANGE UP (ref 98–110)
CHLORIDE SERPL-SCNC: 101 MMOL/L — SIGNIFICANT CHANGE UP (ref 98–110)
CO2 SERPL-SCNC: 23 MMOL/L — SIGNIFICANT CHANGE UP (ref 17–32)
CO2 SERPL-SCNC: 23 MMOL/L — SIGNIFICANT CHANGE UP (ref 17–32)
CREAT SERPL-MCNC: 0.8 MG/DL — SIGNIFICANT CHANGE UP (ref 0.7–1.5)
CREAT SERPL-MCNC: 0.8 MG/DL — SIGNIFICANT CHANGE UP (ref 0.7–1.5)
EGFR: 83 ML/MIN/1.73M2 — SIGNIFICANT CHANGE UP
EGFR: 83 ML/MIN/1.73M2 — SIGNIFICANT CHANGE UP
EOSINOPHIL # BLD AUTO: 0.04 K/UL — SIGNIFICANT CHANGE UP (ref 0–0.7)
EOSINOPHIL # BLD AUTO: 0.04 K/UL — SIGNIFICANT CHANGE UP (ref 0–0.7)
EOSINOPHIL NFR BLD AUTO: 0.4 % — SIGNIFICANT CHANGE UP (ref 0–8)
EOSINOPHIL NFR BLD AUTO: 0.4 % — SIGNIFICANT CHANGE UP (ref 0–8)
GLUCOSE SERPL-MCNC: 117 MG/DL — HIGH (ref 70–99)
GLUCOSE SERPL-MCNC: 117 MG/DL — HIGH (ref 70–99)
HCT VFR BLD CALC: 40 % — SIGNIFICANT CHANGE UP (ref 37–47)
HCT VFR BLD CALC: 40 % — SIGNIFICANT CHANGE UP (ref 37–47)
HGB BLD-MCNC: 13.2 G/DL — SIGNIFICANT CHANGE UP (ref 12–16)
HGB BLD-MCNC: 13.2 G/DL — SIGNIFICANT CHANGE UP (ref 12–16)
IMM GRANULOCYTES NFR BLD AUTO: 1.1 % — HIGH (ref 0.1–0.3)
IMM GRANULOCYTES NFR BLD AUTO: 1.1 % — HIGH (ref 0.1–0.3)
LYMPHOCYTES # BLD AUTO: 2.85 K/UL — SIGNIFICANT CHANGE UP (ref 1.2–3.4)
LYMPHOCYTES # BLD AUTO: 2.85 K/UL — SIGNIFICANT CHANGE UP (ref 1.2–3.4)
LYMPHOCYTES # BLD AUTO: 28 % — SIGNIFICANT CHANGE UP (ref 20.5–51.1)
LYMPHOCYTES # BLD AUTO: 28 % — SIGNIFICANT CHANGE UP (ref 20.5–51.1)
MAGNESIUM SERPL-MCNC: 2.3 MG/DL — SIGNIFICANT CHANGE UP (ref 1.8–2.4)
MAGNESIUM SERPL-MCNC: 2.3 MG/DL — SIGNIFICANT CHANGE UP (ref 1.8–2.4)
MCHC RBC-ENTMCNC: 26.4 PG — LOW (ref 27–31)
MCHC RBC-ENTMCNC: 26.4 PG — LOW (ref 27–31)
MCHC RBC-ENTMCNC: 33 G/DL — SIGNIFICANT CHANGE UP (ref 32–37)
MCHC RBC-ENTMCNC: 33 G/DL — SIGNIFICANT CHANGE UP (ref 32–37)
MCV RBC AUTO: 80 FL — LOW (ref 81–99)
MCV RBC AUTO: 80 FL — LOW (ref 81–99)
MONOCYTES # BLD AUTO: 1.14 K/UL — HIGH (ref 0.1–0.6)
MONOCYTES # BLD AUTO: 1.14 K/UL — HIGH (ref 0.1–0.6)
MONOCYTES NFR BLD AUTO: 11.2 % — HIGH (ref 1.7–9.3)
MONOCYTES NFR BLD AUTO: 11.2 % — HIGH (ref 1.7–9.3)
NEUTROPHILS # BLD AUTO: 5.99 K/UL — SIGNIFICANT CHANGE UP (ref 1.4–6.5)
NEUTROPHILS # BLD AUTO: 5.99 K/UL — SIGNIFICANT CHANGE UP (ref 1.4–6.5)
NEUTROPHILS NFR BLD AUTO: 58.9 % — SIGNIFICANT CHANGE UP (ref 42.2–75.2)
NEUTROPHILS NFR BLD AUTO: 58.9 % — SIGNIFICANT CHANGE UP (ref 42.2–75.2)
NRBC # BLD: 0 /100 WBCS — SIGNIFICANT CHANGE UP (ref 0–0)
NRBC # BLD: 0 /100 WBCS — SIGNIFICANT CHANGE UP (ref 0–0)
PLATELET # BLD AUTO: 399 K/UL — SIGNIFICANT CHANGE UP (ref 130–400)
PLATELET # BLD AUTO: 399 K/UL — SIGNIFICANT CHANGE UP (ref 130–400)
PMV BLD: 10.3 FL — SIGNIFICANT CHANGE UP (ref 7.4–10.4)
PMV BLD: 10.3 FL — SIGNIFICANT CHANGE UP (ref 7.4–10.4)
POTASSIUM SERPL-MCNC: 3.9 MMOL/L — SIGNIFICANT CHANGE UP (ref 3.5–5)
POTASSIUM SERPL-MCNC: 3.9 MMOL/L — SIGNIFICANT CHANGE UP (ref 3.5–5)
POTASSIUM SERPL-SCNC: 3.9 MMOL/L — SIGNIFICANT CHANGE UP (ref 3.5–5)
POTASSIUM SERPL-SCNC: 3.9 MMOL/L — SIGNIFICANT CHANGE UP (ref 3.5–5)
PROT SERPL-MCNC: 6.9 G/DL — SIGNIFICANT CHANGE UP (ref 6–8)
PROT SERPL-MCNC: 6.9 G/DL — SIGNIFICANT CHANGE UP (ref 6–8)
RBC # BLD: 5 M/UL — SIGNIFICANT CHANGE UP (ref 4.2–5.4)
RBC # BLD: 5 M/UL — SIGNIFICANT CHANGE UP (ref 4.2–5.4)
RBC # FLD: 14.2 % — SIGNIFICANT CHANGE UP (ref 11.5–14.5)
RBC # FLD: 14.2 % — SIGNIFICANT CHANGE UP (ref 11.5–14.5)
SODIUM SERPL-SCNC: 136 MMOL/L — SIGNIFICANT CHANGE UP (ref 135–146)
SODIUM SERPL-SCNC: 136 MMOL/L — SIGNIFICANT CHANGE UP (ref 135–146)
WBC # BLD: 10.17 K/UL — SIGNIFICANT CHANGE UP (ref 4.8–10.8)
WBC # BLD: 10.17 K/UL — SIGNIFICANT CHANGE UP (ref 4.8–10.8)
WBC # FLD AUTO: 10.17 K/UL — SIGNIFICANT CHANGE UP (ref 4.8–10.8)
WBC # FLD AUTO: 10.17 K/UL — SIGNIFICANT CHANGE UP (ref 4.8–10.8)

## 2023-12-27 PROCEDURE — 99232 SBSQ HOSP IP/OBS MODERATE 35: CPT

## 2023-12-27 RX ORDER — LISINOPRIL 2.5 MG/1
10 TABLET ORAL ONCE
Refills: 0 | Status: COMPLETED | OUTPATIENT
Start: 2023-12-27 | End: 2023-12-27

## 2023-12-27 RX ORDER — LISINOPRIL 2.5 MG/1
20 TABLET ORAL DAILY
Refills: 0 | Status: DISCONTINUED | OUTPATIENT
Start: 2023-12-28 | End: 2023-12-28

## 2023-12-27 RX ORDER — SODIUM CHLORIDE 9 MG/ML
1000 INJECTION INTRAMUSCULAR; INTRAVENOUS; SUBCUTANEOUS
Refills: 0 | Status: DISCONTINUED | OUTPATIENT
Start: 2023-12-28 | End: 2023-12-29

## 2023-12-27 RX ADMIN — Medication 1 MILLIGRAM(S): at 09:29

## 2023-12-27 RX ADMIN — ENOXAPARIN SODIUM 40 MILLIGRAM(S): 100 INJECTION SUBCUTANEOUS at 12:35

## 2023-12-27 RX ADMIN — Medication 25 MILLIGRAM(S): at 15:03

## 2023-12-27 RX ADMIN — Medication 650 MILLIGRAM(S): at 12:34

## 2023-12-27 RX ADMIN — Medication 650 MILLIGRAM(S): at 23:21

## 2023-12-27 RX ADMIN — Medication 650 MILLIGRAM(S): at 05:18

## 2023-12-27 RX ADMIN — Medication 25 MILLIGRAM(S): at 21:08

## 2023-12-27 RX ADMIN — ESCITALOPRAM OXALATE 10 MILLIGRAM(S): 10 TABLET, FILM COATED ORAL at 12:34

## 2023-12-27 RX ADMIN — LISINOPRIL 10 MILLIGRAM(S): 2.5 TABLET ORAL at 09:29

## 2023-12-27 RX ADMIN — Medication 650 MILLIGRAM(S): at 17:13

## 2023-12-27 RX ADMIN — LISINOPRIL 10 MILLIGRAM(S): 2.5 TABLET ORAL at 22:11

## 2023-12-27 RX ADMIN — Medication 1 MILLIGRAM(S): at 17:12

## 2023-12-27 RX ADMIN — LISINOPRIL 10 MILLIGRAM(S): 2.5 TABLET ORAL at 05:18

## 2023-12-27 RX ADMIN — AMLODIPINE BESYLATE 10 MILLIGRAM(S): 2.5 TABLET ORAL at 05:17

## 2023-12-27 RX ADMIN — PANTOPRAZOLE SODIUM 40 MILLIGRAM(S): 20 TABLET, DELAYED RELEASE ORAL at 06:43

## 2023-12-27 RX ADMIN — Medication 25 MILLIGRAM(S): at 05:17

## 2023-12-27 RX ADMIN — Medication 3 MILLILITER(S): at 09:23

## 2023-12-27 RX ADMIN — CHLORHEXIDINE GLUCONATE 1 APPLICATION(S): 213 SOLUTION TOPICAL at 05:37

## 2023-12-27 NOTE — PROGRESS NOTE ADULT - ASSESSMENT
63 year old female history anxiety/MDD, hypothyroidism, fibromyalgia, presented for AMS and lethargy. Stroke code was activated, NIHSS 21. CTH negative, CTA reporting developmental venous anomaly in the left cerebellum. Patient was placed on vEEG, intubated for airway protection, now on sedation in ICU. Admitted for sepsis with acute respiratory failure/ influenza A pneumonia s/p intubation 12/19 and extubation 12/20. CXR 12/21 showed improved lung aeration with residual disease. Patient downgraded from ICU to 4B    #Acute Hypoxemic Respiratory Failure s/p Intubation 12/19 on and Extubated 12/20 - resolved; on room air now  #Influenza A Pneumonia - s/p tamiflu   #Possible superimposed bacterial infection - completed course of abx  - monitor off oxygen  - cough suppressant prn   - nebs prn    #Left Cerebellar venous Anomaly   - f/u MRI results from 12/25  - Neurosurgery planning for diagnostic Angiogram on Thursday; NPO except meds after midnight tonight; AM CBC, CMP, Coags, Type  + Screen ordered    #HTN Emergency on admission  #HTN  - BP uncontrolled   -continue Norvasc 10mg and increased lisinopril to 20mg daily  - 12/27- patient noted to be anxious; home med of Lorazepam 1 mg BID restarted. May have been contributing to elevated pressures  - 12/27 started HCTZ 12.5mg titrate up as needed     #History of Fibromyalgia   #depression  #anxiety   #hypothyroidism   - continue home meds --> 12/27 restarted home Lorazepam 1 mg BID  - pain control  - pain management cs appreciated  - PT following     #DVT ppx: Lovenox  #GI ppx: PPI  #Diet: Soft and bite sized; NPO after MN tonight for 12/28 angiogram tomorrow   #Activity: IAT  #Code status: Full Code  #Disposition: home when stable    Pending: BP improvement, angiogram 12/28 with neurosurgery

## 2023-12-27 NOTE — PROGRESS NOTE ADULT - SUBJECTIVE AND OBJECTIVE BOX
24H events:    Patient is a 63y old Female who presents with a chief complaint of AHRF (26 Dec 2023 13:08)    Primary diagnosis of Sepsis with acute hypoxic respiratory failure    Today is hospital day 8d. This morning patient was seen and examined at bedside, resting comfortably in bed.    No acute or major events overnight.    PAST MEDICAL & SURGICAL HISTORY  Fibromyalgia    Eczema, unspecified type    Depression, unspecified depression type    History of surgery  RIGHT HIP REPLACEMENT    History of surgery  BACK SURGERY      SOCIAL HISTORY:  Social History:      ALLERGIES:  No Known Allergies    MEDICATIONS:  STANDING MEDICATIONS  acetaminophen     Tablet .. 650 milliGRAM(s) Oral every 6 hours  albuterol/ipratropium for Nebulization 3 milliLiter(s) Nebulizer every 6 hours  amitriptyline 25 milliGRAM(s) Oral at bedtime  amLODIPine   Tablet 10 milliGRAM(s) Oral daily  chlorhexidine 2% Cloths 1 Application(s) Topical <User Schedule>  enoxaparin Injectable 40 milliGRAM(s) SubCutaneous every 24 hours  escitalopram 10 milliGRAM(s) Oral daily  LORazepam     Tablet 1 milliGRAM(s) Oral two times a day  metoprolol tartrate 25 milliGRAM(s) Oral every 8 hours  pantoprazole    Tablet 40 milliGRAM(s) Oral before breakfast  petrolatum white Ointment 1 Application(s) Topical every 12 hours    PRN MEDICATIONS  albuterol/ipratropium for Nebulization 3 milliLiter(s) Nebulizer every 6 hours PRN  benzonatate 100 milliGRAM(s) Oral three times a day PRN  naproxen 375 milliGRAM(s) Oral daily PRN  oxyCODONE    IR 2.5 milliGRAM(s) Oral every 6 hours PRN  zolpidem 5 milliGRAM(s) Oral at bedtime PRN    VITALS:   T(F): 98.5  HR: 72  BP: 188/84  RR: 19  SpO2: 98%    PHYSICAL EXAM:  GENERAL: Patient in minimal distress secondary to anxiety  (  ) NAD, lying in bed comfortably     (  ) obtunded     (  ) lethargic     (  ) somnolent    HEAD:   ( X ) Atraumatic     (  ) hematoma     (  ) laceration (specify location:       )     NECK:  ( X ) Supple     (  ) neck stiffness     (  ) nuchal rigidity     (  )  no JVD     (  ) JVD present ( -- cm)    HEART:  Rate -->     ( X ) normal rate     (  ) bradycardic     (  ) tachycardic  Rhythm -->     ( X ) regular     (  ) regularly irregular     (  ) irregularly irregular  Murmurs -->     (  ) normal s1s2     (  ) systolic murmur     (  ) diastolic murmur     (  ) continuous murmur      (  ) S3 present     (  ) S4 present    LUNGS:   ( X )Unlabored respirations     (  ) tachypnea  ( X ) B/L air entry     (  ) decreased breath sounds in:  (location     )    (  ) no adventitious sound     (  ) crackles     (  ) wheezing      (  ) rhonchi      (specify location:       )  (  ) chest wall tenderness (specify location:       )    ABDOMEN:   ( X ) Soft     (  ) tense   |   (  ) nondistended     (  ) distended   |   (  ) +BS     (  ) hypoactive bowel sounds     (  ) hyperactive bowel sounds  ( X ) nontender     (  ) RUQ tenderness     (  ) RLQ tenderness     (  ) LLQ tenderness     (  ) epigastric tenderness     (  ) diffuse tenderness  (  ) Splenomegaly      (  ) Hepatomegaly      (  ) Jaundice     (  ) ecchymosis     EXTREMITIES:  ( X ) Normal     (  ) Rash     (  ) ecchymosis     (  ) varicose veins      (  ) pitting edema     (  ) non-pitting edema   (  ) ulceration     (  ) gangrene:     (location:     )    NERVOUS SYSTEM:    ( X ) A&Ox3     (  ) confused     (  ) lethargic  CN II-XII:     (  ) Intact     (  ) deficits found     (Specify:     )   Upper extremities:     (  ) no sensorimotor deficits     (  ) weakness     (  ) loss of proprioception/vibration     (  ) loss of touch/temperature (specify:    )  Lower extremities:     (  ) no sensorimotor deficits     (  ) weakness     (  ) loss of proprioception/vibration     (  ) loss of touch/temperature (specify:    )    SKIN:   ( X ) No rashes or lesions     (  ) maculopapular rash     (  ) pustules     (  ) vesicles     (  ) ulcer     (  ) ecchymosis     (specify location:     )        LABS:                        13.2   10.17 )-----------( 399      ( 27 Dec 2023 08:02 )             40.0     12-27    136  |  101  |  25<H>  ----------------------------<  117<H>  3.9   |  23  |  0.8    Ca    9.4      27 Dec 2023 08:02  Mg     2.3     12-27    TPro  6.9  /  Alb  3.9  /  TBili  0.3  /  DBili  x   /  AST  14  /  ALT  14  /  AlkPhos  79  12-27    PT/INR - ( 26 Dec 2023 11:53 )   PT: 11.30 sec;   INR: 0.99 ratio         PTT - ( 26 Dec 2023 11:53 )  PTT:29.4 sec  Urinalysis Basic - ( 27 Dec 2023 08:02 )    Color: x / Appearance: x / SG: x / pH: x  Gluc: 117 mg/dL / Ketone: x  / Bili: x / Urobili: x   Blood: x / Protein: x / Nitrite: x   Leuk Esterase: x / RBC: x / WBC x   Sq Epi: x / Non Sq Epi: x / Bacteria: x

## 2023-12-27 NOTE — PROGRESS NOTE ADULT - SUBJECTIVE AND OBJECTIVE BOX
Neuroendovascular Progress Note:     HPI:  Patient is a 63 year old female history of anxiety/ MDD, hypothyroidism, fibromyalgia, and eczema, who presented with AMS/ lethargy with no acute findings on CTH. 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  MRI brain reporting large left cerebellar developmental venous anomaly draining to vein of thang, associated adjacent small cavernoma. No complaints this am. For diagnostic cerebral angiogram tomorrow AM.       Past medical history:   Fibromyalgia    Eczema, unspecified type    Depression, unspecified depression type        Medications:   acetaminophen     Tablet .. 650 milliGRAM(s) Oral every 6 hours  albuterol/ipratropium for Nebulization 3 milliLiter(s) Nebulizer every 6 hours  amitriptyline 25 milliGRAM(s) Oral at bedtime  amLODIPine   Tablet 10 milliGRAM(s) Oral daily  chlorhexidine 2% Cloths 1 Application(s) Topical <User Schedule>  enoxaparin Injectable 40 milliGRAM(s) SubCutaneous every 24 hours  escitalopram 10 milliGRAM(s) Oral daily  LORazepam     Tablet 1 milliGRAM(s) Oral two times a day  metoprolol tartrate 25 milliGRAM(s) Oral every 8 hours  pantoprazole    Tablet 40 milliGRAM(s) Oral before breakfast  petrolatum white Ointment 1 Application(s) Topical every 12 hours      Vitals:   T(F): 98.5 (12-27-23 @ 08:01), Max: 98.7 (12-26-23 @ 15:30)  HR: 84 (12-27-23 @ 11:00) (72 - 90)  BP: 173/74 (12-27-23 @ 11:00) (165/74 - 188/84)  RR: 18 (12-27-23 @ 11:00) (18 - 19)  SpO2: 98% (12-27-23 @ 05:00) (97% - 98%)    Labs:                         13.2   10.17 )-----------( 399      ( 27 Dec 2023 08:02 )             40.0     12-27    136  |  101  |  25<H>  ----------------------------<  117<H>  3.9   |  23  |  0.8    Ca    9.4      27 Dec 2023 08:02  Mg     2.3     12-27    TPro  6.9  /  Alb  3.9  /  TBili  0.3  /  DBili  x   /  AST  14  /  ALT  14  /  AlkPhos  79  12-27    PT/INR - ( 26 Dec 2023 11:53 )   PT: 11.30 sec;   INR: 0.99 ratio         PTT - ( 26 Dec 2023 11:53 )  PTT:29.4 sec  LIVER FUNCTIONS - ( 27 Dec 2023 08:02 )  Alb: 3.9 g/dL / Pro: 6.9 g/dL / ALK PHOS: 79 U/L / ALT: 14 U/L / AST: 14 U/L / GGT: x             Exam:   General - NAD   Neuro- AAO3, follows commands, EOMi, visual fields intact, moving all extremities to antigravity, sensation intact, no neglect, no dysmetria or dysarthria.     Radiology:   Imaging reviewed per neuroendovascular ACP/attending.   < from: MR Head No Cont (12.25.23 @ 15:53) >  IMPRESSION:    1.  Large left cerebellar developmental venous anomaly, draining to the   vein of Thang    2.  Associated adjacent small cavernoma    3.  Otherwise unremarkable study    --- End of Report ---    < end of copied text >    Assessment:   Patient is a 63 year old female history of anxiety/ MDD, hypothyroidism, fibromyalgia, and eczema, who presented with AMS/ lethargy with no acute findings on CTH. 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  MRI brain reporting large left cerebellar developmental venous anomaly draining to vein of thang, associated adjacent small cavernoma.     Suggestions:  Diagnostic cerebral angiogram tomorrow 12/28/2023   - NPO except meds after midnight   - IVF as tolerated - NS 0.9% at 75cc/hr after midnight   - AM CBC/CMP/Coag panel   - Planned 2 hr IR recovery then return to unit   - Consent obtained per patient     Discussed with Dr Garcia. Updated primary team. Patient in agreement.   [id number] neuroendovascular

## 2023-12-27 NOTE — PROGRESS NOTE ADULT - ASSESSMENT
63 year old female history anxiety/MDD, hypothyroidism, fibromyalgia, presented for AMS and lethargy. Stroke code was activated, NIHSS 21. CTH negative, CTA reporting developmental venous anomaly in the left cerebellum. Patient was placed on vEEG, intubated for airway protection, now on sedation in ICU. Admitted for sepsis with acute respiratory failure/ influenza A pneumonia s/p intubation 12/19 and extubation 12/20. CXR 12/21 showed improved lung aeration with residual disease. Patient downgraded from ICU to 4B    #Acute Hypoxemic Respiratory Failure s/p Intubation 12/19 on and Extubated 12/20 - resolved; on room air now  #Influenza A Pneumonia - s/p tamiflu   #Possible superimposed bacterial infection - completed course of abx  - monitor off oxygen  - cough suppressant prn   - nebs prn    #Left Cerebellar venous Anomaly   - f/u MRI results from 12/25  - Neurosurgery planning for diagnostic Angiogram on Thursday; NPO after midnight on Wednesday     #HTN  -BP uncontrolled  -continue Norvasc 10mg and increase lisinopril to 10mg daily    #History of Fibromyalgia   #depression  #anxiety   #hypothyroidism   - continue home meds  - pain control  - pain management cs appreciated  - PT following     #DVT ppx: Lovenox  #GI ppx: PPI  #Diet: Soft and bite sized  #Activity: IAT  #Code status: Full Code  #Disposition: home when stable    Pending: BP improvement, angiogram 12/28 with neurosurgery   63 year old female history anxiety/MDD, hypothyroidism, fibromyalgia, presented for AMS and lethargy. Stroke code was activated, NIHSS 21. CTH negative, CTA reporting developmental venous anomaly in the left cerebellum. Patient was placed on vEEG, intubated for airway protection, now on sedation in ICU. Admitted for sepsis with acute respiratory failure/ influenza A pneumonia s/p intubation 12/19 and extubation 12/20. CXR 12/21 showed improved lung aeration with residual disease. Patient downgraded from ICU to 4B    #Acute Hypoxemic Respiratory Failure s/p Intubation 12/19 on and Extubated 12/20 - resolved; on room air now  #Influenza A Pneumonia - s/p tamiflu   #Possible superimposed bacterial infection - completed course of abx  - monitor off oxygen  - cough suppressant prn   - nebs prn    #Left Cerebellar venous Anomaly   - f/u MRI results from 12/25  - Neurosurgery planning for diagnostic Angiogram on Thursday; NPO except meds after midnight tonight; AM CBC, CMP, Coags, Type  + Screen ordered    #HTN Emergency on admission  #HTN  - BP uncontrolled   -continue Norvasc 10mg and increased lisinopril to 20mg daily  - 12/27- patient noted to be anxious; home med of Lorazepam 1 mg BID restarted. May have been contributing to elevated pressures    #History of Fibromyalgia   #depression  #anxiety   #hypothyroidism   - continue home meds --> 12/27 restarted home Lorazepam 1 mg BID  - pain control  - pain management cs appreciated  - PT following     #DVT ppx: Lovenox  #GI ppx: PPI  #Diet: Soft and bite sized; NPO after MN tonight for 12/28 angiogram tomorrow   #Activity: IAT  #Code status: Full Code  #Disposition: home when stable    Pending: BP improvement, angiogram 12/28 with neurosurgery

## 2023-12-27 NOTE — PROGRESS NOTE ADULT - SUBJECTIVE AND OBJECTIVE BOX
Patient is a 63y old  Female who presents with a chief complaint of AHRF (27 Dec 2023 12:23)      Patient seen and examined at bedside.  Patient denies any chest pain or shortness of breath   ALLERGIES:  No Known Allergies    MEDICATIONS:  acetaminophen     Tablet .. 650 milliGRAM(s) Oral every 6 hours  albuterol/ipratropium for Nebulization 3 milliLiter(s) Nebulizer every 6 hours PRN  albuterol/ipratropium for Nebulization 3 milliLiter(s) Nebulizer every 6 hours  amitriptyline 25 milliGRAM(s) Oral at bedtime  amLODIPine   Tablet 10 milliGRAM(s) Oral daily  benzonatate 100 milliGRAM(s) Oral three times a day PRN  chlorhexidine 2% Cloths 1 Application(s) Topical <User Schedule>  enoxaparin Injectable 40 milliGRAM(s) SubCutaneous every 24 hours  escitalopram 10 milliGRAM(s) Oral daily  hydrochlorothiazide 12.5 milliGRAM(s) Oral daily  LORazepam     Tablet 1 milliGRAM(s) Oral two times a day  metoprolol tartrate 25 milliGRAM(s) Oral every 8 hours  naproxen 375 milliGRAM(s) Oral daily PRN  oxyCODONE    IR 2.5 milliGRAM(s) Oral every 6 hours PRN  pantoprazole    Tablet 40 milliGRAM(s) Oral before breakfast  petrolatum white Ointment 1 Application(s) Topical every 12 hours  zolpidem 5 milliGRAM(s) Oral at bedtime PRN    Vital Signs Last 24 Hrs  T(F): 98.4 (27 Dec 2023 15:00), Max: 98.5 (27 Dec 2023 08:01)  HR: 72 (27 Dec 2023 17:00) (72 - 90)  BP: 182/82 (27 Dec 2023 17:00) (165/74 - 192/84)  RR: 18 (27 Dec 2023 17:00) (18 - 19)  SpO2: 98% (27 Dec 2023 05:00) (97% - 98%)  I&O's Summary      PHYSICAL EXAM:  General: NAD, A/O x 3  ENT: MMM  Neck: Supple, No JVD  Lungs: Clear to auscultation bilaterally  Cardio: RRR, S1/S2, 2/6 systolic murmur   Abdomen: Soft, Nontender, Nondistended; Bowel sounds present  Extremities: No cyanosis, No edema    LABS:                        13.2   10.17 )-----------( 399      ( 27 Dec 2023 08:02 )             40.0     12-27    136  |  101  |  25  ----------------------------<  117  3.9   |  23  |  0.8    Ca    9.4      27 Dec 2023 08:02  Mg     2.3     12-27    TPro  6.9  /  Alb  3.9  /  TBili  0.3  /  DBili  x   /  AST  14  /  ALT  14  /  AlkPhos  79  12-27      PT/INR - ( 26 Dec 2023 11:53 )   PT: 11.30 sec;   INR: 0.99 ratio         PTT - ( 26 Dec 2023 11:53 )  PTT:29.4 sec                          Urinalysis Basic - ( 27 Dec 2023 08:02 )    Color: x / Appearance: x / SG: x / pH: x  Gluc: 117 mg/dL / Ketone: x  / Bili: x / Urobili: x   Blood: x / Protein: x / Nitrite: x   Leuk Esterase: x / RBC: x / WBC x   Sq Epi: x / Non Sq Epi: x / Bacteria: x            RADIOLOGY & ADDITIONAL TESTS:    Care Discussed with Consultants/Other Providers:

## 2023-12-28 ENCOUNTER — APPOINTMENT (OUTPATIENT)
Dept: NEUROSURGERY | Facility: HOSPITAL | Age: 63
End: 2023-12-28

## 2023-12-28 ENCOUNTER — TRANSCRIPTION ENCOUNTER (OUTPATIENT)
Age: 63
End: 2023-12-28

## 2023-12-28 LAB
ALBUMIN SERPL ELPH-MCNC: 4.1 G/DL — SIGNIFICANT CHANGE UP (ref 3.5–5.2)
ALBUMIN SERPL ELPH-MCNC: 4.1 G/DL — SIGNIFICANT CHANGE UP (ref 3.5–5.2)
ALP SERPL-CCNC: 82 U/L — SIGNIFICANT CHANGE UP (ref 30–115)
ALP SERPL-CCNC: 82 U/L — SIGNIFICANT CHANGE UP (ref 30–115)
ALT FLD-CCNC: 15 U/L — SIGNIFICANT CHANGE UP (ref 0–41)
ALT FLD-CCNC: 15 U/L — SIGNIFICANT CHANGE UP (ref 0–41)
ANION GAP SERPL CALC-SCNC: 14 MMOL/L — SIGNIFICANT CHANGE UP (ref 7–14)
APTT BLD: 29.4 SEC — SIGNIFICANT CHANGE UP (ref 27–39.2)
APTT BLD: 29.4 SEC — SIGNIFICANT CHANGE UP (ref 27–39.2)
AST SERPL-CCNC: 19 U/L — SIGNIFICANT CHANGE UP (ref 0–41)
AST SERPL-CCNC: 19 U/L — SIGNIFICANT CHANGE UP (ref 0–41)
BASOPHILS # BLD AUTO: 0.06 K/UL — SIGNIFICANT CHANGE UP (ref 0–0.2)
BASOPHILS # BLD AUTO: 0.06 K/UL — SIGNIFICANT CHANGE UP (ref 0–0.2)
BASOPHILS NFR BLD AUTO: 0.4 % — SIGNIFICANT CHANGE UP (ref 0–1)
BASOPHILS NFR BLD AUTO: 0.4 % — SIGNIFICANT CHANGE UP (ref 0–1)
BILIRUB SERPL-MCNC: 0.4 MG/DL — SIGNIFICANT CHANGE UP (ref 0.2–1.2)
BILIRUB SERPL-MCNC: 0.4 MG/DL — SIGNIFICANT CHANGE UP (ref 0.2–1.2)
BUN SERPL-MCNC: 20 MG/DL — SIGNIFICANT CHANGE UP (ref 10–20)
BUN SERPL-MCNC: 20 MG/DL — SIGNIFICANT CHANGE UP (ref 10–20)
BUN SERPL-MCNC: 21 MG/DL — HIGH (ref 10–20)
BUN SERPL-MCNC: 21 MG/DL — HIGH (ref 10–20)
CALCIUM SERPL-MCNC: 9.4 MG/DL — SIGNIFICANT CHANGE UP (ref 8.4–10.5)
CALCIUM SERPL-MCNC: 9.4 MG/DL — SIGNIFICANT CHANGE UP (ref 8.4–10.5)
CALCIUM SERPL-MCNC: 9.5 MG/DL — SIGNIFICANT CHANGE UP (ref 8.4–10.4)
CALCIUM SERPL-MCNC: 9.5 MG/DL — SIGNIFICANT CHANGE UP (ref 8.4–10.4)
CHLORIDE SERPL-SCNC: 100 MMOL/L — SIGNIFICANT CHANGE UP (ref 98–110)
CHLORIDE SERPL-SCNC: 100 MMOL/L — SIGNIFICANT CHANGE UP (ref 98–110)
CHLORIDE SERPL-SCNC: 97 MMOL/L — LOW (ref 98–110)
CHLORIDE SERPL-SCNC: 97 MMOL/L — LOW (ref 98–110)
CO2 SERPL-SCNC: 20 MMOL/L — SIGNIFICANT CHANGE UP (ref 17–32)
CO2 SERPL-SCNC: 20 MMOL/L — SIGNIFICANT CHANGE UP (ref 17–32)
CO2 SERPL-SCNC: 23 MMOL/L — SIGNIFICANT CHANGE UP (ref 17–32)
CO2 SERPL-SCNC: 23 MMOL/L — SIGNIFICANT CHANGE UP (ref 17–32)
CREAT SERPL-MCNC: 0.8 MG/DL — SIGNIFICANT CHANGE UP (ref 0.7–1.5)
CREAT SERPL-MCNC: 0.8 MG/DL — SIGNIFICANT CHANGE UP (ref 0.7–1.5)
CREAT SERPL-MCNC: 0.9 MG/DL — SIGNIFICANT CHANGE UP (ref 0.7–1.5)
CREAT SERPL-MCNC: 0.9 MG/DL — SIGNIFICANT CHANGE UP (ref 0.7–1.5)
EGFR: 72 ML/MIN/1.73M2 — SIGNIFICANT CHANGE UP
EGFR: 72 ML/MIN/1.73M2 — SIGNIFICANT CHANGE UP
EGFR: 83 ML/MIN/1.73M2 — SIGNIFICANT CHANGE UP
EGFR: 83 ML/MIN/1.73M2 — SIGNIFICANT CHANGE UP
EOSINOPHIL # BLD AUTO: 0.07 K/UL — SIGNIFICANT CHANGE UP (ref 0–0.7)
EOSINOPHIL # BLD AUTO: 0.07 K/UL — SIGNIFICANT CHANGE UP (ref 0–0.7)
EOSINOPHIL NFR BLD AUTO: 0.4 % — SIGNIFICANT CHANGE UP (ref 0–8)
EOSINOPHIL NFR BLD AUTO: 0.4 % — SIGNIFICANT CHANGE UP (ref 0–8)
GLUCOSE SERPL-MCNC: 115 MG/DL — HIGH (ref 70–99)
GLUCOSE SERPL-MCNC: 115 MG/DL — HIGH (ref 70–99)
GLUCOSE SERPL-MCNC: 136 MG/DL — HIGH (ref 70–99)
GLUCOSE SERPL-MCNC: 136 MG/DL — HIGH (ref 70–99)
HCT VFR BLD CALC: 40.6 % — SIGNIFICANT CHANGE UP (ref 37–47)
HCT VFR BLD CALC: 40.6 % — SIGNIFICANT CHANGE UP (ref 37–47)
HCT VFR BLD CALC: 42 % — SIGNIFICANT CHANGE UP (ref 37–47)
HCT VFR BLD CALC: 42 % — SIGNIFICANT CHANGE UP (ref 37–47)
HGB BLD-MCNC: 13.1 G/DL — SIGNIFICANT CHANGE UP (ref 12–16)
HGB BLD-MCNC: 13.1 G/DL — SIGNIFICANT CHANGE UP (ref 12–16)
HGB BLD-MCNC: 13.5 G/DL — SIGNIFICANT CHANGE UP (ref 12–16)
HGB BLD-MCNC: 13.5 G/DL — SIGNIFICANT CHANGE UP (ref 12–16)
IMM GRANULOCYTES NFR BLD AUTO: 0.8 % — HIGH (ref 0.1–0.3)
IMM GRANULOCYTES NFR BLD AUTO: 0.8 % — HIGH (ref 0.1–0.3)
INR BLD: 0.98 RATIO — SIGNIFICANT CHANGE UP (ref 0.65–1.3)
INR BLD: 0.98 RATIO — SIGNIFICANT CHANGE UP (ref 0.65–1.3)
LYMPHOCYTES # BLD AUTO: 22.1 % — SIGNIFICANT CHANGE UP (ref 20.5–51.1)
LYMPHOCYTES # BLD AUTO: 22.1 % — SIGNIFICANT CHANGE UP (ref 20.5–51.1)
LYMPHOCYTES # BLD AUTO: 3.5 K/UL — HIGH (ref 1.2–3.4)
LYMPHOCYTES # BLD AUTO: 3.5 K/UL — HIGH (ref 1.2–3.4)
MAGNESIUM SERPL-MCNC: 2.1 MG/DL — SIGNIFICANT CHANGE UP (ref 1.8–2.4)
MAGNESIUM SERPL-MCNC: 2.1 MG/DL — SIGNIFICANT CHANGE UP (ref 1.8–2.4)
MCHC RBC-ENTMCNC: 26.1 PG — LOW (ref 27–31)
MCHC RBC-ENTMCNC: 32.1 G/DL — SIGNIFICANT CHANGE UP (ref 32–37)
MCHC RBC-ENTMCNC: 32.1 G/DL — SIGNIFICANT CHANGE UP (ref 32–37)
MCHC RBC-ENTMCNC: 32.3 G/DL — SIGNIFICANT CHANGE UP (ref 32–37)
MCHC RBC-ENTMCNC: 32.3 G/DL — SIGNIFICANT CHANGE UP (ref 32–37)
MCV RBC AUTO: 81 FL — SIGNIFICANT CHANGE UP (ref 81–99)
MCV RBC AUTO: 81 FL — SIGNIFICANT CHANGE UP (ref 81–99)
MCV RBC AUTO: 81.2 FL — SIGNIFICANT CHANGE UP (ref 81–99)
MCV RBC AUTO: 81.2 FL — SIGNIFICANT CHANGE UP (ref 81–99)
MONOCYTES # BLD AUTO: 1.13 K/UL — HIGH (ref 0.1–0.6)
MONOCYTES # BLD AUTO: 1.13 K/UL — HIGH (ref 0.1–0.6)
MONOCYTES NFR BLD AUTO: 7.1 % — SIGNIFICANT CHANGE UP (ref 1.7–9.3)
MONOCYTES NFR BLD AUTO: 7.1 % — SIGNIFICANT CHANGE UP (ref 1.7–9.3)
NEUTROPHILS # BLD AUTO: 10.96 K/UL — HIGH (ref 1.4–6.5)
NEUTROPHILS # BLD AUTO: 10.96 K/UL — HIGH (ref 1.4–6.5)
NEUTROPHILS NFR BLD AUTO: 69.2 % — SIGNIFICANT CHANGE UP (ref 42.2–75.2)
NEUTROPHILS NFR BLD AUTO: 69.2 % — SIGNIFICANT CHANGE UP (ref 42.2–75.2)
NRBC # BLD: 0 /100 WBCS — SIGNIFICANT CHANGE UP (ref 0–0)
PLATELET # BLD AUTO: 347 K/UL — SIGNIFICANT CHANGE UP (ref 130–400)
PLATELET # BLD AUTO: 347 K/UL — SIGNIFICANT CHANGE UP (ref 130–400)
PLATELET # BLD AUTO: 558 K/UL — HIGH (ref 130–400)
PLATELET # BLD AUTO: 558 K/UL — HIGH (ref 130–400)
PMV BLD: 10.6 FL — HIGH (ref 7.4–10.4)
PMV BLD: 10.6 FL — HIGH (ref 7.4–10.4)
PMV BLD: 11.9 FL — HIGH (ref 7.4–10.4)
PMV BLD: 11.9 FL — HIGH (ref 7.4–10.4)
POTASSIUM SERPL-MCNC: 3.9 MMOL/L — SIGNIFICANT CHANGE UP (ref 3.5–5)
POTASSIUM SERPL-MCNC: 3.9 MMOL/L — SIGNIFICANT CHANGE UP (ref 3.5–5)
POTASSIUM SERPL-MCNC: 4.4 MMOL/L — SIGNIFICANT CHANGE UP (ref 3.5–5)
POTASSIUM SERPL-MCNC: 4.4 MMOL/L — SIGNIFICANT CHANGE UP (ref 3.5–5)
POTASSIUM SERPL-SCNC: 3.9 MMOL/L — SIGNIFICANT CHANGE UP (ref 3.5–5)
POTASSIUM SERPL-SCNC: 3.9 MMOL/L — SIGNIFICANT CHANGE UP (ref 3.5–5)
POTASSIUM SERPL-SCNC: 4.4 MMOL/L — SIGNIFICANT CHANGE UP (ref 3.5–5)
POTASSIUM SERPL-SCNC: 4.4 MMOL/L — SIGNIFICANT CHANGE UP (ref 3.5–5)
PROT SERPL-MCNC: 7.3 G/DL — SIGNIFICANT CHANGE UP (ref 6–8)
PROT SERPL-MCNC: 7.3 G/DL — SIGNIFICANT CHANGE UP (ref 6–8)
PROTHROM AB SERPL-ACNC: 11.2 SEC — SIGNIFICANT CHANGE UP (ref 9.95–12.87)
PROTHROM AB SERPL-ACNC: 11.2 SEC — SIGNIFICANT CHANGE UP (ref 9.95–12.87)
RBC # BLD: 5.01 M/UL — SIGNIFICANT CHANGE UP (ref 4.2–5.4)
RBC # BLD: 5.01 M/UL — SIGNIFICANT CHANGE UP (ref 4.2–5.4)
RBC # BLD: 5.17 M/UL — SIGNIFICANT CHANGE UP (ref 4.2–5.4)
RBC # BLD: 5.17 M/UL — SIGNIFICANT CHANGE UP (ref 4.2–5.4)
RBC # FLD: 14.1 % — SIGNIFICANT CHANGE UP (ref 11.5–14.5)
RBC # FLD: 14.1 % — SIGNIFICANT CHANGE UP (ref 11.5–14.5)
RBC # FLD: 14.2 % — SIGNIFICANT CHANGE UP (ref 11.5–14.5)
RBC # FLD: 14.2 % — SIGNIFICANT CHANGE UP (ref 11.5–14.5)
SODIUM SERPL-SCNC: 134 MMOL/L — LOW (ref 135–146)
WBC # BLD: 11.01 K/UL — HIGH (ref 4.8–10.8)
WBC # BLD: 11.01 K/UL — HIGH (ref 4.8–10.8)
WBC # BLD: 15.84 K/UL — HIGH (ref 4.8–10.8)
WBC # BLD: 15.84 K/UL — HIGH (ref 4.8–10.8)
WBC # FLD AUTO: 11.01 K/UL — HIGH (ref 4.8–10.8)
WBC # FLD AUTO: 11.01 K/UL — HIGH (ref 4.8–10.8)
WBC # FLD AUTO: 15.84 K/UL — HIGH (ref 4.8–10.8)
WBC # FLD AUTO: 15.84 K/UL — HIGH (ref 4.8–10.8)

## 2023-12-28 PROCEDURE — 76937 US GUIDE VASCULAR ACCESS: CPT | Mod: 26

## 2023-12-28 PROCEDURE — 99232 SBSQ HOSP IP/OBS MODERATE 35: CPT

## 2023-12-28 PROCEDURE — 36226 PLACE CATH VERTEBRAL ART: CPT | Mod: LT

## 2023-12-28 PROCEDURE — 76377 3D RENDER W/INTRP POSTPROCES: CPT | Mod: 26

## 2023-12-28 PROCEDURE — 36227 PLACE CATH XTRNL CAROTID: CPT | Mod: 50

## 2023-12-28 PROCEDURE — 36224 PLACE CATH CAROTD ART: CPT | Mod: 50

## 2023-12-28 RX ORDER — LISINOPRIL 2.5 MG/1
20 TABLET ORAL ONCE
Refills: 0 | Status: COMPLETED | OUTPATIENT
Start: 2023-12-28 | End: 2023-12-28

## 2023-12-28 RX ORDER — LISINOPRIL 2.5 MG/1
20 TABLET ORAL DAILY
Refills: 0 | Status: DISCONTINUED | OUTPATIENT
Start: 2023-12-29 | End: 2023-12-29

## 2023-12-28 RX ADMIN — CHLORHEXIDINE GLUCONATE 1 APPLICATION(S): 213 SOLUTION TOPICAL at 05:28

## 2023-12-28 RX ADMIN — Medication 25 MILLIGRAM(S): at 22:29

## 2023-12-28 RX ADMIN — LISINOPRIL 20 MILLIGRAM(S): 2.5 TABLET ORAL at 16:35

## 2023-12-28 RX ADMIN — Medication 650 MILLIGRAM(S): at 18:07

## 2023-12-28 RX ADMIN — Medication 1 MILLIGRAM(S): at 18:06

## 2023-12-28 RX ADMIN — Medication 25 MILLIGRAM(S): at 22:28

## 2023-12-28 RX ADMIN — Medication 650 MILLIGRAM(S): at 05:54

## 2023-12-28 RX ADMIN — Medication 650 MILLIGRAM(S): at 23:00

## 2023-12-28 RX ADMIN — Medication 650 MILLIGRAM(S): at 05:14

## 2023-12-28 RX ADMIN — Medication 650 MILLIGRAM(S): at 04:32

## 2023-12-28 RX ADMIN — PANTOPRAZOLE SODIUM 40 MILLIGRAM(S): 20 TABLET, DELAYED RELEASE ORAL at 05:14

## 2023-12-28 RX ADMIN — Medication 650 MILLIGRAM(S): at 16:35

## 2023-12-28 RX ADMIN — Medication 1 MILLIGRAM(S): at 05:14

## 2023-12-28 RX ADMIN — Medication 3 MILLILITER(S): at 19:46

## 2023-12-28 NOTE — PRE-ANESTHESIA EVALUATION ADULT - NSANTHADDINFOFT_GEN_ALL_CORE
MAC vs GA planned; Risks discussed including dental injury and more serious complications including cardiac and pulmonary complications and stroke.  Patient expresses understanding with regard to risks of anesthesia and wishes to proceed.

## 2023-12-28 NOTE — PROGRESS NOTE ADULT - ASSESSMENT
63 year old female history anxiety/MDD, hypothyroidism, fibromyalgia, presented for AMS and lethargy. Stroke code was activated, NIHSS 21. CTH negative, CTA reporting developmental venous anomaly in the left cerebellum. Patient was placed on vEEG, intubated for airway protection, now on sedation in ICU. Admitted for sepsis with acute respiratory failure/ influenza A pneumonia s/p intubation 12/19 and extubation 12/20. CXR 12/21 showed improved lung aeration with residual disease. Patient downgraded from ICU to 4B    #Acute Hypoxemic Respiratory Failure s/p Intubation 12/19 on and Extubated 12/20 - resolved; on room air now  #Influenza A Pneumonia - s/p tamiflu   #Possible superimposed bacterial infection - completed course of abx  - monitor off oxygen  - cough suppressant prn   - nebs prn    #Left Cerebellar venous Anomaly   - f/u MRI results from 12/25  - Neurosurgery did cerebellar angiogram 12/28 - no AV anomaly, no further recs     #HTN Emergency on admission  #HTN urgency currently   - BP uncontrolled   -continue Norvasc 10mg and increased lisinopril to 20mg daily  - 12/27- patient noted to be anxious; home med of Lorazepam 1 mg BID restarted. May have been contributing to elevated pressures  - 12/27 started HCTZ 12.5mg titrate up as needed   - 12/28 - pt missed multiple doses of bp med due to procedure, increase HCTZ 25mg     #History of Fibromyalgia   #depression  #anxiety   #hypothyroidism   - continue home meds --> 12/27 restarted home Lorazepam 1 mg BID  - pain control  - pain management cs appreciated  - PT following     #DVT ppx: Lovenox  #GI ppx: PPI  #Diet: Soft and bite sized  #Activity: IAT  #Code status: Full Code  #Disposition: home when stable    Pending: BP improvement

## 2023-12-28 NOTE — PRE PROCEDURE NOTE - PRE PROCEDURE EVALUATION
HPI:  Patient is a 63 year old female history of anxiety/ MDD, hypothyroidism, fibromyalgia, and eczema, who presented with AMS/ lethargy with no acute findings on CTH. 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  MRI brain reporting large left cerebellar developmental venous anomaly draining to vein of daly, associated adjacent small cavernoma. Patient for diagnostic cerebral angiogram with the neuroendovascular team today.     NPO except meds since midnight.   IVF NS @ 50cc/hr  NIHSS 0    Allergies: No Known Allergies      PAST MEDICAL & SURGICAL HISTORY:  Fibromyalgia      Eczema, unspecified type      Depression, unspecified depression type      History of surgery  RIGHT HIP REPLACEMENT      History of surgery  BACK SURGERY          Current Medications: acetaminophen     Tablet .. 650 milliGRAM(s) Oral every 6 hours  albuterol/ipratropium for Nebulization 3 milliLiter(s) Nebulizer every 6 hours PRN  albuterol/ipratropium for Nebulization 3 milliLiter(s) Nebulizer every 6 hours  amitriptyline 25 milliGRAM(s) Oral at bedtime  amLODIPine   Tablet 10 milliGRAM(s) Oral daily  benzonatate 100 milliGRAM(s) Oral three times a day PRN  chlorhexidine 2% Cloths 1 Application(s) Topical <User Schedule>  enoxaparin Injectable 40 milliGRAM(s) SubCutaneous every 24 hours  escitalopram 10 milliGRAM(s) Oral daily  hydrochlorothiazide 12.5 milliGRAM(s) Oral daily  lisinopril 20 milliGRAM(s) Oral daily  LORazepam     Tablet 1 milliGRAM(s) Oral two times a day  metoprolol tartrate 25 milliGRAM(s) Oral every 8 hours  naproxen 375 milliGRAM(s) Oral daily PRN  oxyCODONE    IR 2.5 milliGRAM(s) Oral every 6 hours PRN  pantoprazole    Tablet 40 milliGRAM(s) Oral before breakfast  petrolatum white Ointment 1 Application(s) Topical every 12 hours  sodium chloride 0.9%. 1000 milliLiter(s) IV Continuous <Continuous>  zolpidem 5 milliGRAM(s) Oral at bedtime PRN      Labs:                         13.5   11.01 )-----------( 347      ( 28 Dec 2023 07:39 )             42.0     12-28    134<L>  |  100  |  21<H>  ----------------------------<  115<H>  4.4   |  20  |  0.8    Ca    9.4      28 Dec 2023 07:39  Mg     2.1     12-28    TPro  7.3  /  Alb  4.1  /  TBili  0.4  /  DBili  x   /  AST  19  /  ALT  15  /  AlkPhos  82  12-28      Blood Bank: 12-28-23  --  A POS  --      Assessment/Plan:   This is a 63y  year old female presents with left cerebellar venous anomaly / cavernoma.   Patient presents to neuro-IR for diagnostic cerebral angiogram.   Procedure, goals, risks, benefits and alternatives  were discussed with patient and patient's family.  All questions were answered to best understanding.   Risks discussed include but are not limited to stroke, vessel injury, hemorrhage, and or arteriotomy site hematoma.   Patient / proxy demonstrates understanding  of all risks involved with this procedure and wishes to continue.     Appropriate consent was obtained and placed in the patient's chart.   [id number]

## 2023-12-28 NOTE — CHART NOTE - NSCHARTNOTEFT_GEN_A_CORE
PACU ANESTHESIA ADMISSION NOTE      Procedure: cerebral angiogram  Post op diagnosis:  cerebellar cavernoma    ____  Intubated  TV:______       Rate: ______      FiO2: ______    __x__  Patent Airway    __x__  Full return of protective reflexes    __x__  Full recovery from anesthesia / back to baseline status    Vitals:  T(C): 36.2 (12-28-23 @ 10:32), Max: 36.9 (12-27-23 @ 15:00)  HR: 85 (12-28-23 @ 10:32) (65 - 85)  BP: 158/77 (12-28-23 @ 10:32) (137/65 - 192/84)  RR: 16 (12-28-23 @ 10:32) (16 - 18)  SpO2: 98% (12-28-23 @ 10:32) (98% - 98%)    Mental Status:  __x__ Awake   ___x__ Alert   _____ Drowsy   _____ Sedated    Nausea/Vomiting:  __x__ NO  ______Yes,   See Post - Op Orders          Pain Scale (0-10):  _____    Treatment: ____ None    __x__ See Post - Op/PCA Orders    Post - Operative Fluids:   ____ Oral   __x__ See Post - Op Orders    Plan: Discharge:   ____Home       __x___Floor     _____Critical Care    _____  Other:_________________    Comments: Patient had smooth intraoperative event, no anesthesia complication.  PACU Vital signs: HR:   85         BP:        167/77          RR:    16         O2 Sat:       100%     Temp 97.0

## 2023-12-28 NOTE — PROGRESS NOTE ADULT - SUBJECTIVE AND OBJECTIVE BOX
Patient is a 63y old  Female who presents with a chief complaint of AHRF (28 Dec 2023 12:26)      Patient seen and examined at bedside.  Patient denies any chest pain or shortness of breath   ALLERGIES:  No Known Allergies    MEDICATIONS:  acetaminophen     Tablet .. 650 milliGRAM(s) Oral every 6 hours  albuterol/ipratropium for Nebulization 3 milliLiter(s) Nebulizer every 6 hours PRN  albuterol/ipratropium for Nebulization 3 milliLiter(s) Nebulizer every 6 hours  amitriptyline 25 milliGRAM(s) Oral at bedtime  amLODIPine   Tablet 10 milliGRAM(s) Oral daily  benzonatate 100 milliGRAM(s) Oral three times a day PRN  chlorhexidine 2% Cloths 1 Application(s) Topical <User Schedule>  enoxaparin Injectable 40 milliGRAM(s) SubCutaneous every 24 hours  escitalopram 10 milliGRAM(s) Oral daily  LORazepam     Tablet 1 milliGRAM(s) Oral two times a day  metoprolol tartrate 25 milliGRAM(s) Oral every 8 hours  naproxen 375 milliGRAM(s) Oral daily PRN  oxyCODONE    IR 2.5 milliGRAM(s) Oral every 6 hours PRN  pantoprazole    Tablet 40 milliGRAM(s) Oral before breakfast  petrolatum white Ointment 1 Application(s) Topical every 12 hours  sodium chloride 0.9%. 1000 milliLiter(s) IV Continuous <Continuous>  zolpidem 5 milliGRAM(s) Oral at bedtime PRN    Vital Signs Last 24 Hrs  T(F): 98.3 (28 Dec 2023 15:30), Max: 98.3 (28 Dec 2023 15:30)  HR: 85 (28 Dec 2023 15:30) (65 - 92)  BP: 182/82 (28 Dec 2023 15:30) (137/65 - 191/90)  RR: 18 (28 Dec 2023 15:30) (16 - 18)  SpO2: 98% (28 Dec 2023 13:45) (96% - 98%)  I&O's Summary      PHYSICAL EXAM:  General: NAD, A/O x 3  ENT: MMM  Neck: Supple, No JVD  Lungs: Clear to auscultation bilaterally  Cardio: RRR, S1/S2, No murmurs  Abdomen: Soft, Nontender, Nondistended; Bowel sounds present  Extremities: No cyanosis, No edema    LABS:                        13.5   11.01 )-----------( 347      ( 28 Dec 2023 07:39 )             42.0     12-28    134  |  100  |  21  ----------------------------<  115  4.4   |  20  |  0.8    Ca    9.4      28 Dec 2023 07:39  Mg     2.1     12-28    TPro  7.3  /  Alb  4.1  /  TBili  0.4  /  DBili  x   /  AST  19  /  ALT  15  /  AlkPhos  82  12-28      PT/INR - ( 28 Dec 2023 07:39 )   PT: 11.20 sec;   INR: 0.98 ratio         PTT - ( 28 Dec 2023 07:39 )  PTT:29.4 sec                          Urinalysis Basic - ( 28 Dec 2023 07:39 )    Color: x / Appearance: x / SG: x / pH: x  Gluc: 115 mg/dL / Ketone: x  / Bili: x / Urobili: x   Blood: x / Protein: x / Nitrite: x   Leuk Esterase: x / RBC: x / WBC x   Sq Epi: x / Non Sq Epi: x / Bacteria: x            RADIOLOGY & ADDITIONAL TESTS:    Care Discussed with Consultants/Other Providers:

## 2023-12-28 NOTE — PROGRESS NOTE ADULT - SUBJECTIVE AND OBJECTIVE BOX
24H events:    Patient is a 63y old Female who presents with a chief complaint of AHRF (27 Dec 2023 17:38)    Primary diagnosis of Sepsis with acute hypoxic respiratory failure      Day 1:  Day 2:  Day 3:     Today is hospital day 9d. This morning patient was seen and examined at bedside, resting comfortably in bed.    No acute or major events overnight.    Code Status:    Family communication:  Contact date:  Name of person contacted:  Relationship to patient:  Communication details:  What matters most:    PAST MEDICAL & SURGICAL HISTORY  Fibromyalgia    Eczema, unspecified type    Depression, unspecified depression type    History of surgery  RIGHT HIP REPLACEMENT    History of surgery  BACK SURGERY      SOCIAL HISTORY:  Social History:      ALLERGIES:  No Known Allergies    MEDICATIONS:  STANDING MEDICATIONS  acetaminophen     Tablet .. 650 milliGRAM(s) Oral every 6 hours  albuterol/ipratropium for Nebulization 3 milliLiter(s) Nebulizer every 6 hours  amitriptyline 25 milliGRAM(s) Oral at bedtime  amLODIPine   Tablet 10 milliGRAM(s) Oral daily  chlorhexidine 2% Cloths 1 Application(s) Topical <User Schedule>  enoxaparin Injectable 40 milliGRAM(s) SubCutaneous every 24 hours  escitalopram 10 milliGRAM(s) Oral daily  hydrochlorothiazide 12.5 milliGRAM(s) Oral daily  lisinopril 20 milliGRAM(s) Oral daily  LORazepam     Tablet 1 milliGRAM(s) Oral two times a day  metoprolol tartrate 25 milliGRAM(s) Oral every 8 hours  pantoprazole    Tablet 40 milliGRAM(s) Oral before breakfast  petrolatum white Ointment 1 Application(s) Topical every 12 hours  sodium chloride 0.9%. 1000 milliLiter(s) IV Continuous <Continuous>    PRN MEDICATIONS  albuterol/ipratropium for Nebulization 3 milliLiter(s) Nebulizer every 6 hours PRN  benzonatate 100 milliGRAM(s) Oral three times a day PRN  naproxen 375 milliGRAM(s) Oral daily PRN  oxyCODONE    IR 2.5 milliGRAM(s) Oral every 6 hours PRN  zolpidem 5 milliGRAM(s) Oral at bedtime PRN    VITALS:   T(F): 97  HR: 86  BP: 171/77  RR: 16  SpO2: 96%    PHYSICAL EXAM:  GENERAL:   (  x ) NAD, lying in bed comfortably     (  ) obtunded     (  ) lethargic     (  ) somnolent    HEAD:   ( X ) Atraumatic     (  ) hematoma     (  ) laceration (specify location:       )     NECK:  ( X ) Supple     (  ) neck stiffness     (  ) nuchal rigidity     (  )  no JVD     (  ) JVD present ( -- cm)    HEART:  Rate -->     ( X ) normal rate     (  ) bradycardic     (  ) tachycardic  Rhythm -->     ( X ) regular     (  ) regularly irregular     (  ) irregularly irregular  Murmurs -->     (  ) normal s1s2     (  ) systolic murmur     (  ) diastolic murmur     (  ) continuous murmur      (  ) S3 present     (  ) S4 present    LUNGS:   ( X )Unlabored respirations     (  ) tachypnea  ( X ) B/L air entry     (  ) decreased breath sounds in:  (location     )    (  ) no adventitious sound     (  ) crackles     (  ) wheezing      (  ) rhonchi      (specify location:       )  (  ) chest wall tenderness (specify location:       )    ABDOMEN:   ( X ) Soft     (  ) tense   |   (  ) nondistended     (  ) distended   |   (  ) +BS     (  ) hypoactive bowel sounds     (  ) hyperactive bowel sounds  ( X ) nontender     (  ) RUQ tenderness     (  ) RLQ tenderness     (  ) LLQ tenderness     (  ) epigastric tenderness     (  ) diffuse tenderness  (  ) Splenomegaly      (  ) Hepatomegaly      (  ) Jaundice     (  ) ecchymosis     EXTREMITIES:  ( X ) Normal     (  ) Rash     (  ) ecchymosis     (  ) varicose veins      (  ) pitting edema     (  ) non-pitting edema   (  ) ulceration     (  ) gangrene:     (location:     )    NERVOUS SYSTEM:    ( X ) A&Ox3     (  ) confused     (  ) lethargic  CN II-XII:     (  ) Intact     (  ) deficits found     (Specify:     )   Upper extremities:     (  ) no sensorimotor deficits     (  ) weakness     (  ) loss of proprioception/vibration     (  ) loss of touch/temperature (specify:    )  Lower extremities:     (  ) no sensorimotor deficits     (  ) weakness     (  ) loss of proprioception/vibration     (  ) loss of touch/temperature (specify:    )    SKIN:   ( X ) No rashes or lesions     (  ) maculopapular rash     (  ) pustules     (  ) vesicles     (  ) ulcer     (  ) ecchymosis     (specify location:     )    LABS:                        13.5   11.01 )-----------( 347      ( 28 Dec 2023 07:39 )             42.0     12-28    134<L>  |  100  |  21<H>  ----------------------------<  115<H>  4.4   |  20  |  0.8    Ca    9.4      28 Dec 2023 07:39  Mg     2.1     12-28    TPro  7.3  /  Alb  4.1  /  TBili  0.4  /  DBili  x   /  AST  19  /  ALT  15  /  AlkPhos  82  12-28    PT/INR - ( 28 Dec 2023 07:39 )   PT: 11.20 sec;   INR: 0.98 ratio         PTT - ( 28 Dec 2023 07:39 )  PTT:29.4 sec  Urinalysis Basic - ( 28 Dec 2023 07:39 )    Color: x / Appearance: x / SG: x / pH: x  Gluc: 115 mg/dL / Ketone: x  / Bili: x / Urobili: x   Blood: x / Protein: x / Nitrite: x   Leuk Esterase: x / RBC: x / WBC x   Sq Epi: x / Non Sq Epi: x / Bacteria: x                RADIOLOGY:

## 2023-12-28 NOTE — BRIEF OPERATIVE NOTE - OPERATION/FINDINGS
Procedure : Diagnostic cerebral angiogram     Contrast: Visipaque 320   IA medications: Heparin 3000 IU, Verapamil 2.5mg, Nitroglycerin 200 mcg   Implants placed: n/a  Complications : n/a    Preliminary Report:  Selective diagnostic angiogram performed from right radial arteriotomy site with preliminary findings of left posterior developmental venous anomaly.     Official IR neuro procedure note to follow.

## 2023-12-28 NOTE — BRIEF OPERATIVE NOTE - COMMENTS
Please follow post NI orders for neuro checks, distal pulses, vitals, and arteriotomy site checks placed for total of 2 hour recovery period following procedure.   Keep HOB elevated, right wrist straight and immobile for x 2 hr    Keep IVF as ordered.   SBP < 140/80     Patient tolerated procedure well, hemodynamically stable, no change in neurological status compared to baseline.   NIHSS pre procedure _0_ post procedure _0_  Right wrist site CDI without evidence of bleeding hematoma oozing ecchymosis swelling at the time of closure. Site nontender to palpation. Temperature and color consistent bilaterally to palpation with intact distal pulses.   Right radial site distal to arteriotomy with residual ecchymosis s/p a line removal from ICU admission.     Please notify provider with any signs of bleeding or hematoma at arteriotomy site, change in mental status, vitals outside parameters, or absent distal pulses.   Management per medicine team. Signout given via spectra.   [id number] Please follow post NI orders for neuro checks, distal pulses, vitals, and arteriotomy site checks placed for total of 2 hour recovery period following procedure.   Keep HOB elevated, right wrist straight and immobile for x 2 hr    Keep IVF as ordered.   SBP < 140/80     Patient tolerated procedure well, hemodynamically stable, no change in neurological status compared to baseline.   NIHSS pre procedure _0_ post procedure _0_  Right wrist site CDI without evidence of bleeding hematoma oozing ecchymosis swelling at the time of closure. Site nontender to palpation. Temperature and color consistent bilaterally to palpation with intact distal pulses.   Right radial site distal to arteriotomy with residual ecchymosis s/p a line removal from ICU admission.     Please notify provider with any signs of bleeding or hematoma at arteriotomy site, change in mental status, vitals outside parameters, or absent distal pulses.   Management per medicine team. Signout given via spectra.   [phone]

## 2023-12-28 NOTE — CHART NOTE - NSCHARTNOTEFT_GEN_A_CORE
Patient is for diagnostic cerebral angiogram today with the neuroendovascular team.   NPO except medications.   NS @ 50 cc/hr   AM labs pending.   [id number]

## 2023-12-29 ENCOUNTER — TRANSCRIPTION ENCOUNTER (OUTPATIENT)
Age: 63
End: 2023-12-29

## 2023-12-29 VITALS
SYSTOLIC BLOOD PRESSURE: 129 MMHG | HEART RATE: 80 BPM | TEMPERATURE: 98 F | RESPIRATION RATE: 18 BRPM | DIASTOLIC BLOOD PRESSURE: 61 MMHG

## 2023-12-29 LAB
ALBUMIN SERPL ELPH-MCNC: 3.9 G/DL — SIGNIFICANT CHANGE UP (ref 3.5–5.2)
ALBUMIN SERPL ELPH-MCNC: 3.9 G/DL — SIGNIFICANT CHANGE UP (ref 3.5–5.2)
ALP SERPL-CCNC: 75 U/L — SIGNIFICANT CHANGE UP (ref 30–115)
ALP SERPL-CCNC: 75 U/L — SIGNIFICANT CHANGE UP (ref 30–115)
ALT FLD-CCNC: 15 U/L — SIGNIFICANT CHANGE UP (ref 0–41)
ALT FLD-CCNC: 15 U/L — SIGNIFICANT CHANGE UP (ref 0–41)
ANION GAP SERPL CALC-SCNC: 14 MMOL/L — SIGNIFICANT CHANGE UP (ref 7–14)
ANION GAP SERPL CALC-SCNC: 14 MMOL/L — SIGNIFICANT CHANGE UP (ref 7–14)
AST SERPL-CCNC: 16 U/L — SIGNIFICANT CHANGE UP (ref 0–41)
AST SERPL-CCNC: 16 U/L — SIGNIFICANT CHANGE UP (ref 0–41)
BASOPHILS # BLD AUTO: 0.05 K/UL — SIGNIFICANT CHANGE UP (ref 0–0.2)
BASOPHILS # BLD AUTO: 0.05 K/UL — SIGNIFICANT CHANGE UP (ref 0–0.2)
BASOPHILS NFR BLD AUTO: 0.4 % — SIGNIFICANT CHANGE UP (ref 0–1)
BASOPHILS NFR BLD AUTO: 0.4 % — SIGNIFICANT CHANGE UP (ref 0–1)
BILIRUB SERPL-MCNC: 0.3 MG/DL — SIGNIFICANT CHANGE UP (ref 0.2–1.2)
BILIRUB SERPL-MCNC: 0.3 MG/DL — SIGNIFICANT CHANGE UP (ref 0.2–1.2)
BUN SERPL-MCNC: 19 MG/DL — SIGNIFICANT CHANGE UP (ref 10–20)
BUN SERPL-MCNC: 19 MG/DL — SIGNIFICANT CHANGE UP (ref 10–20)
CALCIUM SERPL-MCNC: 9.4 MG/DL — SIGNIFICANT CHANGE UP (ref 8.4–10.4)
CALCIUM SERPL-MCNC: 9.4 MG/DL — SIGNIFICANT CHANGE UP (ref 8.4–10.4)
CHLORIDE SERPL-SCNC: 100 MMOL/L — SIGNIFICANT CHANGE UP (ref 98–110)
CHLORIDE SERPL-SCNC: 100 MMOL/L — SIGNIFICANT CHANGE UP (ref 98–110)
CO2 SERPL-SCNC: 21 MMOL/L — SIGNIFICANT CHANGE UP (ref 17–32)
CO2 SERPL-SCNC: 21 MMOL/L — SIGNIFICANT CHANGE UP (ref 17–32)
CREAT SERPL-MCNC: 0.9 MG/DL — SIGNIFICANT CHANGE UP (ref 0.7–1.5)
CREAT SERPL-MCNC: 0.9 MG/DL — SIGNIFICANT CHANGE UP (ref 0.7–1.5)
EGFR: 72 ML/MIN/1.73M2 — SIGNIFICANT CHANGE UP
EGFR: 72 ML/MIN/1.73M2 — SIGNIFICANT CHANGE UP
EOSINOPHIL # BLD AUTO: 0.08 K/UL — SIGNIFICANT CHANGE UP (ref 0–0.7)
EOSINOPHIL # BLD AUTO: 0.08 K/UL — SIGNIFICANT CHANGE UP (ref 0–0.7)
EOSINOPHIL NFR BLD AUTO: 0.6 % — SIGNIFICANT CHANGE UP (ref 0–8)
EOSINOPHIL NFR BLD AUTO: 0.6 % — SIGNIFICANT CHANGE UP (ref 0–8)
GLUCOSE SERPL-MCNC: 132 MG/DL — HIGH (ref 70–99)
GLUCOSE SERPL-MCNC: 132 MG/DL — HIGH (ref 70–99)
HCT VFR BLD CALC: 39.4 % — SIGNIFICANT CHANGE UP (ref 37–47)
HCT VFR BLD CALC: 39.4 % — SIGNIFICANT CHANGE UP (ref 37–47)
HGB BLD-MCNC: 13 G/DL — SIGNIFICANT CHANGE UP (ref 12–16)
HGB BLD-MCNC: 13 G/DL — SIGNIFICANT CHANGE UP (ref 12–16)
IMM GRANULOCYTES NFR BLD AUTO: 0.6 % — HIGH (ref 0.1–0.3)
IMM GRANULOCYTES NFR BLD AUTO: 0.6 % — HIGH (ref 0.1–0.3)
LYMPHOCYTES # BLD AUTO: 2.52 K/UL — SIGNIFICANT CHANGE UP (ref 1.2–3.4)
LYMPHOCYTES # BLD AUTO: 2.52 K/UL — SIGNIFICANT CHANGE UP (ref 1.2–3.4)
LYMPHOCYTES # BLD AUTO: 20.1 % — LOW (ref 20.5–51.1)
LYMPHOCYTES # BLD AUTO: 20.1 % — LOW (ref 20.5–51.1)
MAGNESIUM SERPL-MCNC: 2.2 MG/DL — SIGNIFICANT CHANGE UP (ref 1.8–2.4)
MAGNESIUM SERPL-MCNC: 2.2 MG/DL — SIGNIFICANT CHANGE UP (ref 1.8–2.4)
MCHC RBC-ENTMCNC: 26.5 PG — LOW (ref 27–31)
MCHC RBC-ENTMCNC: 26.5 PG — LOW (ref 27–31)
MCHC RBC-ENTMCNC: 33 G/DL — SIGNIFICANT CHANGE UP (ref 32–37)
MCHC RBC-ENTMCNC: 33 G/DL — SIGNIFICANT CHANGE UP (ref 32–37)
MCV RBC AUTO: 80.2 FL — LOW (ref 81–99)
MCV RBC AUTO: 80.2 FL — LOW (ref 81–99)
MONOCYTES # BLD AUTO: 0.88 K/UL — HIGH (ref 0.1–0.6)
MONOCYTES # BLD AUTO: 0.88 K/UL — HIGH (ref 0.1–0.6)
MONOCYTES NFR BLD AUTO: 7 % — SIGNIFICANT CHANGE UP (ref 1.7–9.3)
MONOCYTES NFR BLD AUTO: 7 % — SIGNIFICANT CHANGE UP (ref 1.7–9.3)
NEUTROPHILS # BLD AUTO: 8.9 K/UL — HIGH (ref 1.4–6.5)
NEUTROPHILS # BLD AUTO: 8.9 K/UL — HIGH (ref 1.4–6.5)
NEUTROPHILS NFR BLD AUTO: 71.3 % — SIGNIFICANT CHANGE UP (ref 42.2–75.2)
NEUTROPHILS NFR BLD AUTO: 71.3 % — SIGNIFICANT CHANGE UP (ref 42.2–75.2)
NRBC # BLD: 0 /100 WBCS — SIGNIFICANT CHANGE UP (ref 0–0)
NRBC # BLD: 0 /100 WBCS — SIGNIFICANT CHANGE UP (ref 0–0)
PLATELET # BLD AUTO: 499 K/UL — HIGH (ref 130–400)
PLATELET # BLD AUTO: 499 K/UL — HIGH (ref 130–400)
PMV BLD: 10.3 FL — SIGNIFICANT CHANGE UP (ref 7.4–10.4)
PMV BLD: 10.3 FL — SIGNIFICANT CHANGE UP (ref 7.4–10.4)
POTASSIUM SERPL-MCNC: 4 MMOL/L — SIGNIFICANT CHANGE UP (ref 3.5–5)
POTASSIUM SERPL-MCNC: 4 MMOL/L — SIGNIFICANT CHANGE UP (ref 3.5–5)
POTASSIUM SERPL-SCNC: 4 MMOL/L — SIGNIFICANT CHANGE UP (ref 3.5–5)
POTASSIUM SERPL-SCNC: 4 MMOL/L — SIGNIFICANT CHANGE UP (ref 3.5–5)
PROT SERPL-MCNC: 6.9 G/DL — SIGNIFICANT CHANGE UP (ref 6–8)
PROT SERPL-MCNC: 6.9 G/DL — SIGNIFICANT CHANGE UP (ref 6–8)
RBC # BLD: 4.91 M/UL — SIGNIFICANT CHANGE UP (ref 4.2–5.4)
RBC # BLD: 4.91 M/UL — SIGNIFICANT CHANGE UP (ref 4.2–5.4)
RBC # FLD: 14.3 % — SIGNIFICANT CHANGE UP (ref 11.5–14.5)
RBC # FLD: 14.3 % — SIGNIFICANT CHANGE UP (ref 11.5–14.5)
SODIUM SERPL-SCNC: 135 MMOL/L — SIGNIFICANT CHANGE UP (ref 135–146)
SODIUM SERPL-SCNC: 135 MMOL/L — SIGNIFICANT CHANGE UP (ref 135–146)
WBC # BLD: 12.51 K/UL — HIGH (ref 4.8–10.8)
WBC # BLD: 12.51 K/UL — HIGH (ref 4.8–10.8)
WBC # FLD AUTO: 12.51 K/UL — HIGH (ref 4.8–10.8)
WBC # FLD AUTO: 12.51 K/UL — HIGH (ref 4.8–10.8)

## 2023-12-29 PROCEDURE — 99239 HOSP IP/OBS DSCHRG MGMT >30: CPT

## 2023-12-29 RX ORDER — LISINOPRIL 2.5 MG/1
1 TABLET ORAL
Qty: 14 | Refills: 0
Start: 2023-12-29 | End: 2024-01-11

## 2023-12-29 RX ORDER — HYDROCHLOROTHIAZIDE 25 MG
1 TABLET ORAL
Qty: 14 | Refills: 0
Start: 2023-12-29 | End: 2024-01-11

## 2023-12-29 RX ORDER — AMLODIPINE BESYLATE 2.5 MG/1
1 TABLET ORAL
Qty: 14 | Refills: 0
Start: 2023-12-29 | End: 2024-01-11

## 2023-12-29 RX ORDER — METOPROLOL TARTRATE 50 MG
1 TABLET ORAL
Qty: 42 | Refills: 0
Start: 2023-12-29 | End: 2024-01-11

## 2023-12-29 RX ADMIN — Medication 1 MILLIGRAM(S): at 18:44

## 2023-12-29 RX ADMIN — Medication 1 MILLIGRAM(S): at 05:23

## 2023-12-29 RX ADMIN — Medication 25 MILLIGRAM(S): at 08:24

## 2023-12-29 RX ADMIN — ENOXAPARIN SODIUM 40 MILLIGRAM(S): 100 INJECTION SUBCUTANEOUS at 11:20

## 2023-12-29 RX ADMIN — Medication 650 MILLIGRAM(S): at 00:30

## 2023-12-29 RX ADMIN — PANTOPRAZOLE SODIUM 40 MILLIGRAM(S): 20 TABLET, DELAYED RELEASE ORAL at 05:23

## 2023-12-29 RX ADMIN — Medication 650 MILLIGRAM(S): at 06:00

## 2023-12-29 RX ADMIN — Medication 25 MILLIGRAM(S): at 15:15

## 2023-12-29 RX ADMIN — AMLODIPINE BESYLATE 10 MILLIGRAM(S): 2.5 TABLET ORAL at 05:22

## 2023-12-29 RX ADMIN — Medication 650 MILLIGRAM(S): at 18:45

## 2023-12-29 RX ADMIN — CHLORHEXIDINE GLUCONATE 1 APPLICATION(S): 213 SOLUTION TOPICAL at 05:27

## 2023-12-29 RX ADMIN — Medication 650 MILLIGRAM(S): at 11:19

## 2023-12-29 RX ADMIN — LISINOPRIL 20 MILLIGRAM(S): 2.5 TABLET ORAL at 05:25

## 2023-12-29 RX ADMIN — ESCITALOPRAM OXALATE 10 MILLIGRAM(S): 10 TABLET, FILM COATED ORAL at 11:19

## 2023-12-29 RX ADMIN — Medication 650 MILLIGRAM(S): at 12:12

## 2023-12-29 RX ADMIN — Medication 650 MILLIGRAM(S): at 05:23

## 2023-12-29 NOTE — PROGRESS NOTE ADULT - SUBJECTIVE AND OBJECTIVE BOX
Neuroendovascular Progress Note:     HPI:  63-year-old female with a history of anxiety/ MDD, hypothyroidism, fibromyalgia, and eczema, who presented with AMS/ lethargy with no acute findings on CTH. 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 The patient had been admitted with toxic metabolic encephalopathy vs. seizure workup and was noted on MR Brain report to have a large, left cerebellar developmental venous anomaly. The patient is now PPD 1 s/p diagnostic cerebral angiogram on which a deep venous anomaly of the posterior fossa (draining into the Vein of Thang/ IC Vein with no clear AV shunting) was observed.  No sign of an arteriovenous malformation or aneurysm was observed. On exam this morning the patient has no acute complaints.    Past Medical History:  Fibromyalgia  Eczema, unspecified type  Depression, unspecified depression type    24-Hour Events: PPD 1 s/p diagnostic cerebral angiogram    Medication:  acetaminophen     Tablet .. 650 milliGRAM(s) Oral every 6 hours  albuterol/ipratropium for Nebulization 3 milliLiter(s) Nebulizer every 6 hours  amitriptyline 25 milliGRAM(s) Oral at bedtime  amLODIPine   Tablet 10 milliGRAM(s) Oral daily  chlorhexidine 2% Cloths 1 Application(s) Topical <User Schedule>  enoxaparin Injectable 40 milliGRAM(s) SubCutaneous every 24 hours  escitalopram 10 milliGRAM(s) Oral daily  hydrochlorothiazide 25 milliGRAM(s) Oral daily  lisinopril 20 milliGRAM(s) Oral daily  LORazepam     Tablet 1 milliGRAM(s) Oral two times a day  metoprolol tartrate 25 milliGRAM(s) Oral every 8 hours  pantoprazole    Tablet 40 milliGRAM(s) Oral before breakfast  petrolatum white Ointment 1 Application(s) Topical every 12 hours  sodium chloride 0.9%. 1000 milliLiter(s) IV Continuous <Continuous>    No Known Allergies    Recent Vitals:  T(F): 98.3 (12-29-23 @ 07:13), Max: 98.8 (12-28-23 @ 23:44)  HR: 69 (12-29-23 @ 07:13) (68 - 92)  BP: 142/75 (12-29-23 @ 07:13) (141/65 - 182/82)  RR: 18 (12-29-23 @ 07:13) (16 - 18)  SpO2: 98% (12-28-23 @ 13:45) (96% - 98%)    Recent Labs:                        13.0   12.51 )-----------( 499      ( 29 Dec 2023 06:15 )             39.4     12-29    135  |  100  |  19  ----------------------------<  132<H>  4.0   |  21  |  0.9    Ca    9.4      29 Dec 2023 06:15  Mg     2.2     12-29    TPro  6.9  /  Alb  3.9  /  TBili  0.3  /  DBili  x   /  AST  16  /  ALT  15  /  AlkPhos  75  12-29    PT/INR - ( 28 Dec 2023 07:39 )   PT: 11.20 sec;   INR: 0.98 ratio       PTT - ( 28 Dec 2023 07:39 )  PTT:29.4 sec  Urinalysis Basic - ( 29 Dec 2023 06:15 )    Color: x / Appearance: x / SG: x / pH: x  Gluc: 132 mg/dL / Ketone: x  / Bili: x / Urobili: x   Blood: x / Protein: x / Nitrite: x   Leuk Esterase: x / RBC: x / WBC x   Sq Epi: x / Non Sq Epi: x / Bacteria: x    LIVER FUNCTIONS - ( 29 Dec 2023 06:15 )  Alb: 3.9 g/dL / Pro: 6.9 g/dL / ALK PHOS: 75 U/L / ALT: 15 U/L / AST: 16 U/L / GGT: x           Exam:  General: NAD  Neuro: AAOx3, moving all extremities AG without drift, sensation intact to light touch throughout, PERRLA, EOMI, visual fields full, no dysarthria or aphasia, following commands.  Extremities: RUE c/d/i with no sign of hematoma formation or infection/ continued bleeding; nontender; radial pulse intact; temperature and color consistent b/l.    Radiology:   Pertinent radiological imaging has been reviewed.    ACC: [id number] EXAM: IR PROCEDURE NEURO ORDERED BY: DOMINIQUE BALL    PROCEDURE DATE: 12/28/2023    IMPRESSION:    No evidence of an angiographically visible arteriovenous malformation or intracranial aneurysm    Deep venous anomaly of a posterior fossa as described above.    --- End of Report ---    ORQUIDEA GARCIA  This document has been electronically signed. Dec 28 2023 3:21PM    Assessment:   63-year-old female with a history of anxiety/ MDD, hypothyroidism, fibromyalgia, and eczema, who presented with AMS/ lethargy with no acute findings on CTH. 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 The patient had been admitted with toxic metabolic encephalopathy vs. seizure workup and was noted on MR Brain report to have a large, left cerebellar developmental venous anomaly. The patient is now PPD 1 s/p diagnostic cerebral angiogram on which a deep venous anomaly of the posterior fossa (draining into the Vein of Thang/ IC Vein with no clear AV shunting) was observed.  No sign of an arteriovenous malformation or aneurysm was observed. On exam this morning the patient has no acute complaints.    Suggestions:  - Continue to monitor the right upper extremity arteriotomy site for signs of hematoma/ infection/ ischemia  - F/u outpatient with Dr. Garcia within 1-2 weeks of discharge   - No acute neuroendovascular intervention is planned on this admission  [id number] Neuroendovascular      Neuroendovascular Progress Note:     HPI:  63-year-old female with a history of anxiety/ MDD, hypothyroidism, fibromyalgia, and eczema, who presented with AMS/ lethargy with no acute findings on CTH. 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 The patient had been admitted with toxic metabolic encephalopathy vs. seizure workup and was noted on MR Brain report to have a large, left cerebellar developmental venous anomaly. The patient is now PPD 1 s/p diagnostic cerebral angiogram on which a deep venous anomaly of the posterior fossa (draining into the Vein of Thang/ IC Vein with no clear AV shunting) was observed.  No sign of an arteriovenous malformation or aneurysm was observed. On exam this morning the patient has no acute complaints.    Past Medical History:  Fibromyalgia  Eczema, unspecified type  Depression, unspecified depression type    24-Hour Events: PPD 1 s/p diagnostic cerebral angiogram    Medication:  acetaminophen     Tablet .. 650 milliGRAM(s) Oral every 6 hours  albuterol/ipratropium for Nebulization 3 milliLiter(s) Nebulizer every 6 hours  amitriptyline 25 milliGRAM(s) Oral at bedtime  amLODIPine   Tablet 10 milliGRAM(s) Oral daily  chlorhexidine 2% Cloths 1 Application(s) Topical <User Schedule>  enoxaparin Injectable 40 milliGRAM(s) SubCutaneous every 24 hours  escitalopram 10 milliGRAM(s) Oral daily  hydrochlorothiazide 25 milliGRAM(s) Oral daily  lisinopril 20 milliGRAM(s) Oral daily  LORazepam     Tablet 1 milliGRAM(s) Oral two times a day  metoprolol tartrate 25 milliGRAM(s) Oral every 8 hours  pantoprazole    Tablet 40 milliGRAM(s) Oral before breakfast  petrolatum white Ointment 1 Application(s) Topical every 12 hours  sodium chloride 0.9%. 1000 milliLiter(s) IV Continuous <Continuous>    No Known Allergies    Recent Vitals:  T(F): 98.3 (12-29-23 @ 07:13), Max: 98.8 (12-28-23 @ 23:44)  HR: 69 (12-29-23 @ 07:13) (68 - 92)  BP: 142/75 (12-29-23 @ 07:13) (141/65 - 182/82)  RR: 18 (12-29-23 @ 07:13) (16 - 18)  SpO2: 98% (12-28-23 @ 13:45) (96% - 98%)    Recent Labs:                        13.0   12.51 )-----------( 499      ( 29 Dec 2023 06:15 )             39.4     12-29    135  |  100  |  19  ----------------------------<  132<H>  4.0   |  21  |  0.9    Ca    9.4      29 Dec 2023 06:15  Mg     2.2     12-29    TPro  6.9  /  Alb  3.9  /  TBili  0.3  /  DBili  x   /  AST  16  /  ALT  15  /  AlkPhos  75  12-29    PT/INR - ( 28 Dec 2023 07:39 )   PT: 11.20 sec;   INR: 0.98 ratio       PTT - ( 28 Dec 2023 07:39 )  PTT:29.4 sec  Urinalysis Basic - ( 29 Dec 2023 06:15 )    Color: x / Appearance: x / SG: x / pH: x  Gluc: 132 mg/dL / Ketone: x  / Bili: x / Urobili: x   Blood: x / Protein: x / Nitrite: x   Leuk Esterase: x / RBC: x / WBC x   Sq Epi: x / Non Sq Epi: x / Bacteria: x    LIVER FUNCTIONS - ( 29 Dec 2023 06:15 )  Alb: 3.9 g/dL / Pro: 6.9 g/dL / ALK PHOS: 75 U/L / ALT: 15 U/L / AST: 16 U/L / GGT: x           Exam:  General: NAD  Neuro: AAOx3, moving all extremities AG without drift, sensation intact to light touch throughout, PERRLA, EOMI, visual fields full, no dysarthria or aphasia, following commands.  Extremities: RUE c/d/i with no sign of hematoma formation or infection/ continued bleeding; nontender; radial pulse intact; temperature and color consistent b/l.    Radiology:   Pertinent radiological imaging has been reviewed.    ACC: [id number] EXAM: IR PROCEDURE NEURO ORDERED BY: DOMINIQUE BALL    PROCEDURE DATE: 12/28/2023    IMPRESSION:    No evidence of an angiographically visible arteriovenous malformation or intracranial aneurysm    Deep venous anomaly of a posterior fossa as described above.    --- End of Report ---    ORQUIDEA GARCIA  This document has been electronically signed. Dec 28 2023 3:21PM    Assessment:   63-year-old female with a history of anxiety/ MDD, hypothyroidism, fibromyalgia, and eczema, who presented with AMS/ lethargy with no acute findings on CTH. 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 The patient had been admitted with toxic metabolic encephalopathy vs. seizure workup and was noted on MR Brain report to have a large, left cerebellar developmental venous anomaly. The patient is now PPD 1 s/p diagnostic cerebral angiogram on which a deep venous anomaly of the posterior fossa (draining into the Vein of Thang/ IC Vein with no clear AV shunting) was observed.  No sign of an arteriovenous malformation or aneurysm was observed. On exam this morning the patient has no acute complaints.    Suggestions:  - Continue to monitor the right upper extremity arteriotomy site for signs of hematoma/ infection/ ischemia  - F/u outpatient with Dr. Garcia within 1-2 weeks of discharge   - No acute neuroendovascular intervention is planned on this admission  [id number] Neuroendovascular

## 2023-12-29 NOTE — PROGRESS NOTE ADULT - PROVIDER SPECIALTY LIST ADULT
Hospitalist
MICU
Neurology
Critical Care
Internal Medicine
Neurology
Critical Care
Critical Care
Internal Medicine
Internal Medicine
Hospitalist
Hospitalist
MICU
MICU
Hospitalist
Hospitalist
Internal Medicine
Palliative Care

## 2023-12-29 NOTE — DISCHARGE NOTE PROVIDER - NSDCMRMEDTOKEN_GEN_ALL_CORE_FT
amitriptyline 25 mg oral tablet: 1 tab(s) orally once a day  escitalopram 10 mg oral tablet: 1 tab(s) orally once a day  LORazepam 1 mg oral tablet: 1 tab(s) orally 2 times a day  Protonix 40 mg oral delayed release tablet: 1 tab(s) orally once a day  tiZANidine 4 mg oral capsule: 2 cap(s) orally once a day   amitriptyline 25 mg oral tablet: 1 tab(s) orally once a day  amLODIPine 10 mg oral tablet: 1 tab(s) orally once a day  benzonatate 100 mg oral capsule: 1 cap(s) orally 3 times a day as needed for Cough  escitalopram 10 mg oral tablet: 1 tab(s) orally once a day  hydroCHLOROthiazide 25 mg oral tablet: 1 tab(s) orally once a day  lisinopril 20 mg oral tablet: 1 tab(s) orally once a day  LORazepam 1 mg oral tablet: 1 tab(s) orally 2 times a day  metoprolol tartrate 25 mg oral tablet: 1 tab(s) orally every 8 hours  naproxen 375 mg oral tablet: 1 tab(s) orally once a day as needed for Mild Pain (1 - 3)  Protonix 40 mg oral delayed release tablet: 1 tab(s) orally once a day  tiZANidine 4 mg oral capsule: 2 cap(s) orally once a day

## 2023-12-29 NOTE — DISCHARGE NOTE PROVIDER - ATTENDING DISCHARGE PHYSICAL EXAMINATION:
Vital Signs Last 24 Hrs  T(F): 98.3 (29 Dec 2023 07:13), Max: 98.8 (28 Dec 2023 23:44)  HR: 69 (29 Dec 2023 07:13) (68 - 89)  BP: 142/75 (29 Dec 2023 07:13) (141/65 - 182/82)  RR: 18 (29 Dec 2023 07:13) (16 - 18)  SpO2: 98% (28 Dec 2023 13:45) (98% - 98%)    PHYSICAL EXAM:  GENERAL: NAD, well-groomed, well-developed  HEAD:  Atraumatic, Normocephalic  EYES: EOMI, conjunctiva and sclera clear  ENMT: Moist mucous membranes, poor dentition, no thrush  NECK: Supple, No JVD  CHEST/LUNG: Clear to auscultation bilaterally, good air entry, non-labored breathing  HEART: RRR; S1/S2, No murmur  ABDOMEN: Soft, Nontender, Nondistended; Bowel sounds present  VASCULAR: Normal pulses, Normal capillary refill  EXTREMITIES: No calf tenderness, No cyanosis, No edema  LYMPH: Normal; No lymphadenopathy noted  SKIN: Warm, Intact  PSYCH: Normal mood, Normal affect  NERVOUS SYSTEM:  A/O x3, Good concentration; CN 2-12 intact, No focal deficits

## 2023-12-29 NOTE — DISCHARGE NOTE PROVIDER - CARE PROVIDER_API CALL
Estefania Ding  Internal Medicine  [address] [place] [address]  Phone: [phone]  Fax: [phone]  Follow Up Time: 1 week   Estefania Ding  Internal Medicine  [address] [place] [address]  Phone: [phone]  Fax: [phone]  Follow Up Time: 1 week   Estefania Ding  Internal Medicine  [address] [place] [address]  Phone: [phone]  Fax: [phone]  Follow Up Time: 1 week    Lior Garcia  Neurosurgery  [address] [place] [address], [address]  [place] [address]  Phone: [phone]  Fax: [phone]  Follow Up Time: 2 weeks   Estefania Ding  Internal Medicine  [address] [place] [address]  Phone: [phone]  Fax: [phone]  Follow Up Time: 1 week    Lior Garcia  Neurosurgery  [address] [place] [address], [address]  [place] [address]  Phone: [phone]  Fax: [phone]  Follow Up Time: 2 weeks

## 2023-12-29 NOTE — DISCHARGE NOTE NURSING/CASE MANAGEMENT/SOCIAL WORK - PATIENT PORTAL LINK FT
You can access the FollowMyHealth Patient Portal offered by [facility] by registering at the following website: [email or web address]. By joining [employer]’s FollowMyHealth portal, you will also be able to view your health information using other applications (apps) compatible with our system. You can access the FollowMyHealth Patient Portal offered by [facility] by registering at the following website: [email or web address]. By joining [employer]’s FollowMyHealth portal, you will also be able to view your health information using other applications (apps) compatible with our system.

## 2023-12-29 NOTE — DISCHARGE NOTE PROVIDER - PROVIDER TOKENS
PROVIDER:[TOKEN:[[id number]],FOLLOWUP:[1 week]] PROVIDER:[TOKEN:[[id number]],FOLLOWUP:[1 week]] PROVIDER:[TOKEN:[[id number]],FOLLOWUP:[1 week]],PROVIDER:[TOKEN:[[id number]],FOLLOWUP:[2 weeks]] PROVIDER:[TOKEN:[[id number]],FOLLOWUP:[1 week]],PROVIDER:[TOKEN:[[id number]],FOLLOWUP:[2 weeks]]

## 2023-12-29 NOTE — DISCHARGE NOTE PROVIDER - HOSPITAL COURSE
64 yo F with PMHx of anxiety/MDD, Hypothyroidism and fibromyalgia BIBEMS to the hospital for AMS. Family called EMS because they found patient obtunded and altered at home today, they were concerned that patient may have overdosed on her sleeping pills. Family do describe that for the past 2 days patient has been having a cough, yesterday evening she started becoming progressively lethargic and thus he called EMS. Son denies any fever chills, seizure like activity fall or trauma while at home. Further history unable to be assessed at this moment.     Upon arrival a code stroke was called for AMS with CTA head neg for large vessel occlusion. In the ED patient was hypertensive and hypoxemic. The decision to intubate her was done for protection of airways. Patient admitted to ICU for AHRF 2/2 bilateral pneumonia     63 year old female history anxiety/MDD, hypothyroidism, fibromyalgia, presented for AMS and lethargy. Stroke code was activated, NIHSS 21. CTH negative, CTA reporting developmental venous anomaly in the left cerebellum. Patient was placed on vEEG, intubated for airway protection, now on sedation in ICU. Admitted for sepsis with acute respiratory failure/ influenza A pneumonia s/p intubation 12/19 and extubation 12/20. CXR 12/21 showed improved lung aeration with residual disease. Patient downgraded from ICU to 4B    #Acute Hypoxemic Respiratory Failure s/p Intubation 12/19 on and Extubated 12/20 - resolved; on room air now  #Influenza A Pneumonia - s/p tamiflu   #Possible superimposed bacterial infection - completed course of abx  - monitor off oxygen  - cough suppressant prn   - nebs prn  -on room air     #Left Cerebellar venous Anomaly   - f/u MRI results from 12/25  - Neurosurgery did cerebellar angiogram 12/28 - no AV anomaly, no further recs     #HTN Emergency on admission  #HTN urgency currently    -continue Norvasc 10mg and increased lisinopril to 20mg daily  - 12/27- patient noted to be anxious; home med of Lorazepam 1 mg BID restarted. May have been contributing to elevated pressures  - 12/27 increase HCTZ 25mg   -more controlled now     #History of Fibromyalgia   #depression  #anxiety   #hypothyroidism   - continue home meds --> 12/27 restarted home Lorazepam 1 mg BID  - pain control  - pain management cs appreciated  - PT following     Pt stable for DC        62 yo F with PMHx of anxiety/MDD, Hypothyroidism and fibromyalgia BIBEMS to the hospital for AMS. Family called EMS because they found patient obtunded and altered at home today, they were concerned that patient may have overdosed on her sleeping pills. Family do describe that for the past 2 days patient has been having a cough, yesterday evening she started becoming progressively lethargic and thus he called EMS. Son denies any fever chills, seizure like activity fall or trauma while at home. Further history unable to be assessed at this moment.     Upon arrival a code stroke was called for AMS with CTA head neg for large vessel occlusion. In the ED patient was hypertensive and hypoxemic. The decision to intubate her was done for protection of airways. Patient admitted to ICU for AHRF 2/2 bilateral pneumonia     63 year old female history anxiety/MDD, hypothyroidism, fibromyalgia, presented for AMS and lethargy. Stroke code was activated, NIHSS 21. CTH negative, CTA reporting developmental venous anomaly in the left cerebellum. Patient was placed on vEEG, intubated for airway protection, now on sedation in ICU. Admitted for sepsis with acute respiratory failure/ influenza A pneumonia s/p intubation 12/19 and extubation 12/20. CXR 12/21 showed improved lung aeration with residual disease. Patient downgraded from ICU to 4B    #Acute Hypoxemic Respiratory Failure s/p Intubation 12/19 on and Extubated 12/20 - resolved; on room air now  #Influenza A Pneumonia - s/p tamiflu   #Possible superimposed bacterial infection - completed course of abx  - monitor off oxygen  - cough suppressant prn   - nebs prn  -on room air     #Left Cerebellar venous Anomaly   - f/u MRI results from 12/25  - Neurosurgery did cerebellar angiogram 12/28 - no AV anomaly, deep venous anomaly of the posterior fossa (draining into the Vein of Thang/IC vein wit no clear AV shunting); pt is recommended to follow outpatient with Dr. Garcia    #HTN Emergency on admission  #HTN urgency currently    -continue Norvasc 10mg and increased lisinopril to 20mg daily  - 12/27- patient noted to be anxious; home med of Lorazepam 1 mg BID restarted. May have been contributing to elevated pressures  - 12/27 increase HCTZ 25mg   -more controlled now     #History of Fibromyalgia   #depression  #anxiety   #hypothyroidism   - continue home meds --> 12/27 restarted home Lorazepam 1 mg BID  - pain control  - pain management cs appreciated  - PT following     Pt stable for DC

## 2023-12-29 NOTE — DISCHARGE NOTE NURSING/CASE MANAGEMENT/SOCIAL WORK - NSDCPEFALRISK_GEN_ALL_CORE
For information on Fall & Injury Prevention, visit: [email or web address] [place]  [email or web address] OR  https://www.cdc.gov/steadi/patient.html For information on Fall & Injury Prevention, visit: [email or web address] [place]  [email or web address] OR  https://www.cdc.gov/steadi/patient.html

## 2023-12-29 NOTE — DISCHARGE NOTE PROVIDER - NSDCCPCAREPLAN_GEN_ALL_CORE_FT
PRINCIPAL DISCHARGE DIAGNOSIS  Diagnosis: Sepsis with acute hypoxic respiratory failure  Assessment and Plan of Treatment: You came to the ED with AMS and lethargy. You were intubated in the ICU  for acute respiratory failure/ influenza A pneumonia. You were extubated the next day and are now doing well on room air.   CTangio showed Left Cerebellar venous Anomaly which was confirmed by MRI.  Neurosurgery did cerebellar angiogram, cerebellar venous anomoly but no AV malformaiton or shunt and no further recommendations.         SECONDARY DISCHARGE DIAGNOSES  Diagnosis: Hypertension  Assessment and Plan of Treatment: When you came to the hospital your blood pressure was very high. During your hospital stay, we increased your blood pressure medications to Lisinopril 20 and HCTZ 25 daily, and continue Norvasc 10mg . Your blood pressure is more controlled now. Please take all medications as prescribed and follow up with your primary doctor.     PRINCIPAL DISCHARGE DIAGNOSIS  Diagnosis: Sepsis with acute hypoxic respiratory failure  Assessment and Plan of Treatment: You came to the ED with AMS and lethargy. You were intubated in the ICU  for acute respiratory failure/ influenza A pneumonia. You were extubated the next day and are now doing well on room air.   CTangio showed Left Cerebellar venous Anomaly which was confirmed by MRI.  Neurosurgery did cerebellar angiogram, cerebellar venous anomoly but no AV malformaiton or shunt and no further recommendations. Follow up [facility] Dr. Garcia within 1-2 weeks of discharge         SECONDARY DISCHARGE DIAGNOSES  Diagnosis: Hypertension  Assessment and Plan of Treatment: When you came to the hospital your blood pressure was very high. During your hospital stay, we increased your blood pressure medications to Lisinopril 20 and HCTZ 25 daily, and continue Norvasc 10mg . Your blood pressure is more controlled now. Please take all medications as prescribed and follow up with your primary doctor.     PRINCIPAL DISCHARGE DIAGNOSIS  Diagnosis: Sepsis with acute hypoxic respiratory failure  Assessment and Plan of Treatment: You came to the ED with AMS and lethargy. You were intubated in the ICU  for acute respiratory failure/ influenza A pneumonia. You were extubated the next day and are now doing well on room air.   CTangio showed Left Cerebellar venous Anomaly which was confirmed by MRI.  Neurosurgery did cerebellar angiogram, cerebellar venous anomoly but no AV malformaiton or shunt and no further recommendations. Follow up [facility] Dr. aGrcia within 1-2 weeks of discharge         SECONDARY DISCHARGE DIAGNOSES  Diagnosis: Hypertension  Assessment and Plan of Treatment: When you came to the hospital your blood pressure was very high. During your hospital stay, we increased your blood pressure medications to Lisinopril 20 and HCTZ 25 daily, and continue Norvasc 10mg . Your blood pressure is more controlled now. Please take all medications as prescribed and follow up with your primary doctor.

## 2023-12-29 NOTE — DISCHARGE NOTE PROVIDER - CARE PROVIDERS DIRECT ADDRESSES
,[email or web address],[email or web address] ,[email or web address],[email or web address]

## 2024-01-09 DIAGNOSIS — Z96.641 PRESENCE OF RIGHT ARTIFICIAL HIP JOINT: ICD-10-CM

## 2024-01-09 DIAGNOSIS — J15.9 UNSPECIFIED BACTERIAL PNEUMONIA: ICD-10-CM

## 2024-01-09 DIAGNOSIS — Q28.3 OTHER MALFORMATIONS OF CEREBRAL VESSELS: ICD-10-CM

## 2024-01-09 DIAGNOSIS — F32.A DEPRESSION, UNSPECIFIED: ICD-10-CM

## 2024-01-09 DIAGNOSIS — I10 ESSENTIAL (PRIMARY) HYPERTENSION: ICD-10-CM

## 2024-01-09 DIAGNOSIS — J96.01 ACUTE RESPIRATORY FAILURE WITH HYPOXIA: ICD-10-CM

## 2024-01-09 DIAGNOSIS — G89.29 OTHER CHRONIC PAIN: ICD-10-CM

## 2024-01-09 DIAGNOSIS — J10.00 INFLUENZA DUE TO OTHER IDENTIFIED INFLUENZA VIRUS WITH UNSPECIFIED TYPE OF PNEUMONIA: ICD-10-CM

## 2024-01-09 DIAGNOSIS — A41.9 SEPSIS, UNSPECIFIED ORGANISM: ICD-10-CM

## 2024-01-09 DIAGNOSIS — M79.7 FIBROMYALGIA: ICD-10-CM

## 2024-01-09 DIAGNOSIS — G92.8 OTHER TOXIC ENCEPHALOPATHY: ICD-10-CM

## 2024-01-09 DIAGNOSIS — F41.9 ANXIETY DISORDER, UNSPECIFIED: ICD-10-CM

## 2024-01-09 DIAGNOSIS — E03.9 HYPOTHYROIDISM, UNSPECIFIED: ICD-10-CM

## 2024-01-24 ENCOUNTER — APPOINTMENT (OUTPATIENT)
Dept: NEUROSURGERY | Facility: CLINIC | Age: 64
End: 2024-01-24
Payer: COMMERCIAL

## 2024-01-24 VITALS — HEIGHT: 64 IN | WEIGHT: 124 LBS | BODY MASS INDEX: 21.17 KG/M2

## 2024-01-24 DIAGNOSIS — Z78.9 OTHER SPECIFIED HEALTH STATUS: ICD-10-CM

## 2024-01-24 DIAGNOSIS — I87.2 VENOUS INSUFFICIENCY (CHRONIC) (PERIPHERAL): ICD-10-CM

## 2024-01-24 PROCEDURE — 99212 OFFICE O/P EST SF 10 MIN: CPT

## 2024-02-05 ENCOUNTER — APPOINTMENT (OUTPATIENT)
Dept: GASTROENTEROLOGY | Facility: CLINIC | Age: 64
End: 2024-02-05
Payer: COMMERCIAL

## 2024-02-05 VITALS — HEIGHT: 64 IN | BODY MASS INDEX: 20.49 KG/M2 | WEIGHT: 120 LBS

## 2024-02-05 DIAGNOSIS — K64.9 UNSPECIFIED HEMORRHOIDS: ICD-10-CM

## 2024-02-05 DIAGNOSIS — D50.9 IRON DEFICIENCY ANEMIA, UNSPECIFIED: ICD-10-CM

## 2024-02-05 PROCEDURE — 99204 OFFICE O/P NEW MOD 45 MIN: CPT

## 2024-02-05 RX ORDER — HYDROCORTISONE 25 MG/G
2.5 CREAM TOPICAL TWICE DAILY
Qty: 1 | Refills: 1 | Status: ACTIVE | COMMUNITY
Start: 2024-02-05 | End: 1900-01-01

## 2024-02-05 RX ORDER — FENTANYL 100 UG/H
100 PATCH, EXTENDED RELEASE TRANSDERMAL
Refills: 0 | Status: DISCONTINUED | COMMUNITY
End: 2024-02-05

## 2024-02-06 ENCOUNTER — APPOINTMENT (OUTPATIENT)
Dept: INTERNAL MEDICINE | Facility: CLINIC | Age: 64
End: 2024-02-06

## 2024-02-09 NOTE — ASSESSMENT
[FreeTextEntry1] : 63 yr old female with HTN, Hypothyroidism, Fibromyalgia, Perez's Esophagus, was hospitalized last month with AMS at home. Stroke code was followed in the ED and CTA was negative for an acute stroke. She was found to have Influenza A with bilateral pneumonia and was intubated. 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 She saw Neurologist Dr. Garcia after discharge and he said the condition is benign without further F/U needed. CBC done on 1/24/24 showed a drop in Hgb of 9.1, it was 13 on 12/29/24. Iron studies showed low Iron saturation of 14. She is currently on Iron po OD by her PCP x about a week. She vomited x 1 about a week ago without other associated symptoms which resolved quickly. She reported a H/O intermittent BRBPR due to hemorrhoids. She last noted rectal bleeding about 2 weeks ago associated with constipation. She still reports straining frequently.  She denied melena, abdominal pain, heartburn, weight loss. She has been taking Pantoprazole bid. Last EGD done 7/19 showed no H Pylori, IM, or dysplasia. Colonoscopy done 7/19 showed no colon polyps normal colonic mucosa, internal and external hemorrhoids.    Perez's Esophagus - Will schedule an EGD for surveillance; risks and benefits discussed - Continue PPI but decrease to OD 1/2 hr before breakfast  Rectal  bleeding, colon polyps -Update labs -Schedule colonoscopy  Follow up after testing

## 2024-02-09 NOTE — REVIEW OF SYSTEMS
[As Noted in HPI] : as noted in HPI [Vomiting] : vomiting [Constipation] : constipation [Bleeding] : bleeding [Negative] : Respiratory [Abdominal Pain] : no abdominal pain [Diarrhea] : no diarrhea [Heartburn] : no heartburn [Melena (black stool)] : no melena [Fecal Incontinence (soiling)] : no fecal incontinence [Bloating (gassiness)] : no bloating

## 2024-02-09 NOTE — HISTORY OF PRESENT ILLNESS
[FreeTextEntry1] : 63 yr old female with HTN, Hypothyroidism, Fibromyalgia, Perez's Esophagus, was hospitalized last month with AMS at home. Stroke code was followed in the ED and CTA was negative for an acute stroke. She was found to have Influenza A with bilateral pneumonia and was intubated. 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 She saw Neurologist Dr. Garcia after discharge and he said the condition is benign without further F/U needed. CBC done on 1/24/24 showed a drop in Hgb of 9.1, it was 13 on 12/29/24. Iron studies showed low Iron saturation of 14. She is currently on Iron po OD by her PCP x about a week. She vomited x 1 about a week ago without other associated symptoms which resolved quickly. She reported a H/O intermittent BRBPR due to hemorrhoids. She last noted rectal bleeding about 2 weeks ago associated with constipation. She still reports straining frequently.  She denied melena, abdominal pain, heartburn, weight loss. She has been taking Pantoprazole bid. Last EGD done 7/19 showed no H Pylori, IM, or dysplasia. Colonoscopy done 7/19 showed no colon polyps normal colonic mucosa, internal and external hemorrhoids.

## 2024-02-09 NOTE — END OF VISIT
[] : Fellow [FreeTextEntry3] :  Pt seen/examined, agree with above findings and plan as outlined by Diane D'Amico, PA. Pt presents for follow up of barretts esophagus and colon polyps. recent hospitalization noted with anemia on labs. Recommend update labs and schedule EGD/colonoscopy. Risks/benefits discussed.

## 2024-02-09 NOTE — PHYSICAL EXAM
[Alert] : alert [Normal Voice/Communication] : normal voice/communication [No Acute Distress] : no acute distress [Well Developed] : well developed [Sclera] : the sclera and conjunctiva were normal [Hearing Threshold Finger Rub Not [place]] : hearing was normal [Normal Lips/Gums] : the lips and gums were normal [Oropharynx] : the oropharynx was normal [Normal Appearance] : the appearance of the neck was normal [No Neck Mass] : no neck mass was observed [No Respiratory Distress] : no respiratory distress [No Acc Muscle Use] : no accessory muscle use [Respiration, Rhythm And Depth] : normal respiratory rhythm and effort [Auscultation Breath Sounds / Voice Sounds] : lungs were clear to auscultation bilaterally [Heart Rate And Rhythm] : heart rate was normal and rhythm regular [Normal S1, S2] : normal S1 and S2 [Murmurs] : no murmurs [None] : no edema [Bowel Sounds] : normal bowel sounds [Abdomen Tenderness] : non-tender [No Masses] : no abdominal mass palpated [Abdomen Soft] : soft [] : no hepatosplenomegaly [Abnormal Walk] : normal gait [Normal Color / Pigmentation] : normal skin color and pigmentation [Oriented To Time, Place, And Person] : oriented to person, place, and time [de-identified] : Thin

## 2024-02-15 NOTE — END OF VISIT
[FreeTextEntry3] :  I, Dr Garcia personally performed the evaluation and management (E/M) services for this established patient who presents today with (a) new problem(s)/exacerbation of (an) existing condition(s).  That E/M includes conducting the examination, assessing all new/exacerbated conditions, and establishing a new plan of care.  Today, my ANGIE was here to observe my evaluation and management services for this new problem/exacerbated condition to be followed going forward.

## 2024-02-15 NOTE — HISTORY OF PRESENT ILLNESS
[FreeTextEntry1] : Ms. BERTRAND is a pleasant 63-year-old female presenting to the neurosurgery office today as a hospital follow-up to review a diagnostic cerebral angiogram performed on 12/28/2023 and to delineate a further plan.   PMHx: anxiety, hypothyroidism, fibromyalgia   As a review, patient presented to the ED on 12/19/2023 for an acute change in mental status. The hospital records state that the family called EMS because the patient was found obtunded and altered. A stroke code was called and CTA head was negative for a large vessel occlusion. She was intubated at one point and sent to ICU due to hypertension and hypoxemia. A Diagnostic Cerebral Angiogram was performed on 12/28/2023 but there was no identified AV anomaly. A deep venous anomaly of the posterior fossa was noted.  Patient denies today any headaches, dizziness, one-sided weakness or visual disturbances. She is not on any dual antiplatelet therapy. The right wrist puncture site was assessed and was negative for any drainage or dehiscence, no hematoma or pseudoaneurysm noted, was clean, dry, and intact.   Diagnostic Cerebral Angiogram was reviewed in detail with the patient and her  today. Ms. BERTRAND was educated that she may return on an as needed basis going forward and there is no warrant for any endovascular intervention or further diagnostic imaging at this time. All questions were answered today.

## 2024-04-30 ENCOUNTER — APPOINTMENT (OUTPATIENT)
Dept: OBGYN | Facility: CLINIC | Age: 64
End: 2024-04-30
Payer: COMMERCIAL

## 2024-04-30 VITALS — HEIGHT: 64 IN | BODY MASS INDEX: 20.14 KG/M2 | WEIGHT: 118 LBS

## 2024-04-30 DIAGNOSIS — Z01.419 ENCOUNTER FOR GYNECOLOGICAL EXAMINATION (GENERAL) (ROUTINE) W/OUT ABNORMAL FINDINGS: ICD-10-CM

## 2024-04-30 PROCEDURE — 99386 PREV VISIT NEW AGE 40-64: CPT

## 2024-04-30 NOTE — HISTORY OF PRESENT ILLNESS
[FreeTextEntry1] : 64 yo P1 w/ LMP 40yo here for annual exam. No complaints. Last seen by GYN 15 years ago. No postmenopausal bleeding or hot flashes. Sexually active with , no issues.  had rectal cancer so not active recently. No abdominal pain or urinary complaints.  Retired  Last pap  normal per patient last mammogram  normal per patient  Last colonoscopy 2019, f/up 10yrs never had DEXA follows regularly with PMD and other subspecialists   OB hx: P1,  x1 GYN hx denies cysts, fibroids, abnormal paps, STIs PMH anxiety/depression, hypothyroidism, HTN, fibromyalgia meds morphine 60mg BID, amlodipine, lisinopril, zanaflex, elavil, lexapro, synthroid 50mcg, protonix, ativan NKDA PSH discectomy, hip replacement social denies fam hx denies

## 2024-04-30 NOTE — DISCUSSION/SUMMARY
[FreeTextEntry1] : 62 yo P1 postmenopausal, annual exam.  - f/up pap, HPV - schedule DEXA - referral for mammogram - return to office 1yr annual exam or PRN

## 2024-05-02 LAB — HPV HIGH+LOW RISK DNA PNL CVX: NOT DETECTED

## 2024-05-03 LAB — CYTOLOGY CVX/VAG DOC THIN PREP: NORMAL

## 2024-05-07 ENCOUNTER — LABORATORY RESULT (OUTPATIENT)
Age: 64
End: 2024-05-07

## 2024-05-22 ENCOUNTER — APPOINTMENT (OUTPATIENT)
Dept: OBGYN | Facility: CLINIC | Age: 64
End: 2024-05-22
Payer: COMMERCIAL

## 2024-05-22 PROCEDURE — 77080 DXA BONE DENSITY AXIAL: CPT

## 2024-05-24 ENCOUNTER — NON-APPOINTMENT (OUTPATIENT)
Age: 64
End: 2024-05-24

## 2024-06-04 ENCOUNTER — NON-APPOINTMENT (OUTPATIENT)
Age: 64
End: 2024-06-04

## 2024-06-05 ENCOUNTER — NON-APPOINTMENT (OUTPATIENT)
Age: 64
End: 2024-06-05

## 2024-06-05 ENCOUNTER — EMERGENCY (EMERGENCY)
Facility: HOSPITAL | Age: 64
LOS: 0 days | Discharge: HOME | End: 2024-06-05
Attending: EMERGENCY MEDICINE
Payer: COMMERCIAL

## 2024-06-05 VITALS
WEIGHT: 119.93 LBS | HEART RATE: 85 BPM | SYSTOLIC BLOOD PRESSURE: 126 MMHG | DIASTOLIC BLOOD PRESSURE: 62 MMHG | OXYGEN SATURATION: 93 % | HEIGHT: 64 IN | RESPIRATION RATE: 16 BRPM | TEMPERATURE: 98 F

## 2024-06-05 DIAGNOSIS — K83.8 OTHER SPECIFIED DISEASES OF BILIARY TRACT: ICD-10-CM

## 2024-06-05 DIAGNOSIS — K22.70 BARRETT'S ESOPHAGUS WITHOUT DYSPLASIA: ICD-10-CM

## 2024-06-05 DIAGNOSIS — Z98.890 OTHER SPECIFIED POSTPROCEDURAL STATES: Chronic | ICD-10-CM

## 2024-06-05 DIAGNOSIS — I10 ESSENTIAL (PRIMARY) HYPERTENSION: ICD-10-CM

## 2024-06-05 DIAGNOSIS — E03.9 HYPOTHYROIDISM, UNSPECIFIED: ICD-10-CM

## 2024-06-05 DIAGNOSIS — R79.89 OTHER SPECIFIED ABNORMAL FINDINGS OF BLOOD CHEMISTRY: ICD-10-CM

## 2024-06-05 DIAGNOSIS — M79.7 FIBROMYALGIA: ICD-10-CM

## 2024-06-05 LAB
ALBUMIN SERPL ELPH-MCNC: 4.3 G/DL — SIGNIFICANT CHANGE UP (ref 3.5–5.2)
ALP SERPL-CCNC: 267 U/L — HIGH (ref 30–115)
ALT FLD-CCNC: 456 U/L — HIGH (ref 0–41)
ANION GAP SERPL CALC-SCNC: 10 MMOL/L — SIGNIFICANT CHANGE UP (ref 7–14)
AST SERPL-CCNC: 255 U/L — HIGH (ref 0–41)
BASOPHILS # BLD AUTO: 0.05 K/UL — SIGNIFICANT CHANGE UP (ref 0–0.2)
BASOPHILS NFR BLD AUTO: 0.9 % — SIGNIFICANT CHANGE UP (ref 0–1)
BILIRUB SERPL-MCNC: 0.3 MG/DL — SIGNIFICANT CHANGE UP (ref 0.2–1.2)
BUN SERPL-MCNC: 29 MG/DL — HIGH (ref 10–20)
CALCIUM SERPL-MCNC: 9.3 MG/DL — SIGNIFICANT CHANGE UP (ref 8.4–10.5)
CHLORIDE SERPL-SCNC: 101 MMOL/L — SIGNIFICANT CHANGE UP (ref 98–110)
CO2 SERPL-SCNC: 25 MMOL/L — SIGNIFICANT CHANGE UP (ref 17–32)
CREAT SERPL-MCNC: 0.9 MG/DL — SIGNIFICANT CHANGE UP (ref 0.7–1.5)
EGFR: 72 ML/MIN/1.73M2 — SIGNIFICANT CHANGE UP
EOSINOPHIL # BLD AUTO: 0.16 K/UL — SIGNIFICANT CHANGE UP (ref 0–0.7)
EOSINOPHIL NFR BLD AUTO: 2.7 % — SIGNIFICANT CHANGE UP (ref 0–8)
GLUCOSE SERPL-MCNC: 95 MG/DL — SIGNIFICANT CHANGE UP (ref 70–99)
HCT VFR BLD CALC: 35.9 % — LOW (ref 37–47)
HGB BLD-MCNC: 12.2 G/DL — SIGNIFICANT CHANGE UP (ref 12–16)
IMM GRANULOCYTES NFR BLD AUTO: 0.3 % — SIGNIFICANT CHANGE UP (ref 0.1–0.3)
LYMPHOCYTES # BLD AUTO: 1.96 K/UL — SIGNIFICANT CHANGE UP (ref 1.2–3.4)
LYMPHOCYTES # BLD AUTO: 33.6 % — SIGNIFICANT CHANGE UP (ref 20.5–51.1)
MCHC RBC-ENTMCNC: 28.6 PG — SIGNIFICANT CHANGE UP (ref 27–31)
MCHC RBC-ENTMCNC: 34 G/DL — SIGNIFICANT CHANGE UP (ref 32–37)
MCV RBC AUTO: 84.1 FL — SIGNIFICANT CHANGE UP (ref 81–99)
MONOCYTES # BLD AUTO: 0.5 K/UL — SIGNIFICANT CHANGE UP (ref 0.1–0.6)
MONOCYTES NFR BLD AUTO: 8.6 % — SIGNIFICANT CHANGE UP (ref 1.7–9.3)
NEUTROPHILS # BLD AUTO: 3.14 K/UL — SIGNIFICANT CHANGE UP (ref 1.4–6.5)
NEUTROPHILS NFR BLD AUTO: 53.9 % — SIGNIFICANT CHANGE UP (ref 42.2–75.2)
NRBC # BLD: 0 /100 WBCS — SIGNIFICANT CHANGE UP (ref 0–0)
PLATELET # BLD AUTO: 195 K/UL — SIGNIFICANT CHANGE UP (ref 130–400)
PMV BLD: 11.3 FL — HIGH (ref 7.4–10.4)
POTASSIUM SERPL-MCNC: 4.3 MMOL/L — SIGNIFICANT CHANGE UP (ref 3.5–5)
POTASSIUM SERPL-SCNC: 4.3 MMOL/L — SIGNIFICANT CHANGE UP (ref 3.5–5)
PROT SERPL-MCNC: 7 G/DL — SIGNIFICANT CHANGE UP (ref 6–8)
RBC # BLD: 4.27 M/UL — SIGNIFICANT CHANGE UP (ref 4.2–5.4)
RBC # FLD: 13.7 % — SIGNIFICANT CHANGE UP (ref 11.5–14.5)
SODIUM SERPL-SCNC: 136 MMOL/L — SIGNIFICANT CHANGE UP (ref 135–146)
WBC # BLD: 5.83 K/UL — SIGNIFICANT CHANGE UP (ref 4.8–10.8)
WBC # FLD AUTO: 5.83 K/UL — SIGNIFICANT CHANGE UP (ref 4.8–10.8)

## 2024-06-05 PROCEDURE — 99284 EMERGENCY DEPT VISIT MOD MDM: CPT

## 2024-06-05 PROCEDURE — 80053 COMPREHEN METABOLIC PANEL: CPT

## 2024-06-05 PROCEDURE — 76705 ECHO EXAM OF ABDOMEN: CPT | Mod: 26

## 2024-06-05 PROCEDURE — 85025 COMPLETE CBC W/AUTO DIFF WBC: CPT

## 2024-06-05 PROCEDURE — 76705 ECHO EXAM OF ABDOMEN: CPT

## 2024-06-05 PROCEDURE — 36415 COLL VENOUS BLD VENIPUNCTURE: CPT

## 2024-06-05 PROCEDURE — 99284 EMERGENCY DEPT VISIT MOD MDM: CPT | Mod: 25

## 2024-06-05 RX ORDER — SODIUM CHLORIDE 9 MG/ML
1000 INJECTION INTRAMUSCULAR; INTRAVENOUS; SUBCUTANEOUS ONCE
Refills: 0 | Status: DISCONTINUED | OUTPATIENT
Start: 2024-06-05 | End: 2024-06-05

## 2024-06-05 NOTE — ED PROVIDER NOTE - NSFOLLOWUPINSTRUCTIONS_ED_ALL_ED_FT
Please follow up with Gastroenterology in 1-3 days     ******** Our Emergency Department Referral Coordinators will be reaching out to you in the next 24-48 hours from 9:00am to 5:00pm with a follow up appointment. Please expect a phone call from the hospital in that time frame. If you do not receive a call or if you have any questions or concerns, you can reach them at [phone]

## 2024-06-05 NOTE — ED PROVIDER NOTE - OBJECTIVE STATEMENT
63-year-old female history of fibromyalgia, Perez's esophagitis, hypothyroid presenting to the ED for evaluation of elevated LFTs.  States she had blood work drawn 5/7 was called today and informed to come to ED because levels were elevated.  Denies any complaints, discomforts here in the ED

## 2024-06-05 NOTE — ED PROVIDER NOTE - PATIENT PORTAL LINK FT
You can access the FollowMyHealth Patient Portal offered by [facility] by registering at the following website: [email or web address]. By joining [facility]’s FollowMyHealth portal, you will also be able to view your health information using other applications (apps) compatible with our system.

## 2024-06-05 NOTE — ED PROVIDER NOTE - PHYSICAL EXAMINATION
VITAL SIGNS: I have reviewed nursing notes and confirm.  CONSTITUTIONAL: in no acute distress.  SKIN: Skin exam is warm and dry, no acute rash.  HEAD: Normocephalic; atraumatic.  EYES: EOM intact; conjunctiva and sclera clear.  ENT: No nasal discharge; airway clear.   NECK: Supple; non tender.  CARD: S1, S2 normal; no murmurs, gallops, or rubs. Regular rate and rhythm.  RESP: No wheezes, rales or rhonchi. Speaking in full sentences.   ABD: Normal bowel sounds; soft; non-distended; non-tender; No rebound or guarding.   EXT: Normal ROM. No clubbing, cyanosis or edema.  NEURO: Alert, oriented. Grossly unremarkable. No focal deficits.

## 2024-06-05 NOTE — ED PROVIDER NOTE - NSCAREINITIATED _GEN_ER
NAME Arnold Resendez    MRN [id number]    DATE 12/12/2019    Preoperative diagnosis: Appendicitis    Postoperative diagnosis:Same    Procedure: Laparoscopic appendectomy    Anesthesia: General endotracheal     Anesthesiologist: Terrence Victor MD    Findings:  Suppurative appendicitis    Surgeon: JOSH Basurto    First Assistant: LUIS ANTONIO Hutton    EBL: <50 ml    Complications:None    Description of the procedure:     With the patient in the supine position, under general anesthesia, the abdomen was prepped and sterile drapes were placed per routine. An infraumbilical incision was made. The fascia was secured with 2-0 Vicryl suture. The fascia was incised and the peritoneal cavity was entered. A Nemo canula was inserted and pneumoperitoneum was instituted.    Additional 5 mm trocars were placed under direct visualization in the suprapubic and LLQ regions. The appendix was grasped and the mesoappendix was dissected. The artery was cauterized and transected with ligasure. The appendiceal base was ligated with laparoscopic stapler .    The appendix was placed in an endopouch and removed from the abdomen. Hemostasis was adequate.     All ports were injected with bupivicaine solution. The trocars were removed under direct visualization and hemostasis was adequate. The fascia was closed with 0 Vicryl and the skin with 4-0 Monocryl.    The patient tolerated the procedure well   .  The patient was moved to PACU .    Edu Basurto M.D.  Acute Care Surgery  [phone]      
Alber Delgado(PA)

## 2024-06-05 NOTE — ED ADULT NURSE NOTE - NSFALLUNIVINTERV_ED_ALL_ED
Bed/Stretcher in lowest position, wheels locked, appropriate side rails in place/Call bell, personal items and telephone in reach/Instruct patient to call for assistance before getting out of bed/chair/stretcher/Non-slip footwear applied when patient is off stretcher/[place] to call system/Physically safe environment - no spills, clutter or unnecessary equipment/Purposeful proactive rounding/Room/bathroom lighting operational, light cord in reach
Calm

## 2024-06-05 NOTE — ED PROVIDER NOTE - ATTENDING APP SHARED VISIT CONTRIBUTION OF CARE
63-year-old female past med history of fibromyalgia, hypertension, hypothyroid, Perez's esophagus presents with elevated liver function test.  Patient reports that she went to see her gastroenterologist 1 month ago and had blood work done there.  At that time had elevated LFTs in the 500s.  Patient then follow-up with her primary care physician and patient was made aware of the elevated LFTs at that time.  PMD advised her to follow-up with a GI doctor.  When she spoke to the GI doctor, they advised her to come to the ED for further management.  Patient denies any fevers chills.  No nausea vomit diarrhea.  No abdominal pain.  No similar episodes in the past.    CONSTITUTIONAL: Well-developed; well-nourished; in no acute distress.   SKIN: warm, dry  HEAD: Normocephalic; atraumatic.  EYES: PERRL, EOMI, no conjunctival erythema  ENT: No nasal discharge; airway clear.  NECK: Supple; non tender.  CARD: S1, S2 normal;  Regular rate and rhythm.   RESP: No wheezes, rales or rhonchi.  ABD: soft non tender, non distended, no rebound or guarding  EXT: Normal ROM.  5/5 strength in all 4 extremities   LYMPH: No acute cervical adenopathy.  NEURO: Alert, oriented, grossly unremarkable. neurovascularly intact  PSYCH: Cooperative, appropriate.

## 2024-06-05 NOTE — ED ADULT NURSE NOTE - NS ED NURSE LEVEL OF CONSCIOUSNESS MENTAL STATUS
Awake/Alert/Cooperative 89M with pmh COPD on home O2 (2 L NC), Former Smoker, PUD, CAD, CHF, HTN, HLD, AAA s/p repair, found to have pna (possible aspiration versus CAP) presents with a mild oropharyngeal dysphagia. The swallow sequence is characterized by reduced rotary chew, reduced bolus formation/control/AP transit, premature spillover of material to the pharynx on all textures and delayed pharyngeal swallows. Laryngeal penetration is noted on nectar thick and thin fluids. Penetrated material is retrieved from the laryngeal vestibule. No aspiration evident on exam. There is post swallow residue most notable in the valleculae.   Disorders: reduced lingual strength/ROM/Rate of motion, reduced BOT to posterior pharyngeal wall contact, delay in trigger of the swallow reflex, reduced hyo-laryngeal excursion, reduced supraglottic sensation.

## 2024-06-05 NOTE — ED PROVIDER NOTE - CARE PROVIDER_API CALL
Francisco Addison  Gastroenterology  [address] [place] [address]  Phone: [phone]  Fax: [phone]  Follow Up Time:

## 2024-06-05 NOTE — ED PROVIDER NOTE - PROGRESS NOTE DETAILS
Danny: Spoke with patient about results from ultrasound and informed patient that routinely require further workup with more advanced imaging.  Patient states that she does not want to stay and will follow-up tomorrow with all GI doctor.  You shared decision making patient here in ED asymptomatic for entire stay.  Plan to DC home with rapid GI follow-up

## 2024-06-05 NOTE — ED PROVIDER NOTE - CLINICAL SUMMARY MEDICAL DECISION MAKING FREE TEXT BOX
Patient presents with elevated liver function test found on blood work done 1 month prior.  Patient asymptomatic.  Denies any nausea vomiting abdominal pain.  Nontender on exam.  Labs and sonogram done.  LFTs in the 200s today.  Sonogram shows dilated CBD with possibility of choledocholithiasis.  Patient asymptomatic at this time.  Risk-benefit discussion with patient and her  about further testing in the ED versus outpatient management.  Patient requesting to follow-up with her primary care and gastroenterologist.  All results discussed and copy printed.  Patient discharged with strict return precautions.

## 2024-06-06 ENCOUNTER — NON-APPOINTMENT (OUTPATIENT)
Age: 64
End: 2024-06-06

## 2024-06-07 ENCOUNTER — APPOINTMENT (OUTPATIENT)
Dept: GASTROENTEROLOGY | Facility: CLINIC | Age: 64
End: 2024-06-07
Payer: COMMERCIAL

## 2024-06-07 VITALS
HEART RATE: 72 BPM | WEIGHT: 113.6 LBS | SYSTOLIC BLOOD PRESSURE: 115 MMHG | OXYGEN SATURATION: 98 % | BODY MASS INDEX: 19.39 KG/M2 | HEIGHT: 64 IN | DIASTOLIC BLOOD PRESSURE: 71 MMHG

## 2024-06-07 DIAGNOSIS — K59.00 CONSTIPATION, UNSPECIFIED: ICD-10-CM

## 2024-06-07 DIAGNOSIS — K62.5 HEMORRHAGE OF ANUS AND RECTUM: ICD-10-CM

## 2024-06-07 DIAGNOSIS — K22.70 BARRETT'S ESOPHAGUS W/OUT DYSPLASIA: ICD-10-CM

## 2024-06-07 DIAGNOSIS — R74.8 ABNORMAL LEVELS OF OTHER SERUM ENZYMES: ICD-10-CM

## 2024-06-07 DIAGNOSIS — K80.20 CALCULUS OF GALLBLADDER W/OUT CHOLECYSTITIS W/OUT OBSTRUCTION: ICD-10-CM

## 2024-06-07 DIAGNOSIS — K83.8 OTHER SPECIFIED DISEASES OF BILIARY TRACT: ICD-10-CM

## 2024-06-07 LAB
INR PPP: 0.91 RATIO
PT BLD: 10.4 SEC

## 2024-06-07 PROCEDURE — 99214 OFFICE O/P EST MOD 30 MIN: CPT

## 2024-06-07 RX ORDER — PANTOPRAZOLE 40 MG/1
40 TABLET, DELAYED RELEASE ORAL TWICE DAILY
Qty: 180 | Refills: 3 | Status: DISCONTINUED | COMMUNITY
Start: 2019-06-14 | End: 2024-06-07

## 2024-06-07 RX ORDER — PANTOPRAZOLE 40 MG/1
40 TABLET, DELAYED RELEASE ORAL DAILY
Qty: 1 | Refills: 6 | Status: ACTIVE | COMMUNITY
Start: 2024-06-07 | End: 1900-01-01

## 2024-06-07 RX ORDER — MORPHINE SULFATE 60 MG/1
60 CAPSULE, EXTENDED RELEASE ORAL
Refills: 0 | Status: ACTIVE | COMMUNITY

## 2024-06-07 RX ORDER — LORAZEPAM 1 MG/1
1 TABLET ORAL
Refills: 0 | Status: ACTIVE | COMMUNITY

## 2024-06-07 RX ORDER — AMLODIPINE BESYLATE 5 MG/1
TABLET ORAL
Refills: 0 | Status: ACTIVE | COMMUNITY

## 2024-06-07 RX ORDER — CHROMIUM 200 MCG
TABLET ORAL
Refills: 0 | Status: ACTIVE | COMMUNITY

## 2024-06-07 RX ORDER — POLYETHYLENE GLYCOL 3350 AND ELECTROLYTES WITH LEMON FLAVOR 236; 22.74; 6.74; 5.86; 2.97 G/4L; G/4L; G/4L; G/4L; G/4L
236 POWDER, FOR SOLUTION ORAL
Qty: 1 | Refills: 1 | Status: ACTIVE | COMMUNITY
Start: 2024-02-05 | End: 1900-01-01

## 2024-06-07 RX ORDER — LEVOTHYROXINE SODIUM 0.05 MG/1
50 TABLET ORAL
Refills: 0 | Status: ACTIVE | COMMUNITY

## 2024-06-10 NOTE — ASSESSMENT
[FreeTextEntry1] : 63 yr old female with HTN, Hypothyroidism, Fibromyalgia, Perez's Esophagus, was hospitalized 1/24 with AMS at home. Stroke code was followed in the ED and CTA was negative for an acute stroke. She was found to have Influenza A with bilateral pneumonia and was intubated. 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 She saw Neurologist Dr. Garcia after discharge and he said the condition is benign without further F/U needed. CBC done on 1/24/24 showed a drop in Hgb of 9.1, it was 13 on 12/29/24. Iron studies showed low Iron saturation of 14. Repeat CBC done 5/7 with normal Hgb of 12.9.  Last EGD done 7/19 showed no H Pylori, IM, or dysplasia. Colonoscopy done 7/19 showed no colon polyps normal colonic mucosa, internal and external hemorrhoids.  She presents today with Increased LFTs noted 5/7/24. She was told to go to the ED which she went to [facility] ED on 6/5/24; Alk Phos 267  , total veronica normal. US of Abdomen showed gallstones with dilated CBD of 9 mm. Pt denied abdominal pain, vomiting, fevers, jaundice, heartburn, dysphagia, melena. She has lost 11 lbs due to following a low fat, low Carb diet. She adheres to a high fiber diet with regular BMs q day and she has not had any rectal bleeding x about 2 months. She has noted nausea since she decreased the pantoprazole to OD. EGD and Colonoscopy were supposed to be scheduled on her last visit but were not scheduled yet.     #Increased LFTs #Gallstones with Dilated CBD, R/O Choledocholithiasis - Will have [facility] MD Dr. Quispe do an EGD with EUS to further evaluate the bile ducts and pancreas with possible ERCP if there is evidence of stones in the CBD; risks and benefits discussed - Repeat CMP next week before the procedure  - F/U after the procedure is done with Dr. Quispe  #Iron Deficiency Anemia #H/O Perez's Esophagus #CRC Screening - Continue Pantoprazole 40 mg OD - EGD to be done by Dr. Quispe as noted above - Will schedule a colonoscopy as part of JUAREZ W/U in 1-2 months; risks and benefits discussed - F/U after procedure is done  #Rectal bleeding resolved, likely due to hemorrhoids

## 2024-06-10 NOTE — PHYSICAL EXAM
[Alert] : alert [Normal Voice/Communication] : normal voice/communication [No Acute Distress] : no acute distress [Well Developed] : well developed [Sclera] : the sclera and conjunctiva were normal [Hearing Threshold Finger Rub Not [place]] : hearing was normal [Normal Lips/Gums] : the lips and gums were normal [Oropharynx] : the oropharynx was normal [Normal Appearance] : the appearance of the neck was normal [No Respiratory Distress] : no respiratory distress [No Acc Muscle Use] : no accessory muscle use [Respiration, Rhythm And Depth] : normal respiratory rhythm and effort [Auscultation Breath Sounds / Voice Sounds] : lungs were clear to auscultation bilaterally [Heart Rate And Rhythm] : heart rate was normal and rhythm regular [Normal S1, S2] : normal S1 and S2 [Bowel Sounds] : normal bowel sounds [Abdomen Tenderness] : non-tender [No Masses] : no abdominal mass palpated [Abdomen Soft] : soft [Abnormal Walk] : normal gait [Normal Color / Pigmentation] : normal skin color and pigmentation [Oriented To Time, Place, And Person] : oriented to person, place, and time [de-identified] : Thin

## 2024-06-10 NOTE — END OF VISIT
[] : Fellow [FreeTextEntry3] :  Pt seen/examined, agree with above findings and plan as outlined by Diane D'Amico, PA. Pt presents for follow up with marked LFT elevation went to ED revealing gallstones and dilated CBD. Pt asymptomatic and preferred outpt follow up. Recommend repeat LFTs and schedule EGD/EUS, possible ERCP with Dr. Quispe. Plan for colonoscopy after EUS. Pt/pts  agreeable with plan.

## 2024-06-10 NOTE — HISTORY OF PRESENT ILLNESS
[FreeTextEntry1] : LAST VISIT - 2/5/24 63 yr old female with HTN, Hypothyroidism, Fibromyalgia, Perez's Esophagus, was hospitalized 1/24 with AMS at home. Stroke code was followed in the ED and CTA was negative for an acute stroke. She was found to have Influenza A with bilateral pneumonia and was intubated. 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 She saw Neurologist Dr. Garcia after discharge and he said the condition is benign without further F/U needed. CBC done on 1/24/24 showed a drop in Hgb of 9.1, it was 13 on 12/29/24. Iron studies showed low Iron saturation of 14. Repeat CBC done 5/7 with normal Hgb of 12.9.  Last EGD done 7/19 showed no H Pylori, IM, or dysplasia. Colonoscopy done 7/19 showed no colon polyps normal colonic mucosa, internal and external hemorrhoids.  She presents today with Increased LFTs noted 5/7/24. She was told to go to the ED which she went to [facility] ED on 6/5/24; Alk Phos 267  , total veronica normal. US of Abdomen showed gallstones with dilated CBD of 9 mm. Pt preferred outpt follow up as she had no symptoms. Pt denied abdominal pain, vomiting, fevers, jaundice, heartburn, dysphagia, melena. She has lost 11 lbs due to following a low fat, low Carb diet. She adheres to a high fiber diet with regular BMs q day and she has not had any rectal bleeding x about 2 months. She has noted nausea since she decreased the pantoprazole to OD. EGD and Colonoscopy were supposed to be scheduled on her last visit but were not scheduled yet.

## 2024-06-14 ENCOUNTER — OUTPATIENT (OUTPATIENT)
Dept: OUTPATIENT SERVICES | Facility: HOSPITAL | Age: 64
LOS: 1 days | Discharge: HOME | End: 2024-06-14
Payer: COMMERCIAL

## 2024-06-14 ENCOUNTER — TRANSCRIPTION ENCOUNTER (OUTPATIENT)
Age: 64
End: 2024-06-14

## 2024-06-14 ENCOUNTER — RESULT REVIEW (OUTPATIENT)
Age: 64
End: 2024-06-14

## 2024-06-14 VITALS
WEIGHT: 113.1 LBS | SYSTOLIC BLOOD PRESSURE: 159 MMHG | HEIGHT: 64 IN | TEMPERATURE: 97 F | DIASTOLIC BLOOD PRESSURE: 55 MMHG | RESPIRATION RATE: 18 BRPM | HEART RATE: 75 BPM

## 2024-06-14 VITALS
DIASTOLIC BLOOD PRESSURE: 63 MMHG | SYSTOLIC BLOOD PRESSURE: 138 MMHG | HEART RATE: 74 BPM | OXYGEN SATURATION: 94 % | RESPIRATION RATE: 18 BRPM

## 2024-06-14 DIAGNOSIS — D50.9 IRON DEFICIENCY ANEMIA, UNSPECIFIED: ICD-10-CM

## 2024-06-14 DIAGNOSIS — Z98.890 OTHER SPECIFIED POSTPROCEDURAL STATES: Chronic | ICD-10-CM

## 2024-06-14 DIAGNOSIS — K22.70 BARRETT'S ESOPHAGUS WITHOUT DYSPLASIA: ICD-10-CM

## 2024-06-14 PROCEDURE — 43239 EGD BIOPSY SINGLE/MULTIPLE: CPT | Mod: XU

## 2024-06-14 PROCEDURE — 88312 SPECIAL STAINS GROUP 1: CPT | Mod: 26

## 2024-06-14 PROCEDURE — 43237 ENDOSCOPIC US EXAM ESOPH: CPT | Mod: XU

## 2024-06-14 PROCEDURE — 74018 RADEX ABDOMEN 1 VIEW: CPT

## 2024-06-14 PROCEDURE — 88305 TISSUE EXAM BY PATHOLOGIST: CPT

## 2024-06-14 PROCEDURE — 43262 ENDO CHOLANGIOPANCREATOGRAPH: CPT | Mod: XU

## 2024-06-14 PROCEDURE — 88312 SPECIAL STAINS GROUP 1: CPT

## 2024-06-14 PROCEDURE — 43264 ERCP REMOVE DUCT CALCULI: CPT

## 2024-06-14 PROCEDURE — C1889: CPT

## 2024-06-14 PROCEDURE — 88305 TISSUE EXAM BY PATHOLOGIST: CPT | Mod: 26

## 2024-06-14 PROCEDURE — C1769: CPT

## 2024-06-14 PROCEDURE — 74328 X-RAY BILE DUCT ENDOSCOPY: CPT | Mod: 26

## 2024-06-14 RX ORDER — LEVOTHYROXINE SODIUM 125 MCG
1 TABLET ORAL
Refills: 0 | DISCHARGE

## 2024-06-14 RX ORDER — AMITRIPTYLINE HCL 25 MG
1 TABLET ORAL
Refills: 0 | DISCHARGE

## 2024-06-14 RX ORDER — PANTOPRAZOLE SODIUM 20 MG/1
1 TABLET, DELAYED RELEASE ORAL
Refills: 0 | DISCHARGE

## 2024-06-14 RX ORDER — ESCITALOPRAM OXALATE 10 MG/1
1 TABLET, FILM COATED ORAL
Refills: 0 | DISCHARGE

## 2024-06-14 RX ORDER — INDOMETHACIN 50 MG
100 CAPSULE ORAL ONCE
Refills: 0 | Status: DISCONTINUED | OUTPATIENT
Start: 2024-06-14 | End: 2024-06-14

## 2024-06-14 RX ORDER — TIZANIDINE 4 MG/1
2 TABLET ORAL
Refills: 0 | DISCHARGE

## 2024-06-14 RX ORDER — MORPHINE SULFATE 50 MG/1
0 CAPSULE, EXTENDED RELEASE ORAL
Refills: 0 | DISCHARGE

## 2024-06-14 NOTE — ASU DISCHARGE PLAN (ADULT/PEDIATRIC) - CARE PROVIDER_API CALL
Adan Quispe  Gastroenterology  [address] [place] [id number]  Phone: [phone]  Fax: [phone]  Follow Up Time:

## 2024-06-14 NOTE — ASU DISCHARGE PLAN (ADULT/PEDIATRIC) - ASU DC SPECIAL INSTRUCTIONSFT
-Watch for post- ERCP complications of abdominal pain and bleeding  -Clear liquid diet today and advance as tolerated to low fat diet tomorrow  -Follow up in office in 2 to 3 weeks

## 2024-06-14 NOTE — ASU PATIENT PROFILE, ADULT - FALL HARM RISK - UNIVERSAL INTERVENTIONS
Bed in lowest position, wheels locked, appropriate side rails in place/Call bell, personal items and telephone in reach/Instruct patient to call for assistance before getting out of bed or chair/Non-slip footwear when patient is out of bed/[place] to call system/Physically safe environment - no spills, clutter or unnecessary equipment/Purposeful Proactive Rounding/Room/bathroom lighting operational, light cord in reach

## 2024-06-14 NOTE — ASU PATIENT PROFILE, ADULT - VISION (WITH CORRECTIVE LENSES IF THE PATIENT USUALLY WEARS THEM):
TRANSFER - OUT REPORT:    Verbal report given to Baylee Zafar RN on Arlin Jackson  being transferred to [address] routine progression of care       Report consisted of patients Situation, Background, Assessment and   Recommendations(SBAR). Information from the following report(s) ED Summary was reviewed with the receiving nurse. Lines:   Peripheral IV 11/20/17 Right Antecubital (Active)        Opportunity for questions and clarification was provided.       Patient transported with:   [employer] Normal vision: sees adequately in most situations; can see medication labels, newsprint

## 2024-06-14 NOTE — PRE-ANESTHESIA EVALUATION ADULT - NSANTHOSAYNRD_GEN_A_CORE
No. ESTEFANIA screening performed.  STOP BANG Legend: 0-2 = LOW Risk; 3-4 = INTERMEDIATE Risk; 5-8 = HIGH Risk

## 2024-06-17 LAB — SURGICAL PATHOLOGY STUDY: SIGNIFICANT CHANGE UP

## 2024-06-21 DIAGNOSIS — K83.8 OTHER SPECIFIED DISEASES OF BILIARY TRACT: ICD-10-CM

## 2024-06-21 DIAGNOSIS — R74.01 ELEVATION OF LEVELS OF LIVER TRANSAMINASE LEVELS: ICD-10-CM

## 2024-06-21 DIAGNOSIS — F32.A DEPRESSION, UNSPECIFIED: ICD-10-CM

## 2024-06-21 DIAGNOSIS — K29.50 UNSPECIFIED CHRONIC GASTRITIS WITHOUT BLEEDING: ICD-10-CM

## 2024-06-21 DIAGNOSIS — M79.7 FIBROMYALGIA: ICD-10-CM

## 2024-06-21 DIAGNOSIS — K31.7 POLYP OF STOMACH AND DUODENUM: ICD-10-CM

## 2024-06-26 ENCOUNTER — NON-APPOINTMENT (OUTPATIENT)
Age: 64
End: 2024-06-26

## 2024-06-26 PROBLEM — K62.5 RECTAL BLEEDING: Status: ACTIVE | Noted: 2024-02-05

## 2024-06-26 PROBLEM — K22.70 BARRETT ESOPHAGUS: Status: ACTIVE | Noted: 2019-06-14

## 2024-06-26 PROBLEM — K83.8 DILATED BILE DUCT: Status: ACTIVE | Noted: 2024-06-07

## 2024-06-26 PROBLEM — K59.00 CONSTIPATION: Status: ACTIVE | Noted: 2024-02-05

## 2024-06-26 PROBLEM — K80.20 CHOLELITHIASIS: Status: ACTIVE | Noted: 2024-06-07

## 2024-06-26 PROBLEM — R74.8 INCREASED LIVER ENZYMES: Status: ACTIVE | Noted: 2024-06-07

## 2024-06-26 LAB
ALBUMIN SERPL ELPH-MCNC: 4.4 G/DL
ALBUMIN SERPL ELPH-MCNC: 4.4 G/DL
ALP BLD-CCNC: 247 U/L
ALP BLD-CCNC: 375 U/L
ALT SERPL-CCNC: 428 U/L
ALT SERPL-CCNC: 579 U/L
ANION GAP SERPL CALC-SCNC: 13 MMOL/L
ANION GAP SERPL CALC-SCNC: 18 MMOL/L
AST SERPL-CCNC: 135 U/L
AST SERPL-CCNC: 578 U/L
BILIRUB SERPL-MCNC: 0.3 MG/DL
BILIRUB SERPL-MCNC: 0.3 MG/DL
BUN SERPL-MCNC: 23 MG/DL
BUN SERPL-MCNC: 23 MG/DL
CALCIUM SERPL-MCNC: 10 MG/DL
CALCIUM SERPL-MCNC: 8.9 MG/DL
CHLORIDE SERPL-SCNC: 102 MMOL/L
CHLORIDE SERPL-SCNC: 99 MMOL/L
CO2 SERPL-SCNC: 23 MMOL/L
CO2 SERPL-SCNC: 26 MMOL/L
CREAT SERPL-MCNC: 1 MG/DL
CREAT SERPL-MCNC: 1.1 MG/DL
EGFR: 57 ML/MIN/1.73M2
EGFR: 64 ML/MIN/1.73M2
GLUCOSE SERPL-MCNC: 102 MG/DL
GLUCOSE SERPL-MCNC: 94 MG/DL
POTASSIUM SERPL-SCNC: 3.9 MMOL/L
POTASSIUM SERPL-SCNC: 4.5 MMOL/L
PROT SERPL-MCNC: 7.3 G/DL
PROT SERPL-MCNC: 7.5 G/DL
SODIUM SERPL-SCNC: 140 MMOL/L
SODIUM SERPL-SCNC: 141 MMOL/L

## 2024-07-10 ENCOUNTER — APPOINTMENT (OUTPATIENT)
Dept: GASTROENTEROLOGY | Facility: CLINIC | Age: 64
End: 2024-07-10
Payer: COMMERCIAL

## 2024-07-10 DIAGNOSIS — K80.20 CALCULUS OF GALLBLADDER W/OUT CHOLECYSTITIS W/OUT OBSTRUCTION: ICD-10-CM

## 2024-07-10 DIAGNOSIS — D50.9 IRON DEFICIENCY ANEMIA, UNSPECIFIED: ICD-10-CM

## 2024-07-10 DIAGNOSIS — R76.8 OTHER SPECIFIED ABNORMAL IMMUNOLOGICAL FINDINGS IN SERUM: ICD-10-CM

## 2024-07-10 DIAGNOSIS — R74.8 ABNORMAL LEVELS OF OTHER SERUM ENZYMES: ICD-10-CM

## 2024-07-10 PROCEDURE — 99213 OFFICE O/P EST LOW 20 MIN: CPT

## 2024-07-16 ENCOUNTER — OUTPATIENT (OUTPATIENT)
Dept: OUTPATIENT SERVICES | Facility: HOSPITAL | Age: 64
LOS: 1 days | Discharge: HOME | End: 2024-07-16
Payer: COMMERCIAL

## 2024-07-16 ENCOUNTER — TRANSCRIPTION ENCOUNTER (OUTPATIENT)
Age: 64
End: 2024-07-16

## 2024-07-16 ENCOUNTER — RESULT REVIEW (OUTPATIENT)
Age: 64
End: 2024-07-16

## 2024-07-16 VITALS
TEMPERATURE: 98 F | RESPIRATION RATE: 18 BRPM | HEIGHT: 64 IN | DIASTOLIC BLOOD PRESSURE: 74 MMHG | WEIGHT: 113.1 LBS | SYSTOLIC BLOOD PRESSURE: 132 MMHG | HEART RATE: 68 BPM | OXYGEN SATURATION: 97 %

## 2024-07-16 VITALS
DIASTOLIC BLOOD PRESSURE: 68 MMHG | TEMPERATURE: 97 F | SYSTOLIC BLOOD PRESSURE: 128 MMHG | HEART RATE: 70 BPM | RESPIRATION RATE: 16 BRPM | OXYGEN SATURATION: 98 %

## 2024-07-16 DIAGNOSIS — Z98.890 OTHER SPECIFIED POSTPROCEDURAL STATES: Chronic | ICD-10-CM

## 2024-07-16 DIAGNOSIS — K62.5 HEMORRHAGE OF ANUS AND RECTUM: ICD-10-CM

## 2024-07-16 DIAGNOSIS — K59.00 CONSTIPATION, UNSPECIFIED: ICD-10-CM

## 2024-07-16 PROCEDURE — 88305 TISSUE EXAM BY PATHOLOGIST: CPT | Mod: 26

## 2024-07-16 PROCEDURE — 45378 DIAGNOSTIC COLONOSCOPY: CPT

## 2024-07-16 PROCEDURE — 88305 TISSUE EXAM BY PATHOLOGIST: CPT

## 2024-07-16 RX ORDER — ONDANSETRON HYDROCHLORIDE 2 MG/ML
4 INJECTION INTRAMUSCULAR; INTRAVENOUS ONCE
Refills: 0 | Status: DISCONTINUED | OUTPATIENT
Start: 2024-07-16 | End: 2024-07-16

## 2024-07-16 RX ORDER — DEXTROSE MONOHYDRATE AND SODIUM CHLORIDE 5; .3 G/100ML; G/100ML
1000 INJECTION, SOLUTION INTRAVENOUS
Refills: 0 | Status: DISCONTINUED | OUTPATIENT
Start: 2024-07-16 | End: 2024-07-16

## 2024-07-16 NOTE — H&P PST ADULT - ASSESSMENT
62yo female with HTN, Hypothyroidism, Fibromyalgia, Perez's Esophagus presents for follow up  ?  #Elevated LFTs  #Biliary dilation  s/p EGD/EUS/ERCP on 6/14/24 revealing dilated CBD 11mm with biliary sludge s/p biliary sphincterotomy, sludge removal otherwise unremarkable. Likely component of narcotics contributing to biliary dilation vs passed stone/sludge. LFTs normalized. No other clear etiology, AI, hep panel negative, no new drugs or herbal supplements  #Elevated kappa, lambda FLCs  -Continue periodic LFT monitoring  -Refer to hematology for evaluation  -Avoid nonessential hepatoxic medications  ?  #Iron Deficiency Anemia  #H/O Perez's Esophagus  #CRC Screening  #Intermittent rectal bleeding  s/p EGD done 7/19 showed no H Pylori, IM, or dysplasia. Colonoscopy done 7/19 showed no colon polyps normal colonic mucosa, internal and external hemorrhoids. Recent EGD showing reactive gastropathy, no evidence of H pylori.  - Continue Pantoprazole 40 mg daily  - Plan for colonoscopy as scheduled 7/2024  ?  Follow up 2 months

## 2024-07-16 NOTE — ASU PREOP CHECKLIST - BOWEL PREP
Please follow instructions on patient education material.    .  Return to urgent care in 2 to 3 days if symptoms are not improving, immediately if you develop any new or worsening symptoms.   STD testing and Urine culture pending  - Start antibiotics today, if your urine culture results show that you need to change antibiotics to clear your infection, we will know that in 3 days. If you need a change, we will automatically send new medication to your pharmacy and call you to let you know we did that. Otherwise, if you do not hear from us, finish your entire antibiotic Rx.  - abstain from sex until all results are known  - urine tests take up to 7 days to result      - this clinic will ONLY call you for POSITIVE = ABNORMAL results.   We will not call you to tell you tests are NEGATIVE = NORMAL.    - if you need medication to treat any conditions, it was either prescribed to you today and sent to your pharmacy, or it will be sent when providers view abnormal results.  - if any of your tests are abnormal, we will call you with a plan of care.  - always require condoms  - partners will need treatment if  any +STDs on your testing. They have to have their own visit, we do not automatically treat them.     See patient education for further guidance.  Increase water intake to no less than 4 16oz bottles per day, every day.      If you develop abdominal swelling, decreased urine output, fever, vomiting, chills, body aches or severe flank pain, please go immediately to the emergency room.  
GoLYTELY/done

## 2024-07-16 NOTE — PACU DISCHARGE NOTE - COMMENTS
63 y o female S/P Colonoscopy, Polypectomy, TIVA without complications. VS /56 HR 62 RR 16 T 97.3 SaO2 99%. Pt tolerated procedure well.

## 2024-07-16 NOTE — ASU DISCHARGE PLAN (ADULT/PEDIATRIC) - CARE PROVIDER_API CALL
Nellie Sauceda  Gastroenterology  [address] [facility] [facility] [place] [address]  Phone: [phone]  Fax: [phone]  Follow Up Time:

## 2024-07-16 NOTE — H&P PST ADULT - HISTORY OF PRESENT ILLNESS
64yo female with HTN, Hypothyroidism, Fibromyalgia, Perez's Esophagus presents for follow up  ?  #Elevated LFTs  #Biliary dilation  s/p EGD/EUS/ERCP on 6/14/24 revealing dilated CBD 11mm with biliary sludge s/p biliary sphincterotomy, sludge removal otherwise unremarkable. Likely component of narcotics contributing to biliary dilation vs passed stone/sludge. LFTs normalized. No other clear etiology, AI, hep panel negative, no new drugs or herbal supplements  #Elevated kappa, lambda FLCs  -Continue periodic LFT monitoring  -Refer to hematology for evaluation  -Avoid nonessential hepatoxic medications  ?  #Iron Deficiency Anemia  #H/O Perez's Esophagus  #CRC Screening  #Intermittent rectal bleeding  s/p EGD done 7/19 showed no H Pylori, IM, or dysplasia. Colonoscopy done 7/19 showed no colon polyps normal colonic mucosa, internal and external hemorrhoids. Recent EGD showing reactive gastropathy, no evidence of H pylori.  - Continue Pantoprazole 40 mg daily  - Plan for colonoscopy as scheduled 7/2024  ?  Follow up 2 months

## 2024-07-16 NOTE — ASU PATIENT PROFILE, ADULT - NSICDXPASTMEDICALHX_GEN_ALL_CORE_FT
PAST MEDICAL HISTORY:  Depression, unspecified depression type     Eczema, unspecified type     Fibromyalgia     HTN (hypertension)     Hypothyroid

## 2024-07-18 LAB — SURGICAL PATHOLOGY STUDY: SIGNIFICANT CHANGE UP

## 2024-07-19 ENCOUNTER — TRANSCRIPTION ENCOUNTER (OUTPATIENT)
Age: 64
End: 2024-07-19

## 2024-07-19 PROBLEM — E03.9 HYPOTHYROIDISM, UNSPECIFIED: Chronic | Status: ACTIVE | Noted: 2024-07-16

## 2024-07-19 PROBLEM — I10 ESSENTIAL (PRIMARY) HYPERTENSION: Chronic | Status: ACTIVE | Noted: 2024-07-16

## 2024-07-20 ENCOUNTER — TRANSCRIPTION ENCOUNTER (OUTPATIENT)
Age: 64
End: 2024-07-20

## 2024-07-22 DIAGNOSIS — K63.5 POLYP OF COLON: ICD-10-CM

## 2024-07-22 DIAGNOSIS — I10 ESSENTIAL (PRIMARY) HYPERTENSION: ICD-10-CM

## 2024-07-22 DIAGNOSIS — K64.4 RESIDUAL HEMORRHOIDAL SKIN TAGS: ICD-10-CM

## 2024-07-22 DIAGNOSIS — K62.5 HEMORRHAGE OF ANUS AND RECTUM: ICD-10-CM

## 2024-07-22 DIAGNOSIS — E03.9 HYPOTHYROIDISM, UNSPECIFIED: ICD-10-CM

## 2024-07-22 DIAGNOSIS — M79.7 FIBROMYALGIA: ICD-10-CM

## 2024-08-02 ENCOUNTER — APPOINTMENT (OUTPATIENT)
Dept: ORTHOPEDIC SURGERY | Facility: CLINIC | Age: 64
End: 2024-08-02

## 2024-08-02 DIAGNOSIS — S82.034A NONDISPLACED TRANSVERSE FRACTURE OF RIGHT PATELLA, INITIAL ENCOUNTER FOR CLOSED FRACTURE: ICD-10-CM

## 2024-08-02 PROCEDURE — 27520 TREAT KNEECAP FRACTURE: CPT | Mod: RT

## 2024-08-02 PROCEDURE — 73560 X-RAY EXAM OF KNEE 1 OR 2: CPT | Mod: RT

## 2024-08-02 PROCEDURE — 99203 OFFICE O/P NEW LOW 30 MIN: CPT

## 2024-08-02 NOTE — HISTORY OF PRESENT ILLNESS
[de-identified] : 63-year-old woman referred to my office for management of nondisplaced/minimally displaced right patella fracture sustained as a result of a ground-level fall on July 18, 2024.  Seen initially at [facility] she was subsequently referred to my office for treatment.  Patient remains in a knee immobilizer.  She is walking with a walker.  She has a past orthopedic history significant for right total hip replacement by Dr. Bonnie Cervantes in 2016.  This continues to work well.  Patient reports the pain has improved.  She reports that it is 2 out of 10 currently.  She remains on ibuprofen twice a day for pain relief.  Past medical history Hypothyroidism Hypertension Anxiety/depression GERD Fibromyalgia Osteopenia  Medications:  Amlodipine Levothyroxine Lexapro Ativan as needed Pantoprazole Morphine extended release  NKDA  Social: No cigarette history, no EtOH, no drug use, walks without assistive device at baseline , lives with  currently contending with rectal cancer, on disability

## 2024-08-02 NOTE — ASSESSMENT
[FreeTextEntry1] : 63-year-old woman minimally displaced right patella fracture amenable to continue nonoperative management.  Recommend immobilization of the knee in extension for a total of 6 weeks.  I have reviewed my recommendations and the diagnosis with the patient.  Answered questions to her satisfaction she agrees with this plan.  Will have her follow-up in my office in about a month for repeat evaluation with radiographs of her right knee.  Any problems she is welcome back sooner.  All questions were answered to her and her son satisfaction.  She can weight-bear in the knee brace but when she is not weightbearing she can take the knee brace off as long as she keeps her knee straight.

## 2024-08-02 NOTE — IMAGING
[de-identified] : Pleasant late middle-aged woman sits reasonably comfortably in my office.  She accompanied by her son in the exam room.  Physical examination: Right knee: Abrasions over the anterior aspects of her knee which be in the process of healing.  Moderate swelling.  No effusion.  She is able to hold her leg in full extension without assistance against gravity.  She can extend to 5 degrees against extension.  Calf is soft no cords  Radiographs: Right knee (AP, lateral): Minimally displaced right patella fracture.

## 2024-08-13 ENCOUNTER — OUTPATIENT (OUTPATIENT)
Dept: OUTPATIENT SERVICES | Facility: HOSPITAL | Age: 64
LOS: 1 days | End: 2024-08-13
Payer: COMMERCIAL

## 2024-08-13 ENCOUNTER — LABORATORY RESULT (OUTPATIENT)
Age: 64
End: 2024-08-13

## 2024-08-13 ENCOUNTER — APPOINTMENT (OUTPATIENT)
Age: 64
End: 2024-08-13
Payer: COMMERCIAL

## 2024-08-13 VITALS
WEIGHT: 114 LBS | DIASTOLIC BLOOD PRESSURE: 63 MMHG | HEIGHT: 64 IN | BODY MASS INDEX: 19.46 KG/M2 | HEART RATE: 74 BPM | OXYGEN SATURATION: 99 % | TEMPERATURE: 98.3 F | SYSTOLIC BLOOD PRESSURE: 109 MMHG | RESPIRATION RATE: 16 BRPM

## 2024-08-13 DIAGNOSIS — Z98.890 OTHER SPECIFIED POSTPROCEDURAL STATES: Chronic | ICD-10-CM

## 2024-08-13 DIAGNOSIS — R76.8 OTHER SPECIFIED ABNORMAL IMMUNOLOGICAL FINDINGS IN SERUM: ICD-10-CM

## 2024-08-13 DIAGNOSIS — D72.9 DISORDER OF WHITE BLOOD CELLS, UNSPECIFIED: ICD-10-CM

## 2024-08-13 LAB
HCT VFR BLD CALC: 39.4 %
HGB BLD-MCNC: 12.9 G/DL
MCHC RBC-ENTMCNC: 28.1 PG
MCHC RBC-ENTMCNC: 32.7 G/DL
MCV RBC AUTO: 85.8 FL
PLATELET # BLD AUTO: 331 K/UL
PMV BLD: 10.5 FL
RBC # BLD: 4.59 M/UL
RBC # FLD: 13 %
WBC # FLD AUTO: 7.26 K/UL

## 2024-08-13 PROCEDURE — 82728 ASSAY OF FERRITIN: CPT

## 2024-08-13 PROCEDURE — 80076 HEPATIC FUNCTION PANEL: CPT

## 2024-08-13 PROCEDURE — 84155 ASSAY OF PROTEIN SERUM: CPT

## 2024-08-13 PROCEDURE — 83521 IG LIGHT CHAINS FREE EACH: CPT

## 2024-08-13 PROCEDURE — 99205 OFFICE O/P NEW HI 60 MIN: CPT

## 2024-08-13 PROCEDURE — 83550 IRON BINDING TEST: CPT

## 2024-08-13 PROCEDURE — 80048 BASIC METABOLIC PNL TOTAL CA: CPT

## 2024-08-13 PROCEDURE — 83540 ASSAY OF IRON: CPT

## 2024-08-13 PROCEDURE — 82977 ASSAY OF GGT: CPT

## 2024-08-13 PROCEDURE — 84165 PROTEIN E-PHORESIS SERUM: CPT

## 2024-08-13 PROCEDURE — 85027 COMPLETE CBC AUTOMATED: CPT

## 2024-08-13 PROCEDURE — 82784 ASSAY IGA/IGD/IGG/IGM EACH: CPT

## 2024-08-13 PROCEDURE — 86334 IMMUNOFIX E-PHORESIS SERUM: CPT

## 2024-08-13 NOTE — ASSESSMENT
[FreeTextEntry1] : 62 yo woman with normal hg, normal GFR, normal calcium, no bone pain.  Explained that FLC ratio is normal . Same is with IG G/A/M Do not see the result of SPEP.  Not clear why plasma cell disorder w/ u was initiated : will complete the w/u although LFT were elevated temporarily  in addition will repeat iron labs  f/u given

## 2024-08-13 NOTE — HISTORY OF PRESENT ILLNESS
[de-identified] : 64 YO woman is evaluated to clarify findings of free light chains result. Pt was evaluated by GI for elevated LFT:   FLC were sent. She has no anemia or hypercalcemia or renal failure; denies wt loss, night sweats,fatigue, bone pain

## 2024-08-14 DIAGNOSIS — R76.8 OTHER SPECIFIED ABNORMAL IMMUNOLOGICAL FINDINGS IN SERUM: ICD-10-CM

## 2024-08-14 LAB
ALBUMIN SERPL ELPH-MCNC: 4.4 G/DL
ALP BLD-CCNC: 108 U/L
ALT SERPL-CCNC: 26 U/L
ANION GAP SERPL CALC-SCNC: 15 MMOL/L
AST SERPL-CCNC: 19 U/L
BILIRUB DIRECT SERPL-MCNC: <0.2 MG/DL
BILIRUB INDIRECT SERPL-MCNC: >0.1 MG/DL
BILIRUB SERPL-MCNC: 0.3 MG/DL
BUN SERPL-MCNC: 32 MG/DL
CALCIUM SERPL-MCNC: 10 MG/DL
CHLORIDE SERPL-SCNC: 96 MMOL/L
CO2 SERPL-SCNC: 25 MMOL/L
CREAT SERPL-MCNC: 1.1 MG/DL
EGFR: 57 ML/MIN/1.73M2
FERRITIN SERPL-MCNC: 100 NG/ML
GGT SERPL-CCNC: 16 U/L
GLUCOSE SERPL-MCNC: 114 MG/DL
IGA SER QL IEP: 432 MG/DL
IGM SER QL IEP: 214 MG/DL
IRON SATN MFR SERPL: 18 %
IRON SERPL-MCNC: 68 UG/DL
POTASSIUM SERPL-SCNC: 4.6 MMOL/L
PROT SERPL-MCNC: 7.4 G/DL
SODIUM SERPL-SCNC: 136 MMOL/L
TIBC SERPL-MCNC: 380 UG/DL
UIBC SERPL-MCNC: 312 UG/DL

## 2024-08-16 LAB
ALBUMIN MFR SERPL ELPH: 55.8 %
ALBUMIN SERPL-MCNC: 4.4 G/DL
ALBUMIN/GLOB SERPL: 1.3 RATIO
ALPHA1 GLOB MFR SERPL ELPH: 4.4 %
ALPHA1 GLOB SERPL ELPH-MCNC: 0.3 G/DL
ALPHA2 GLOB MFR SERPL ELPH: 9.8 %
ALPHA2 GLOB SERPL ELPH-MCNC: 0.8 G/DL
B-GLOBULIN MFR SERPL ELPH: 17 %
B-GLOBULIN SERPL ELPH-MCNC: 1.3 G/DL
DEPRECATED KAPPA LC FREE/LAMBDA SER: 1.62 RATIO
GAMMA GLOB FLD ELPH-MCNC: 1 G/DL
GAMMA GLOB MFR SERPL ELPH: 13 %
IGA SER QL IEP: 432 MG/DL
IGG SER QL IEP: 1056 MG/DL
IGM SER QL IEP: 214 MG/DL
INTERPRETATION SERPL IEP-IMP: NORMAL
KAPPA LC CSF-MCNC: 2.81 MG/DL
KAPPA LC SERPL-MCNC: 4.55 MG/DL
M PROTEIN SPEC IFE-MCNC: NORMAL
PROT SERPL-MCNC: 7.8 G/DL
PROT SERPL-MCNC: 7.8 G/DL

## 2024-09-05 ENCOUNTER — APPOINTMENT (OUTPATIENT)
Dept: ORTHOPEDIC SURGERY | Facility: CLINIC | Age: 64
End: 2024-09-05
Payer: COMMERCIAL

## 2024-09-05 DIAGNOSIS — S82.034A NONDISPLACED TRANSVERSE FRACTURE OF RIGHT PATELLA, INITIAL ENCOUNTER FOR CLOSED FRACTURE: ICD-10-CM

## 2024-09-05 PROCEDURE — 73560 X-RAY EXAM OF KNEE 1 OR 2: CPT | Mod: RT

## 2024-09-05 PROCEDURE — 99024 POSTOP FOLLOW-UP VISIT: CPT

## 2024-09-05 NOTE — HISTORY OF PRESENT ILLNESS
[de-identified] : 63-year-old woman returns for interval follow-up nonoperatively managed minimally displaced right patella fracture sustained as a result of ground-level fall July 18, 2024.  She has been religiously utilizing her knee immobilizer.  She now is able to walk with out her cane about her house.  Does not have significant worsening symptoms of pain.  At baseline she contends with some fibromyalgia for which she takes narcotic medication.  Occasionally will take some ibuprofen for knee pain.  Past orthopedic history again is significant for total hip replacement on the right side by Dr. Bonnie Cervantes in 2016.

## 2024-09-05 NOTE — IMAGING
[de-identified] : Heather middle-age woman walks into my office great facility utilizing her knee immobilizer.  No evidence of antalgia.  She is accompanied by her .  Physical examination: Right knee: Minimal if any tenderness palpation the area of the fracture.  She can straight leg raise without the knee immobilizer and extend her knee from 30 degrees flexion to full extension without difficulty.  No undue swelling about her knee.  No effusion.  No significant joint line tenderness.  Calf soft no cords.  No geniculate lymph nodes or masses.  Radiographs: Right knee (AP, lateral): Minimally displaced right transverse patella fracture.  No change in alignment from prior radiographs with interval healing present.

## 2024-09-05 NOTE — ASSESSMENT
[FreeTextEntry1] : 63-year-old woman now just over 6 weeks nonoperative management right patella fracture healing uneventfully.  I think she is healed sufficiently that she can start to practice walking about her house without the knee immobilizer.  She certainly does not need to sleep in the knee immobilizer.  Once she can walk about her house with facility without the knee immobilizer she will start to transition to walking outside of her house without a knee immobilizer.  About that time we will get her started in some physical therapy.  She can continue with the ibuprofen for her pain relief.  Will have her check in with us in the office in 6 to 8 weeks time for repeat evaluation and radiographs of her right knee.  If she has any problems am happy to see her back sooner.

## 2024-09-26 ENCOUNTER — APPOINTMENT (OUTPATIENT)
Age: 64
End: 2024-09-26
Payer: COMMERCIAL

## 2024-09-26 ENCOUNTER — OUTPATIENT (OUTPATIENT)
Dept: OUTPATIENT SERVICES | Facility: HOSPITAL | Age: 64
LOS: 1 days | End: 2024-09-26
Payer: COMMERCIAL

## 2024-09-26 VITALS
RESPIRATION RATE: 17 BRPM | OXYGEN SATURATION: 98 % | WEIGHT: 117 LBS | HEART RATE: 79 BPM | HEIGHT: 64 IN | TEMPERATURE: 98 F | BODY MASS INDEX: 19.97 KG/M2 | SYSTOLIC BLOOD PRESSURE: 138 MMHG | DIASTOLIC BLOOD PRESSURE: 68 MMHG

## 2024-09-26 DIAGNOSIS — Z98.890 OTHER SPECIFIED POSTPROCEDURAL STATES: Chronic | ICD-10-CM

## 2024-09-26 DIAGNOSIS — D50.9 IRON DEFICIENCY ANEMIA, UNSPECIFIED: ICD-10-CM

## 2024-09-26 DIAGNOSIS — D72.9 DISORDER OF WHITE BLOOD CELLS, UNSPECIFIED: ICD-10-CM

## 2024-09-26 PROCEDURE — 99214 OFFICE O/P EST MOD 30 MIN: CPT

## 2024-09-26 PROCEDURE — G2211 COMPLEX E/M VISIT ADD ON: CPT

## 2024-09-26 NOTE — HISTORY OF PRESENT ILLNESS
[de-identified] : 62 YO woman is evaluated to clarify findings of free light chains result. Pt was evaluated by GI for elevated LFT:   FLC were sent. She has no anemia or hypercalcemia or renal failure; denies wt loss, night sweats,fatigue, bone pain [de-identified] : 9/26/24

## 2024-09-26 NOTE — ASSESSMENT
[FreeTextEntry1] : 62 yo woman with normal hg, normal GFR, normal calcium, no bone pain.  Reviewed repeated labs : no evidence of plasma cell disorder  RTC PRN

## 2024-09-26 NOTE — CONSULT LETTER
[Dear  ___] : Dear  [unfilled], [Consult Letter:] : I had the pleasure of evaluating your patient, [unfilled]. [Please see my note below.] : Please see my note below. [Sincerely,] : Sincerely, [FreeTextEntry3] : Merrill Price DO Attending Physician, Hematology/ Medical Oncology [phone] office

## 2024-09-27 DIAGNOSIS — D50.9 IRON DEFICIENCY ANEMIA, UNSPECIFIED: ICD-10-CM

## 2024-10-22 ENCOUNTER — APPOINTMENT (OUTPATIENT)
Dept: ORTHOPEDIC SURGERY | Facility: CLINIC | Age: 64
End: 2024-10-22

## 2024-10-22 DIAGNOSIS — S82.034A NONDISPLACED TRANSVERSE FRACTURE OF RIGHT PATELLA, INITIAL ENCOUNTER FOR CLOSED FRACTURE: ICD-10-CM

## 2024-10-22 PROCEDURE — 99213 OFFICE O/P EST LOW 20 MIN: CPT | Mod: 24

## 2024-10-22 PROCEDURE — 73560 X-RAY EXAM OF KNEE 1 OR 2: CPT | Mod: RT

## 2025-01-06 ENCOUNTER — RX RENEWAL (OUTPATIENT)
Age: 65
End: 2025-01-06

## 2025-01-17 ENCOUNTER — APPOINTMENT (OUTPATIENT)
Dept: ORTHOPEDIC SURGERY | Facility: CLINIC | Age: 65
End: 2025-01-17
Payer: COMMERCIAL

## 2025-01-17 DIAGNOSIS — S82.034A NONDISPLACED TRANSVERSE FRACTURE OF RIGHT PATELLA, INITIAL ENCOUNTER FOR CLOSED FRACTURE: ICD-10-CM

## 2025-01-17 PROCEDURE — 73560 X-RAY EXAM OF KNEE 1 OR 2: CPT | Mod: RT

## 2025-01-17 PROCEDURE — 99213 OFFICE O/P EST LOW 20 MIN: CPT

## 2025-01-22 NOTE — ED ADULT NURSE NOTE - CAS EDN DISCHARGE ASSESSMENT
I agree with nursing assessment of the patient’s need for restraints to protect the patient from injury and facilitate healing. The patient is unable to cooperate with the plan of care and at risk for disrupting critical therapy (i.e., removing medical devices, lines, tubes and/or dressings).  Please see order for specifics.    Restraints can be removed when the patient is able to cooperate with plan of care and allow healing to occur, or the medical devices at risk are discontinued by the medical team.     Alert and oriented to person, place and time

## 2025-03-03 ENCOUNTER — APPOINTMENT (OUTPATIENT)
Dept: GASTROENTEROLOGY | Facility: CLINIC | Age: 65
End: 2025-03-03
Payer: COMMERCIAL

## 2025-03-03 VITALS
DIASTOLIC BLOOD PRESSURE: 78 MMHG | SYSTOLIC BLOOD PRESSURE: 151 MMHG | WEIGHT: 122 LBS | HEIGHT: 64 IN | HEART RATE: 70 BPM | BODY MASS INDEX: 20.83 KG/M2

## 2025-03-03 DIAGNOSIS — K83.8 OTHER SPECIFIED DISEASES OF BILIARY TRACT: ICD-10-CM

## 2025-03-03 DIAGNOSIS — D50.9 IRON DEFICIENCY ANEMIA, UNSPECIFIED: ICD-10-CM

## 2025-03-03 DIAGNOSIS — R74.8 ABNORMAL LEVELS OF OTHER SERUM ENZYMES: ICD-10-CM

## 2025-03-03 PROCEDURE — 99214 OFFICE O/P EST MOD 30 MIN: CPT

## 2025-03-03 RX ORDER — LISINOPRIL 10 MG/1
10 TABLET ORAL
Refills: 0 | Status: ACTIVE | COMMUNITY

## 2025-03-03 RX ORDER — LISINOPRIL 20 MG/1
20 TABLET ORAL
Refills: 0 | Status: ACTIVE | COMMUNITY

## 2025-03-03 RX ORDER — BISACODYL 5 MG/1
5 TABLET ORAL
Qty: 4 | Refills: 0 | Status: ACTIVE | COMMUNITY
Start: 2025-03-03 | End: 1900-01-01

## 2025-03-03 RX ORDER — POLYETHYLENE GLYCOL 3350 AND ELECTROLYTES WITH LEMON FLAVOR 236; 22.74; 6.74; 5.86; 2.97 G/4L; G/4L; G/4L; G/4L; G/4L
236 POWDER, FOR SOLUTION ORAL
Qty: 1 | Refills: 0 | Status: ACTIVE | COMMUNITY
Start: 2025-03-03 | End: 1900-01-01

## 2025-03-08 ENCOUNTER — NON-APPOINTMENT (OUTPATIENT)
Age: 65
End: 2025-03-08

## 2025-03-08 ENCOUNTER — OUTPATIENT (OUTPATIENT)
Dept: OUTPATIENT SERVICES | Facility: HOSPITAL | Age: 65
LOS: 1 days | End: 2025-03-08
Payer: COMMERCIAL

## 2025-03-08 DIAGNOSIS — R05.9 COUGH, UNSPECIFIED: ICD-10-CM

## 2025-03-08 DIAGNOSIS — Z98.890 OTHER SPECIFIED POSTPROCEDURAL STATES: Chronic | ICD-10-CM

## 2025-03-08 PROCEDURE — 71046 X-RAY EXAM CHEST 2 VIEWS: CPT | Mod: 26

## 2025-03-08 PROCEDURE — 71046 X-RAY EXAM CHEST 2 VIEWS: CPT

## 2025-03-09 DIAGNOSIS — R05.9 COUGH, UNSPECIFIED: ICD-10-CM

## 2025-04-12 ENCOUNTER — OUTPATIENT (OUTPATIENT)
Dept: OUTPATIENT SERVICES | Facility: HOSPITAL | Age: 65
LOS: 1 days | End: 2025-04-12
Payer: COMMERCIAL

## 2025-04-12 ENCOUNTER — RESULT REVIEW (OUTPATIENT)
Age: 65
End: 2025-04-12

## 2025-04-12 DIAGNOSIS — R74.8 ABNORMAL LEVELS OF OTHER SERUM ENZYMES: ICD-10-CM

## 2025-04-12 DIAGNOSIS — Z98.890 OTHER SPECIFIED POSTPROCEDURAL STATES: Chronic | ICD-10-CM

## 2025-04-12 DIAGNOSIS — Z00.8 ENCOUNTER FOR OTHER GENERAL EXAMINATION: ICD-10-CM

## 2025-04-12 PROCEDURE — 76700 US EXAM ABDOM COMPLETE: CPT | Mod: 26

## 2025-04-12 PROCEDURE — 76700 US EXAM ABDOM COMPLETE: CPT

## 2025-04-13 DIAGNOSIS — R74.8 ABNORMAL LEVELS OF OTHER SERUM ENZYMES: ICD-10-CM

## 2025-04-29 ENCOUNTER — TRANSCRIPTION ENCOUNTER (OUTPATIENT)
Age: 65
End: 2025-04-29

## 2025-04-29 ENCOUNTER — OUTPATIENT (OUTPATIENT)
Dept: OUTPATIENT SERVICES | Facility: HOSPITAL | Age: 65
LOS: 1 days | Discharge: HOME | End: 2025-04-29
Payer: COMMERCIAL

## 2025-04-29 VITALS — SYSTOLIC BLOOD PRESSURE: 132 MMHG | HEART RATE: 66 BPM | DIASTOLIC BLOOD PRESSURE: 75 MMHG

## 2025-04-29 VITALS
OXYGEN SATURATION: 94 % | RESPIRATION RATE: 13 BRPM | DIASTOLIC BLOOD PRESSURE: 58 MMHG | HEIGHT: 64 IN | HEART RATE: 65 BPM | WEIGHT: 125 LBS | SYSTOLIC BLOOD PRESSURE: 122 MMHG | TEMPERATURE: 98 F

## 2025-04-29 DIAGNOSIS — Z98.890 OTHER SPECIFIED POSTPROCEDURAL STATES: Chronic | ICD-10-CM

## 2025-04-29 DIAGNOSIS — D50.9 IRON DEFICIENCY ANEMIA, UNSPECIFIED: ICD-10-CM

## 2025-04-29 DIAGNOSIS — K22.70 BARRETT'S ESOPHAGUS WITHOUT DYSPLASIA: ICD-10-CM

## 2025-04-29 PROCEDURE — 43235 EGD DIAGNOSTIC BRUSH WASH: CPT

## 2025-04-29 NOTE — ASU DISCHARGE PLAN (ADULT/PEDIATRIC) - NS MD DC FALL RISK RISK
For information on Fall & Injury Prevention, visit: [email or web address] OR  [email or web address] OR  https://www.cdc.gov/steadi/patient.html

## 2025-04-29 NOTE — ASU PATIENT PROFILE, ADULT - NSICDXPASTSURGICALHX_GEN_ALL_CORE_FT
PAST SURGICAL HISTORY:  History of surgery RIGHT HIP REPLACEMENT    History of surgery BACK SURGERY     PAST SURGICAL HISTORY:  History of colonoscopy     History of surgery RIGHT HIP REPLACEMENT    History of surgery BACK SURGERY     PAST SURGICAL HISTORY:  History of colonoscopy     History of surgery RIGHT HIP REPLACEMENT    History of surgery BACK SURGERY NO HARDWARE

## 2025-04-29 NOTE — ASU DISCHARGE PLAN (ADULT/PEDIATRIC) - FINANCIAL ASSISTANCE
[facility] provides services at a reduced cost to those who are determined to be eligible through [facility]’s financial assistance program. Information regarding [facility]’s financial assistance program can be found by going to [email or web address] or by calling [phone].

## 2025-04-29 NOTE — CHART NOTE - NSCHARTNOTEFT_GEN_A_CORE
PACU ANESTHESIA ADMISSION NOTE      Procedure: attempted EGD  Post op diagnosis:  food particle in stomach    ____  Intubated  TV:______       Rate: ______      FiO2: ______    _x___  Patent Airway    _x___  Full return of protective reflexes    _x___  Full recovery from anesthesia / back to baseline status    Vitals:    see anesthesia record    Mental Status:  ____ Awake   _____ Alert   ___x__ Drowsy   _____ Sedated    Nausea/Vomiting:  _x___  NO       ______Yes,   See Post - Op Orders         Pain Scale (0-10):  _____    Treatment: ____ None    __x__ See Post - Op/PCA Orders    Post - Operative Fluids:   ____ Oral   ___x_ See Post - Op Orders    Plan: Discharge:   ___x_Home       _____Floor     _____Critical Care    _____  Other:_________________    Comments:  No anesthesia issues or complications noted.  Discharge when criteria met.attempted EGd, aborted for food particle in stomach

## 2025-04-29 NOTE — ASU DISCHARGE PLAN (ADULT/PEDIATRIC) - CALL YOUR DOCTOR IF YOU HAVE ANY OF THE FOLLOWING:
Patient with obvious aspiration, decision made to intubate patient, requiring repeated suction before being able to attempt intubation.   Cords visualized 7.0 ET tube used, 24 cm at the lip, color change with CO2 detector bilateral breath sounds appreciated Bleeding that does not stop/Pain not relieved by Medications

## 2025-04-29 NOTE — ASU PATIENT PROFILE, ADULT - AS SC BRADEN FRICTION
Patient called , she said her whole body hurts , she doesnt want to come in for an appt , she said \" I know she will tell me to calm down\".   She said will go to ER
(3) no apparent problem

## 2025-04-29 NOTE — ASU PREOP CHECKLIST - NSWEIGHTCALCTOOLDRUG_GEN_A_CORE
Called David's mother, Osbaldo, regarding the previously scheduled appointment  on 08/30/2021.    Was the patient referred to be seen by another physician?  yes - Kash Tan MD     Reason for visit:  (asthma) \"David has had breathing problems since he was a baby - he had RSV. He coughs a lot and gets short of breath.\"    Visit Type:  an office visit    Does the patient have outside records which    no  will need to be reviewed?   Location & Name of Clinic:  Dates of Care:     Does the above person understand   the nature and duration of the appointment?    yes    Has the patient been seen in this Allergy Clinic before?   no    If food/venom/medication allergy concern, did the past   exposure result in a systemic reaction?    n/a  If yes, appointment must be >6 weeks following reaction.    If food allergy concern, is there a known trigger food(s)?  n/a  If yes, advise to bring food with to the appointment.    Does the patient take any antihistamines?    no  If yes, was the patient/parent/guardian instructed    to stop antihistamines for three days prior to   the appointment?       n/a    Does the patient take any beta blocker medications?  no  If yes, was the patient/parent/guardian instructed to  stop them for the day of the appointment?     n/a    Is the patient pregnant?      n/a    Does the patient have asthma?     yes  If yes, was the patient/parent/guardian instructed  to stay on all inhalers, asthma medications,   montelukast and/or prednisone?      yes    Does the patient have any dogs?     yes  If yes, was the patient advised on the following   dander collection technique?      yes  · Brush/comb dog hair ensuring close contact with the skin  · Do not cut dog hair   · Place dog hair into Ziploc bag and label with name    · If multiple dogs, use separate Ziploc bags and brush/preston for each dog      used

## 2025-04-29 NOTE — ASU PATIENT PROFILE, ADULT - NSICDXPASTMEDICALHX_GEN_ALL_CORE_FT
PAST MEDICAL HISTORY:  Depression, unspecified depression type     Eczema, unspecified type     Fibromyalgia     HTN (hypertension)     Hypothyroid      PAST MEDICAL HISTORY:  Depression, unspecified depression type     Eczema, unspecified type     Fibromyalgia     GERD (gastroesophageal reflux disease)     HTN (hypertension)     Hypothyroid

## 2025-05-02 DIAGNOSIS — E03.9 HYPOTHYROIDISM, UNSPECIFIED: ICD-10-CM

## 2025-05-02 DIAGNOSIS — M79.7 FIBROMYALGIA: ICD-10-CM

## 2025-05-02 DIAGNOSIS — K21.9 GASTRO-ESOPHAGEAL REFLUX DISEASE WITHOUT ESOPHAGITIS: ICD-10-CM

## 2025-05-02 DIAGNOSIS — I10 ESSENTIAL (PRIMARY) HYPERTENSION: ICD-10-CM

## 2025-05-02 DIAGNOSIS — D50.9 IRON DEFICIENCY ANEMIA, UNSPECIFIED: ICD-10-CM

## 2025-05-02 DIAGNOSIS — Z53.8 PROCEDURE AND TREATMENT NOT CARRIED OUT FOR OTHER REASONS: ICD-10-CM

## 2025-05-02 DIAGNOSIS — F32.A DEPRESSION, UNSPECIFIED: ICD-10-CM

## 2025-05-07 ENCOUNTER — RX RENEWAL (OUTPATIENT)
Age: 65
End: 2025-05-07

## 2025-06-06 ENCOUNTER — APPOINTMENT (OUTPATIENT)
Dept: GASTROENTEROLOGY | Facility: CLINIC | Age: 65
End: 2025-06-06

## 2025-06-06 VITALS — HEIGHT: 64 IN | BODY MASS INDEX: 20.14 KG/M2 | WEIGHT: 118 LBS

## 2025-06-06 PROCEDURE — 99214 OFFICE O/P EST MOD 30 MIN: CPT

## 2025-06-06 RX ORDER — LISINOPRIL 30 MG/1
TABLET ORAL
Refills: 0 | Status: ACTIVE | COMMUNITY

## 2025-06-06 RX ORDER — POLYETHYLENE GLYCOL 3350 17 G/17G
17 POWDER, FOR SOLUTION ORAL
Qty: 1 | Refills: 0 | Status: ACTIVE | COMMUNITY
Start: 2025-06-06 | End: 1900-01-01

## 2025-06-06 RX ORDER — BISACODYL 5 MG/1
5 TABLET ORAL
Qty: 4 | Refills: 0 | Status: ACTIVE | COMMUNITY
Start: 2025-06-06 | End: 1900-01-01

## 2025-06-24 ENCOUNTER — APPOINTMENT (OUTPATIENT)
Dept: OBGYN | Facility: CLINIC | Age: 65
End: 2025-06-24
Payer: COMMERCIAL

## 2025-06-24 VITALS
SYSTOLIC BLOOD PRESSURE: 106 MMHG | DIASTOLIC BLOOD PRESSURE: 63 MMHG | HEIGHT: 64 IN | BODY MASS INDEX: 20.32 KG/M2 | WEIGHT: 119 LBS

## 2025-06-24 PROBLEM — K21.9 GASTRO-ESOPHAGEAL REFLUX DISEASE WITHOUT ESOPHAGITIS: Chronic | Status: ACTIVE | Noted: 2025-04-29

## 2025-06-24 PROCEDURE — 99396 PREV VISIT EST AGE 40-64: CPT

## 2025-06-27 LAB — HPV HIGH+LOW RISK DNA PNL CVX: NOT DETECTED

## 2025-07-24 ENCOUNTER — TRANSCRIPTION ENCOUNTER (OUTPATIENT)
Age: 65
End: 2025-07-24

## 2025-07-24 ENCOUNTER — RESULT REVIEW (OUTPATIENT)
Age: 65
End: 2025-07-24

## 2025-07-24 ENCOUNTER — OUTPATIENT (OUTPATIENT)
Dept: OUTPATIENT SERVICES | Facility: HOSPITAL | Age: 65
LOS: 1 days | Discharge: HOME | End: 2025-07-24
Payer: COMMERCIAL

## 2025-07-24 VITALS
WEIGHT: 119.93 LBS | TEMPERATURE: 98 F | RESPIRATION RATE: 18 BRPM | OXYGEN SATURATION: 97 % | DIASTOLIC BLOOD PRESSURE: 71 MMHG | HEART RATE: 68 BPM | SYSTOLIC BLOOD PRESSURE: 138 MMHG | HEIGHT: 64 IN

## 2025-07-24 VITALS
RESPIRATION RATE: 18 BRPM | OXYGEN SATURATION: 97 % | DIASTOLIC BLOOD PRESSURE: 62 MMHG | HEART RATE: 78 BPM | SYSTOLIC BLOOD PRESSURE: 125 MMHG

## 2025-07-24 DIAGNOSIS — Z98.890 OTHER SPECIFIED POSTPROCEDURAL STATES: Chronic | ICD-10-CM

## 2025-07-24 DIAGNOSIS — K64.9 UNSPECIFIED HEMORRHOIDS: ICD-10-CM

## 2025-07-24 DIAGNOSIS — K62.5 HEMORRHAGE OF ANUS AND RECTUM: ICD-10-CM

## 2025-07-24 PROCEDURE — 43239 EGD BIOPSY SINGLE/MULTIPLE: CPT

## 2025-07-24 PROCEDURE — 88312 SPECIAL STAINS GROUP 1: CPT

## 2025-07-24 PROCEDURE — 88305 TISSUE EXAM BY PATHOLOGIST: CPT

## 2025-07-24 PROCEDURE — 88305 TISSUE EXAM BY PATHOLOGIST: CPT | Mod: 26

## 2025-07-24 PROCEDURE — 88312 SPECIAL STAINS GROUP 1: CPT | Mod: 26

## 2025-07-24 PROCEDURE — 45378 DIAGNOSTIC COLONOSCOPY: CPT

## 2025-07-24 RX ORDER — LORAZEPAM 4 MG/ML
1 VIAL (ML) INJECTION
Refills: 0 | DISCHARGE

## 2025-07-24 NOTE — ASU DISCHARGE PLAN (ADULT/PEDIATRIC) - PROVIDER TOKENS
Problem:  Care  Goal: [place] assessment and vital signs within acceptable range  Outcome: Outcome Met, Continue evaluating goal progress toward completion  Goal: [place] has at least two successful feeding interactions  Outcome: Outcome Met, Continue evaluating goal progress toward completion  Goal: Infant does not have pain / discomfort per NIPS Scale  Outcome: Outcome Met, Continue evaluating goal progress toward completion      PROVIDER:[TOKEN:[[id number]],ESTABLISHEDPATIENT:[T]]

## 2025-07-24 NOTE — ASU DISCHARGE PLAN (ADULT/PEDIATRIC) - FINANCIAL ASSISTANCE
[facility] provides services at a reduced cost to those who are determined to be eligible through [facility]’s financial assistance program. Information regarding [facility]’s financial assistance program can be found by going to [email or web address] or by calling [phone].

## 2025-07-24 NOTE — ASU DISCHARGE PLAN (ADULT/PEDIATRIC) - PAIN MANAGEMENT
Detail Level: Detailed Take over the counter pain medication Moisturizer Recommendations: Cerave, cetaphil, aquaphor, aveeno, epsom salt

## 2025-07-24 NOTE — H&P PST ADULT - HEMATOLOGY/LYMPHATICS
Weight Units: pounds Nosebleeds Normal Treatment: I explained this is common when taking isotretinoin. I recommended saline mist in each nostril multiple times a day. If this worsens they will contact us. Any Headache: No Retinoid Dermatitis Normal Treatment: I recommended more frequent application of Cetaphil or CeraVe to the areas of dermatitis. Cheilitis Aggressive Treatment: I recommended application of Vaseline or Aquaphor numerous times a day (as often as every hour) and before going to bed. I also prescribed a topical steroid for twice daily use. Female Completion Statement: After discussing her treatment course we decided to discontinue isotretinoin therapy at this time. I explained that she would need to continue her birth control methods for at least one month after the last dosage. She should also get a pregnancy test one month after the last dose. She shouldn't donate blood for one month after the last dose. She should call with any new symptoms of depression. Counseling Text: I reviewed the side effect in detail. Patient should get monthly blood tests, not donate blood, not drive at night if vision affected, and not share medication. Retinoid Dermatitis Aggressive Treatment: I recommended more frequent application of Cetaphil or CeraVe to the areas of dermatitis. I also prescribed a topical steroid for twice daily use until the dermatitis resolves. Myalgia Monitoring: I explained this is common when taking isotretinoin. If this worsens they will contact us. Cheilitis Normal Treatment: I recommended application of Vaseline or Aquaphor numerous times a day (as often as every hour) and before going to bed. negative Next Month's Dosage: 40mg BID Dosing Month 1 (Required For Cumulative Dosing): 40mg Daily Headache Monitoring: I recommended monitoring the headaches for now. There is no evidence of increased intracranial pressure. They were instructed to call if the headaches are worsening. Xerosis Normal Treatment: I recommended application of Cetaphil or CeraVe numerous times a day going to bed to all dry areas. Myalgia Treatment: I explained this is common when taking isotretinoin. If this worsens they will contact us. They may try OTC ibuprofen. Pounds Preamble Statement (Weight Entered In Details Tab): Reported Weight in pounds:5 lbs gained Hypertriglyceridemia Monitoring: I explained this is common when taking isotretinoin. We will monitor closely. Comments: Pt is currently on or is finishing month #: 1 Display Individual Monthly Dosage In The Note (If Yes Will Display All Dosages Which Are Not N/A): yes Male Completion Statement: After discussing his treatment course we decided to discontinue isotretinoin therapy at this time. He shouldn't donate blood for one month after the last dose. He should call with any new symptoms of depression. Detail Level: Detailed Kilograms Preamble Statement (Weight Entered In Details Tab): Reported Weight in kilograms: Use Therapeutic Ranged Or Therapeutic Target: please select Range or Target Xerosis Aggressive Treatment: I recommended application of Cetaphil or CeraVe numerous times a day going to bed to all dry areas. I also prescribed a topical steroid for twice daily use. Lower Range (In Mg/Kg): 120 Xerosis Aggressive Treatment: I recommended application of Cetaphil or CeraVe numerous times a day and before going to bed to all dry areas. I also prescribed a topical steroid for twice daily use. What Is The Patient's Gender: Male Is Xerosis Present?: Yes - Normal Treatment Are Labs Available For Review?: No- Labs Deferred This Month Target Cumulative Dosage (In Mg/Kg): 135 Female Pregnancy Counseling Text: Female patients should also be on two forms of birth control while taking this medication and for one month after their last dose. Xerosis Normal Treatment: I recommended application of Cetaphil or CeraVe numerous times a day and before going to bed to all dry areas. Upper Range (In Mg/Kg): 150 Months Of Therapy Completed: 1 Nosebleeds Normal Treatment: I explained this is common when taking isotretinoin. I recommended aquaphor in each nostril multiple times a day. If this worsens they will contact us.

## 2025-07-24 NOTE — ASU DISCHARGE PLAN (ADULT/PEDIATRIC) - CARE PROVIDER_API CALL
Nellie Sauceda ()  Gastroenterology  [address] [facility] [place] [address]  Phone: [phone]  Fax: [phone]  Established Patient  Follow Up Time:

## 2025-07-24 NOTE — H&P PST ADULT - HISTORY OF PRESENT ILLNESS
64yo female with HTN, Hypothyroidism, Fibromyalgia, ?Perez's Esophagus presents for follow up  ?  #Iron Deficiency Anemia  #H/O Perez's Esophagus  #CRC Screening  s/p EGD 7/19 showed no H Pylori, IM, or dysplasia. Recent EGD showing reactive gastropathy, no evidence of H pylori. Colonoscopy 7/2024 with inadequate prep  -Recommend reschedule EGD and colonoscopy  -Reiterated diet/prep instructions, low residue diet 5 days prior to procedure  -Discussed alternate colonoscopy preparations, pt opted for miralax  -Clear liquid diet 2 days prior to procedure  -Consider GE study  -Continue protonix 40mg daily taken 20-30 minutes before breakfast  -Antireflux measures discussed including elevating hob and to avoid lying down for 2-3 hours after meals  -Discussed avoidance of dietary triggers including spicy foods, coffee, etoh, citrus, tomatoes, chocolate, mints  -Continue NSAID avoidance  ?  ?  #Elevated LFTs - normalized  #Biliary dilation  #Gallstones  s/p EGD/EUS/ERCP on 6/14/24 revealing dilated CBD 11mm with biliary sludge s/p biliary sphincterotomy, sludge removal otherwise unremarkable.  Likely component of narcotics contributing to biliary dilation vs passed stone/sludge. LFTs normalized. No other clear etiology, AI, hep panel negative, no new drugs or herbal supplements  -Repeat LFTs as previously requested  -Continue to avoid nonessential hepatotoxic medications  -Low fat diet  -Surgery referral if develops s/s biliary colic/symptomatic gallstone disease

## 2025-07-24 NOTE — ASU PATIENT PROFILE, ADULT - NSICDXPASTMEDICALHX_GEN_ALL_CORE_FT
PAST MEDICAL HISTORY:  Depression, unspecified depression type     Eczema, unspecified type     Fibromyalgia     GERD (gastroesophageal reflux disease)     HTN (hypertension)     Hypothyroid

## 2025-07-24 NOTE — ASU PREOP CHECKLIST - VIA
Behavioral Health Adult Initial Assessment      Shanika Erazo  MRN:  [id number]    Date:  5/25/2018     [address]  [facility] [address]    Referred by:  []  Self  [x]  PCP []  Other      Others Present:  None    Relationship Status:   []  Single     [x]   (How long? - 5y) []  Remarried (How long?)  []   (How long?)   []   (How long?)  [x]   (1 # of times)    []  Unmarried Couple    Check the box or boxes that best describes patient's reason for seeking treatment:   []  Family  []  School  [x]  Marital  []  Alcohol  []  Drug  [x]  Behavior  []  Trauma/Abuse  []  Self-harm  []  Anger  []  Work  [x]  Problems with mood  []  Legal issues   []  Grief/loss  []  Eating disorder  []  Health related problems   [x]  Major life changes such as a move, death, employment/relationship changes: 's affair.   []  Childbirth/Adoption  [x]  Other (please describe): marital  []  Currently has prescription for controlled substances:      Chief Complaint in Patient's Own Words:  I am here because my  had an affair and he promised that he would break up. I feel like I am suffering an injustice. I feel anxious and I feel depressed and I feel upset. I recently found out that he had an affair, he was saying that he is thinking to stay together. I said I was done. He is begging me to stay now. He said he felt that I was neglecting him when I was in my school last year. He said he needed more attention. He was  before and he had an affair. I feel he is serous now, but he is really emotionally soft. The woman is very manipulative. We do not have children. It is my second marriage. Our relationship is amazing. I do not want to be a door barak, I have dignity. I am seeing other gentlemen now. I want to fairness and honesty. I told him it was in his hands. It is his choice.      Brief History of Presenting Problem(s):  I have been on medication for CASIMIRO and depression in my 20ies. My  father committed suicide. He killed himself when I was 16 y/o. I remeber even before that I was sad kid. My father was an alcoholic. I was brought up by grandparents and auntie. My mother then met a women and  to her. We moved to them. Father blamed my mother for that, his suicide.      Onset:  See above     Precipitating Events:  See above      Frequency/Duration of Symptoms:  (Number indicates severity on the scale 0 -10, with 10 being the highest, most intense)    Yes Symptoms Frequency/Duration   [x] Sad/Down  7   [x]  Crying 7   [x] Muscle Tension 5   [x] Hopeless 1   [x] Helpless 2   [x] Feelings of Worthlessness 1   [x] Social Withdrawal 2   [x] Irritability 3   [x] Mood Swings 7   [x]  Yolie 0   [x] Restlessness/Difficulty Relaxing 5   [x] Early AM Awakening  5   [x] Marital Problems 10   [x] Substance Abuse/AA AA 7   [x] Nightmares 0   [x] Hallucinations/Delusions 0   [x] Paranoia  0   [x] Libido Disruption  0   [x] Problematic Spending/Shopping 0   [x] Anxiety/Excessive Worry 3   [x] Panic Attacks 0   [x] Obsessions/Compulsions 0   [x] Loss of Interest in Usual Activities 5   [x] Self Abuse/Cutting/Burning 0   [x] Easily Distracted 7   [x] Inattention 7   [x] Lack of organization  6   [x]  Other NA     Energy:  [x]  Good  [x]  Fair  []  Poor  Concentration:  []  Good  [x]  Fair  []  Poor  Appetite:  []  No Changes []  Increase      []  Weight Gain Amount :  []  Fluctuate    []  Decrease    [x]  Weight Loss Amount:   []  Fluctuate   Eating Problems:  [x]  None  []  Binging     []  Restricting   []  Purging Behaviors    []  Over-eating  []  Emotional Eating  Further Eating Disorder assessment needed?:  Yes []    No  [x]    If yes, referred to whom?     Check the box which best describes patient's general happiness and well being:    []  Excellent    [x]  Good    [x]  Fair     []  Poor     []   Very Poor     Current living situation:   []  House  []  Apartment   []  Group Home  []  Nursing Home  []   Own  []  Rent  []  Other:     FAMILY MEMBERS:    Name     Age     Occupation/  School   Relationship   To Patient   Lives With     [x]  Children:  0, lives w/ .     Family Psychiatric History Diagnosis/Medications AODA   Mother:       [x]  Yes  []  No Anxiety  [x]  Yes  []  No   Father:        [x]  Yes  []  No Depression, AA, suicide  [x]  Yes  []  No   Siblings:      [x]  Yes  []  No Some CASIMIRO []  Yes  []  No   Extended Family  []  Yes []  No TBD []  Yes  []  No     Parents :   [] Yes   [x]     []  Never      []     [x]  Father    []  Mother        EDUCATIONAL HISTORY:  PhD NURSING     LEARNING DIFFICULTIES:   [] Yes (If yes, explain)              [x] No     SEXUAL ORIENTATION:  []  Heterosexual   []  Leo/Lesbian   [x]  Bi-sexual   []  Other:     SOCIAL ACTIVITIES:  Describe exercise, leisure, recreational activities, hobbies, other interests:  Reading, gardening, writing     EMPLOYMENT STATUS:  [x]  Employed  []  Self-Employed  [] Unemployed    []  Retired    []  In School (where):   []  Applied for Disability  []  Disability  []  Other:      PRESENT EMPLOYMENT:  Place of Employment:  [employer]   Position/How Long:  [employer] 10 - 1 y    PRIOR EMPLOYMENT HISTORY:   [x]  No problem  []  Laid off  []  Disciplinary Action    []  Employee/Employer Conflicts   []  Absenteeism    []  Alcohol/Drug Related Problems    JOB SATISFACTION:  [x]  Yes   []  No      If no, describe:      SERVICE:  []  Yes   [x]  No        Deployment:   []  Yes  [x]   No    Honorable Discharge:   []  Yes   [x]  No  If no to honorable discharge, describe:     FINANCIAL PROBLEMS:   [x]  None    []  Frequent Loans  []  Student Loans (amount) []  Inability to Pay Loans     []  Poor Credit  []  Income Assistance  []  Mounting Bills   []  Gambling   []  Loss of Property  []  Bankruptcy     LEGAL PROBLEMS:  [x]  None  []  Operating While Intoxicated   []  Driving Under Influence   []  Emergency  MCC  []  Court Stipulation  []  Protective Custody    []  Charges Pending   []  Pending Court Date (when?)  []  On Probation/Marvel (when?)   []  Probation/Marvel Office/Telephone Number  []  Professional License Revocation     []  Arrests:  Please list:   []  History of Incarceration       []  Divorce/Custody Proceeding    SPIRITUALITY:  Is spirituality part of patient's belief system?   [x] Yes    [] No     []  Unsure  If yes, how does spiritual belief help?  SBNR    Does patient have a Gnosticism preference?  Yes  [x]    No  []    If yes, describe:     Does patient have any spiritual concerns to be addressed in therapy?   Yes  []    No  [x]      Does patient have any spiritual expectations, values, or pressures causing conflict in their life? Yes  []    No  [x]    If yes, describe:     CULTURAL:  Does patient have any cultural/ethnic expectations, values, or pressures causing conflict in their life?  Yes  []    No  [x]    If yes, describe:     Check which best describes patient's current health:  []   Excellent    [x]  Good   [x]  Fair    []  Poor    []  Very Poor      CURRENT AND PAST MEDICAL HISTORY:  Check if patient is experiencing or has ever experienced:    []  Abnormal blood pressure    []  HIV/Positive/AIDS/ARC  [x]  Acne     []  Irritable Bowel Syndrome  []  Anemia     []  Kidney or Bladder Problems    []  Arthritis/Rheumatism   []  Liver Disease  []  Asthma     []  Memory Loss  []  Broken Bones    []  Neurological Disorders  []  Cancer     []  Rheumatic Fever  []  Changes in Appetite   []  Sickle Cell Disease  []  Chronic Fatigue Syndrome  []  Skin Disorders  []  Diabetes                [x]  Sleep Disorders  []  Eating Disorders    []  Ulcer/Abdominal Pain  []  Emphysema    []  Tuberculosis  []  Epilepsy/Seizure    []  Tumors  []  Fainting Spells    []  Stroke  []  Fibromyalgia    []  STD  []  Glaucoma/Cataracts   [x]  Visual Problems   []  Hay Fever     [x]  Weight Change  []  Head Injury                []  Hearing Impaired  []  Cirrhosis     []  Heart (Disease/Surgery)  []  Hepatitis-Type A, B, or C              []  Heart Murmur  []  Thyroid Problems                                     [x]  BMI:  26  [x]  Tobacco Abuse                                     [x]  Migraines/headaches    []  Heart Pacemaker                                      []  Other:     Is patient currently experiencing any ACUTE OR CHRONIC PAIN?    Yes  [] (if yes, explain)    No  [x]    If yes, is referral needed?:  Yes  []    No  [x]    To whom?:     SIGNIFICANT MEDICAL HISTORY:  None     TOBACCO USE:   []  Current   []  Former  [x]  Never  Is patient willing to make a Quit Attempt?   []  Yes   []  No   [] Maybe  If yes, provided [place] Tobacco Quit Line ([phone]) to patient?  []  Yes    []  No    COFFEE/CAFFEINE  USE:  [x]  Current   []  Former  []  Never (number of cups a day): 2     DOES PATIENT MONTILLA?  Yes  []    No  [x]    If yes:  [] Have you ever felt the need to bet more and more money?   [] Have you ever had to lie to people important to you about how much you montilla?   Further gambling assessment needed?:  Yes  []    No  []    If yes, referred to whom?:     DOES PATIENT DRINK ALCOHOL?  Yes  []    No  [x]      Drinks socially  []  Drinks alone  []      If YES, answer the following questions:    A score of 8+ on the AUDIT generally indicates harmful or hazardous drinking-AODA Consult.  Questions 1-8=0, 1, 2, 3, or 4 points.  Questions 9 and 10 are scored 0, 2, or 4 only.   Never    (0) Monthly or less    (1) 2-4 times a month    (2) 2-3 times a week    (3) 4 or more times a week    (4)   1. How often do you have a drink containing alcohol?     x       1 or 2    (0) 3 or 4    (1) 5 or 6    (2) 7 to 9    (3) 10 or more    (4)   2. How many drinks containing alcohol do you have in a typical day when you are drinking?  Number of standard drinks: 12 ounces of beer, 5 ounces wine, 1-1.5 ounces of liquor:   x          Never    (0) Less Than Monthly    (1) Monthly    (2) 2-3 times per week    (3) 4 or more times a week    (4)   3. How often do you have six (6) or more drinks on one occasion?   x     4. How often during the last year have you found that you were not able to stop drinking once you had started?  x      5. How often during the last year have you failed to do what was normally expected from you because of drinking? x       6. How often during the last year have you needed a first drink in the morning to get yourself going after a heavy drinking session? x       7. How often during the last year have you had a feeling of guilt or remorse after drinking?  x      8. How often during the last year have you been unable to remember what happened the night before because you had been drinking?  x         No  (0) Yes, but not in the last year  (2) Yes, during the last year  (4)   9. Have you or someone else been injured as a result of your drinking? x     10. Has a relative or friend, or a doctor or other health worker been concerned about your drinking or suggested you cut down? x       Score:  6    Do you use drugs such as cannabis (marijuana, hash) solvents, tranquilizers, barbiturates, narcotics, cocaine, stimulants, hallucinogens, etc?  Yes  []    No  [x]   []  THC []  Opiates []  Benzo  []  Stimulants []  Other: []  Hallucinogens   []  Amphetamines   If yes, answer the following questions:    In the statements \"drug abuse\" refers to (1) the use of prescribed or over the counter drugs in excess of the directions and (2) any non-medical use of drugs.  The various classes of drugs may include:  Cannabis, (e.g., marijuana, hash), solvents, tranquilizers (e.g., Valium), barbiturates, cocaine, stimulants (e.g., speed), hallucinogens, (e.g., LSD) or narcotics (e.g., heroin).  Remember that the questions do not include alcoholic beverages.    Please answer every question.  If you have difficulty with a statement, then choose the  response that is mostly right.    Score 1 point for each \"YES\" except for Questions 4 and 5, for which a \"NO\" receives 1 point.   YES NO   1. Have you used drugs other than those required for medical reasons?     2. Have you abused prescription drugs?     3. Do you abuse more than one drug at a time?         4. Can you get through the week without using drugs?         5. Are you always able to stop using drugs when you want to?       6. Have you had “blackouts” or “flashbacks” as a result of your drug use?       7. Do you ever feel bad or guilty about your drug use?         8. Does your spouse/significant other (or parents) ever complain about your involvement with drugs?       9. Has drug abuse created problems between you and your spouse/significant other or parents?        10. Have you lost friends because of your use of drugs?        11. Have you neglected your family because of your use of drugs?          12. Have you been in trouble at work (or school) because of drug abuse?       13. Have you lost your job because of drug abuse?       14. Have you gotten into fights when under the influence of drugs?          15. Have you engaged in illegal activities in order to obtain drugs?         16. Have you been arrested for possession of illegal drugs?         17. Have you ever experienced withdrawal symptoms (felt sick) when you stopped taking drugs?         18. Have you had medical problems as a result of your drug use? (e.g., memory loss, hepatitis, convulsions, bleeding, etc.)     19. Have you gone to anyone for help for a drug problem?          20. Have you been involved in a treatment program specifically related to drug use?       Score:  na    Score Severity Intervention Recommended   0 N/A N/A   1-5 Low AODA Consult   6-10 Intermediate(Likely meets DSM Criteria) Outpatient   11-15 Substantial Higher Level of Care Consult   16-20 Severe Higher Level of Care     Further AODA assessment needed?:  Yes  []    No   [x]  If yes, referred to whom?:  na    *Drug Abuse Screening Test (DAST) was developed by Donovan Robles, PhD, 1982    PAST/PRESENT PSYCHIATRIC AND AODA TREATMENT HISTORY:  Facility Name    None  Physician Name     Date     Reason   IP OP   AODA     SUICIDE RISK ASSESSMENT  YES NO If yes, describe           x Suicide attempt in last 24 hours?    x Suicidal thoughts?    x Plan or considering various methods?  Describe:  N/A     x Access to means?  No Specify weapon location  N/A     x Indication of substance abuse/dependence?    x Attempts in past?  How many?  Date of most recent:   Method used?     x Any family members, loved ones, friends who committed suicide?   x  Recent deaths, losses, anniversary dates? Father when 16 y/o    x Has made preparations for death?    x Lack of support system?       [x] N/A Verbal contract for safety?   x  Patient has no current intent or plan, but agrees to contact provider if suicidal ideation arises.   x  Patient given emergency 24 hour access information.  Yes     SUICIDE PREVENTION PLAN IMPLEMENTED - CALL 911, THERAPIST, REACH TO CLOSE PEOPLE IN CASE OF SUICIDAL IDEATION, PLAN, INTENT, URGE:  []  Yes [x]  N/A    VIOLENCE/HOMICIDE ASSESSMENT    YES NO If yes, describe current or past violence    x Threat made to harm or kill someone?    A specific individual?     Name:     x  Access to weapon?  Where is weapon? Safe     x History of violence/aggressive behavior to others?    x History of significant damage to property?    x Indication of substance abuse/dependence?    x Witnessed violence or significant aggression?     Does patient experience anger management problems?   []  Yes   [x]  No  If yes, describe:   How does the patient cope with anger?:  talking about that     TRAUMA ASSESSMENT  YES NO If yes, describe current or past trauma    x Physically abused?     x Emotionally/Verbally abused?      x Sexually abused?         x Exposed to domestic violence, community violence  or  violence?  Specify:      x Been physically, sexually or verbally abusive to others?    X  Safety concerns for anyone in the family?     PATIENT STRENGTHS:   Patient View:  Adaptable   Provider View:  Compassion, open minded     PATIENT LIMITS:  Patient View:  Marital   Provider View:  Goals     Patient Support System:  family    Does the patient want family or others involved in treatment or education and learning?   []  Yes    [x]  No     If yes, whom?      MENTAL STATUS EXAM:  Hygiene Concerns:  []  Yes   [x]  No   Describe:    Appearance:   [x]  Unremarkable  []  Other:    Distress:  []  Acute  []  High []  Moderate   [x]  Mild    []  None  Gait:   [x]  Normal  []  Slow     []  Retarded  []  Hyper  Posture:  [x]  Normal  []  Slumped  []  Rigid    Eye Contact:  [x]  Maintained  [] Avoided [] [place] intense    Mannerisms:   [x]  Unremarkable   [] Gestures [] Grimaces  [] Twitches/Tics     []  Tremor  []  Other:   Behavior:  [x]  Normal  []  Restless  []  Compulsive  []  Tremulous     []  Lethargic  []  Uncoordinated  Mood/Feelings: [x]  Depressed  []  Irritable  [x]  Anxious  []  Fearful  []  Euphoric     []  Labile  []  Grief     []  Panic  [] Guilt []  Shame     []  Apathy/Indifference []  Helpless  []  Hopeless     []  Euthymic  []  Other:  Affect:   [x]  Normal  []  Constricted  []  Blunted      [x] Appropriate to Content   [] Inappropriate to Content  Thought Processes: [x]  Congruent  []  Incongruent  [] Loose Associations     []  Poverty of Ideas  []  Tangential    []  Incoherence     []  Blocking/thought interruption []  Paranoia  Thought Content: [x]  Normal  []  Delusions  []  Obsessions  []  Phobia:   Perceptual Problems:  [x]  None  [] Hallucinations  []  Auditory  [] Visual  [] Perceptual  Orientation:  [x]  Normal/No Impairment  [x]  Person  [x]  Place  [x]  Time  Speech:  [x]  Clear/Articulate  []  Soft  []  Loud  []  Pressured  []  Animated     []  Rambling  []  Slurred  Insight:  []   Good  [x]  Fair  []  Poor  Judgment:  []  Good  [x]  Fair  []  Poor  Recent Memory: [x]  Good  []  Fair  []  Poor  Remote Memory: [x]  Good  []  Fair  []  Poor  Attention:  [x]  Good  []  Fair  []  Poor  Level of Engagement:   [x]  Open  []  Guarded  Motivation:  []  Very motivated [x]  Moderate []  Mild []  Unmotivated []  External Locus of Control [x] Internal   [] Medication refill     Chief Complaint   Patient presents with   • Personal        DIAGNOSIS:  (Primary first, followed by secondary) The primary encounter diagnosis was Adjustment reaction with anxiety and depression. Diagnoses of Other mixed anxiety disorders, Other insomnia, and Marital conflict were also pertinent to this visit..     Refer for Psychotherapy?:     [x] Yes     Estimated Length of Treatment:  TBD  []  No    [] Assessment Only   [] Referred to:    [x] Referred for Medication Assessment:            [] Referred to PHP:             [] Referred to IOP MH:            [] Referred to IOP AODA:               [] Referred to Neuropsychological Testing:      Patient given emergency 24 hour access information?  [x]   Yes   []  No    INITIAL PROGRESS NOTE (include an integrated clinical summary):     What do you expect to achieve from therapy:  Patient:  \"I need to figure out what I need to do\".     Patient reports:  Anxiety, marital issues.   Patient presents with:  Open, communicative.     Assessment:  Writer conducted an initial interview, provided psychotherapy and obtained pertinent information from patient.  Patient is open for change, pt's insight is good and pt is able to engage in therapy and participate.  Showing signs of anxiety  and is open explores coping skills when dealing with The primary encounter diagnosis was Adjustment reaction with anxiety and depression. Diagnoses of Other mixed anxiety disorders, Other insomnia, and Marital conflict were also pertinent to this visit. and reasons for pt's feelings.     Writer provided support  and worked w/ patient on developing Intake assessment, [x]  scheduled F/U session.      Response:  Patient motivated and engaged, shared personal information and events leading to marital conflict.     Plan:  TP goals continued:  Exercise on regular basis, implementing healthy diet, read self-help literature, keep journal, take medications if/as prescribed psychiatrist, decrease of depression and anxiety by 60%.     Integrated Summary (Including Patient's Own Words):    Chief Complaint in Patient's Own Words:  I am here because my  had an affair and he promised that he would break up. I feel like I am suffering an injustice. I feel anxious and I feel depressed and I feel upset. I recently found out that he had an affair, he was saying that he is thinking to stay together. I said I was done. He is begging me to stay now. He said he felt that I was neglecting him when I was in my school last year. He said he needed more attention. He was  before and he had an affair. I feel he is serous now, but he is really emotionally soft. The woman is very manipulative. We do not have children. It is my second marriage. Our relationship is amazing. I do not want to be a door barak, I have dignity. I am seeing other gentlemen now. I want to fairness and honesty. I told him it was in his hands. It is his choice.      Brief History of Presenting Problem(s):  I have been on medication for CASIMIRO and depression in my 20ies. My father committed suicide. He killed himself when I was 16 y/o. I remeber even before that I was sad kid. My father was an alcoholic. I was brought up by grandparents and auntie. My mother then met a women and  to her. We moved to them. Father blamed my mother for that, his suicide.      Medical History:    Patient Active Problem List   Diagnosis   • Depression   • Anxiety   • Low back pain   • External hemorrhoid   • Other acne   • Family history of breast cancer in first degree relative   • At  high risk for breast cancer     Psychiatric History:  None  AODA History:  Denies  Psychological Functioning:  Intelligent, open, communication  Family Support:  Good   Past Trauma:  Father  when 16 y/o  Emotional Functioning:  Emotionally vulnerable     Next session: []  Not interested in Psychotherapy, only medications    []  Will call after knowing schedule []  Will call after IOP/PHP [x]  Next week      Provider:  Tejas Kelly M.S., LPC, CSAC, ICS             stretcher

## 2025-07-24 NOTE — CHART NOTE - NSCHARTNOTEFT_GEN_A_CORE
PACU ANESTHESIA ADMISSION NOTE      Procedure: EGD/colonoscopy  Post op diagnosis:  Perez's esophagus, screening    __x__  Patent Airway    __x__  Full return of protective reflexes    __x__  Full recovery from anesthesia / back to baseline status    Vitals:  T(C): 36.5 (07-24-25 @ 08:51), Max: 36.5 (07-24-25 @ 08:39)  HR: 68 (07-24-25 @ 08:51) (68 - 68)  BP: 138/71 (07-24-25 @ 08:51) (138/71 - 138/71)  RR: 18 (07-24-25 @ 08:51) (18 - 18)  SpO2: 97% (07-24-25 @ 08:51) (97% - 97%)    Mental Status:  __x__ Awake   ___x__ Alert   _____ Drowsy   _____ Sedated    Nausea/Vomiting:  __x__ NO  ______Yes,   See Post - Op Orders          Pain Scale (0-10):  __0___    Treatment: ____ None    __x__ See Post - Op/PCA Orders    Post - Operative Fluids:   ____ Oral   __x__ See Post - Op Orders    Plan: Discharge:   __x__Home       _____Floor     _____Critical Care    _____  Other:_________________    Comments: Patient had smooth intraoperative event, no anesthesia complication.  PACU Vital signs: HR: 69            BP:        106/55          RR: 16            O2 Sat:       98%

## 2025-07-24 NOTE — ASU PATIENT PROFILE, ADULT - NSICDXPASTSURGICALHX_GEN_ALL_CORE_FT
PAST SURGICAL HISTORY:  History of colonoscopy     History of surgery RIGHT HIP REPLACEMENT    History of surgery BACK SURGERY NO HARDWARE

## 2025-07-25 LAB — SURGICAL PATHOLOGY STUDY: SIGNIFICANT CHANGE UP

## 2025-07-29 DIAGNOSIS — K64.8 OTHER HEMORRHOIDS: ICD-10-CM

## 2025-07-29 DIAGNOSIS — F32.9 MAJOR DEPRESSIVE DISORDER, SINGLE EPISODE, UNSPECIFIED: ICD-10-CM

## 2025-07-29 DIAGNOSIS — E03.9 HYPOTHYROIDISM, UNSPECIFIED: ICD-10-CM

## 2025-07-29 DIAGNOSIS — K44.9 DIAPHRAGMATIC HERNIA WITHOUT OBSTRUCTION OR GANGRENE: ICD-10-CM

## 2025-07-29 DIAGNOSIS — I10 ESSENTIAL (PRIMARY) HYPERTENSION: ICD-10-CM

## 2025-07-29 DIAGNOSIS — K29.80 DUODENITIS WITHOUT BLEEDING: ICD-10-CM

## 2025-07-29 DIAGNOSIS — K29.50 UNSPECIFIED CHRONIC GASTRITIS WITHOUT BLEEDING: ICD-10-CM

## 2025-07-29 DIAGNOSIS — E61.1 IRON DEFICIENCY: ICD-10-CM

## 2025-07-29 DIAGNOSIS — K21.00 GASTRO-ESOPHAGEAL REFLUX DISEASE WITH ESOPHAGITIS, WITHOUT BLEEDING: ICD-10-CM

## 2025-07-29 DIAGNOSIS — K64.4 RESIDUAL HEMORRHOIDAL SKIN TAGS: ICD-10-CM

## 2025-08-04 ENCOUNTER — APPOINTMENT (OUTPATIENT)
Dept: GASTROENTEROLOGY | Facility: CLINIC | Age: 65
End: 2025-08-04
Payer: COMMERCIAL

## 2025-08-04 VITALS
HEIGHT: 64 IN | HEART RATE: 80 BPM | BODY MASS INDEX: 20.49 KG/M2 | SYSTOLIC BLOOD PRESSURE: 139 MMHG | DIASTOLIC BLOOD PRESSURE: 80 MMHG | WEIGHT: 120 LBS

## 2025-08-04 DIAGNOSIS — R12 HEARTBURN: ICD-10-CM

## 2025-08-04 DIAGNOSIS — D50.9 IRON DEFICIENCY ANEMIA, UNSPECIFIED: ICD-10-CM

## 2025-08-04 PROCEDURE — 99214 OFFICE O/P EST MOD 30 MIN: CPT

## 2025-08-04 RX ORDER — FAMOTIDINE 20 MG/1
20 TABLET, FILM COATED ORAL
Qty: 30 | Refills: 1 | Status: ACTIVE | COMMUNITY
Start: 2025-08-04 | End: 1900-01-01